# Patient Record
Sex: MALE | Race: WHITE | NOT HISPANIC OR LATINO | Employment: OTHER | URBAN - METROPOLITAN AREA
[De-identification: names, ages, dates, MRNs, and addresses within clinical notes are randomized per-mention and may not be internally consistent; named-entity substitution may affect disease eponyms.]

---

## 2019-06-12 ENCOUNTER — OFFICE VISIT (OUTPATIENT)
Dept: PULMONOLOGY | Facility: HOSPITAL | Age: 69
End: 2019-06-12
Payer: MEDICARE

## 2019-06-12 VITALS
WEIGHT: 205 LBS | HEART RATE: 97 BPM | HEIGHT: 77 IN | OXYGEN SATURATION: 98 % | DIASTOLIC BLOOD PRESSURE: 90 MMHG | SYSTOLIC BLOOD PRESSURE: 116 MMHG | TEMPERATURE: 96.2 F | BODY MASS INDEX: 24.21 KG/M2

## 2019-06-12 DIAGNOSIS — R06.00 DYSPNEA ON EXERTION: Primary | ICD-10-CM

## 2019-06-12 DIAGNOSIS — R93.89 ABNORMAL CT OF THE CHEST: ICD-10-CM

## 2019-06-12 PROBLEM — R06.09 DYSPNEA ON EXERTION: Status: ACTIVE | Noted: 2019-06-12

## 2019-06-12 PROCEDURE — 99205 OFFICE O/P NEW HI 60 MIN: CPT | Performed by: INTERNAL MEDICINE

## 2019-06-12 RX ORDER — ASPIRIN 81 MG/1
81 TABLET ORAL DAILY
COMMUNITY

## 2019-06-12 RX ORDER — PANTOPRAZOLE SODIUM 40 MG/1
40 TABLET, DELAYED RELEASE ORAL DAILY
COMMUNITY
End: 2020-07-14 | Stop reason: SDUPTHER

## 2019-06-12 RX ORDER — DOXEPIN HYDROCHLORIDE 25 MG/1
CAPSULE ORAL
COMMUNITY
End: 2020-05-06 | Stop reason: ALTCHOICE

## 2019-06-12 RX ORDER — PRAVASTATIN SODIUM 20 MG
20 TABLET ORAL DAILY
COMMUNITY
End: 2020-06-15 | Stop reason: SDUPTHER

## 2019-06-12 RX ORDER — SILDENAFIL 100 MG/1
100 TABLET, FILM COATED ORAL DAILY PRN
COMMUNITY
End: 2020-05-06 | Stop reason: ALTCHOICE

## 2019-06-12 RX ORDER — ZOLPIDEM TARTRATE 12.5 MG/1
12.5 TABLET, FILM COATED, EXTENDED RELEASE ORAL
COMMUNITY
End: 2020-11-25 | Stop reason: ALTCHOICE

## 2019-06-18 ENCOUNTER — TELEPHONE (OUTPATIENT)
Dept: PULMONOLOGY | Facility: CLINIC | Age: 69
End: 2019-06-18

## 2019-06-18 NOTE — TELEPHONE ENCOUNTER
Attempted to call patient's wife to discuss questions  No answer  Message left to return call at convenience

## 2019-06-18 NOTE — TELEPHONE ENCOUNTER
Patient's wife Cornel Jacome called requesting a call back regarding CT scan results as well has question and concern regarding a Bronch he is scheduled for on 06/28/19   Vadim Morgan can be reached at [phone]   Thank you

## 2019-06-28 ENCOUNTER — HOSPITAL ENCOUNTER (OUTPATIENT)
Dept: GASTROENTEROLOGY | Facility: HOSPITAL | Age: 69
Setting detail: OUTPATIENT SURGERY
Discharge: HOME | End: 2019-06-28
Attending: INTERNAL MEDICINE

## 2019-07-16 ENCOUNTER — EVALUATION (OUTPATIENT)
Dept: PHYSICAL THERAPY | Facility: CLINIC | Age: 69
End: 2019-07-16
Payer: MEDICARE

## 2019-07-16 ENCOUNTER — TRANSCRIBE ORDERS (OUTPATIENT)
Dept: PHYSICAL THERAPY | Facility: CLINIC | Age: 69
End: 2019-07-16

## 2019-07-16 DIAGNOSIS — R26.9 GAIT ABNORMALITY: Primary | ICD-10-CM

## 2019-07-16 PROCEDURE — 97162 PT EVAL MOD COMPLEX 30 MIN: CPT | Performed by: PHYSICAL THERAPIST

## 2019-07-16 NOTE — LETTER
2019    Corinne Mix, MD [place]  [facility]  [id number] [place] [address]    Patient: Parminder Cummings   YOB: 1950   Date of Visit: 2019     Encounter Diagnosis     ICD-10-CM    1  Gait abnormality R26 9        Dear Dr Bray Keys:    Please review the attached Plan of Care from [facility] recent visit  Please verify that you agree therapy should continue by signing the attached document and sending it back to our office  If you have any questions or concerns, please don't hesitate to call  Sincerely,    Charmayne Sins, PT      Referring Provider:      I certify that I have read the below Plan of Care and certify the need for these services furnished under this plan of treatment while under my care  Corinne Mix, MD [place]  [address]: [phone]          PT Evaluation     Today's date: 2019  Patient name: Parminder Cummings  : 1950  MRN: [id number]  Referring provider: Polo Walker MD  Dx:   Encounter Diagnosis     ICD-10-CM    1  Gait abnormality R26 9                   Assessment  Assessment details: Patient is a 77 y o  male presenting to outpatient physical therapy with chief complaints of difficulty walking  No further referral appears necessary at this time based upon examination results  Patient describes history of several falls in the past year and difficulty balancing with significant fall about a month ago resulting in cervical spine injury which was stabilized with surgery  Patient displays with abnormal gait, abnormal posture, balance impairments, functional strength deficits and primary movement diagnosis of balance restriction resulting in pathanatomical signs and symptoms consistent with gait abnoramlity and functional restrictions  Skilled PT is required to improve strength and balance to allow patient to return to desired level of function with ambulation          Please contact me if you have any questions  Thank you for the opportunity to share in the care of this patient  Impairments: abnormal coordination, abnormal gait, abnormal movement, impaired balance, impaired physical strength and lacks appropriate home exercise program    Symptom irritability: moderateUnderstanding of Dx/Px/POC: good   Prognosis: good    Goals  ST  Increase (B) UE/LE strength by 1/2 MMT grade in 3 weeks  2  Improve Romberg balance EC to 5 seconds in 3 weeks  3  Patient to ambulate with [facility] community distances in 3 weeks  4  I with HEP in 3 weeks  5  Improve FOTO score to 47 in 3 weeks  LT  Increase (B) UE/LE strength to 4-4+/5 all planes in 6 weeks  4  Improve Romberg balance EC to 15 seconds in 6 weeks  5  Patient to ambulate without  community distances in 6 weeks  6  I with HEP in 6 weeks  7  Improve FOTO score to 53 in 6 weeks  Plan  Plan details: Prognosis above is given PT services 2x/week over the next 6 weeks and home program adherence  Patient would benefit from: skilled physical therapy  Planned modality interventions: thermotherapy: hydrocollator packs and cryotherapy  Planned therapy interventions: activity modification, joint mobilization, manual therapy, motor coordination training, neuromuscular re-education, patient education, self care, therapeutic activities, therapeutic exercise, graded activity, home exercise program, behavior modification, gait training, transfer training and strengthening  Plan of Care beginning date: 2019  Plan of Care expiration date: 2019  Treatment plan discussed with: patient        Subjective Evaluation    History of Present Illness  Date of surgery: 2019  Mechanism of injury: surgery  Mechanism of injury: He reports over the past few years he has experienced difficulty balancing and having several falls in the last year - attributed to alcohol abuse  He notes alcohol abuse is to help him sleep    He has an appointment scheduled with a pulmonologist and sleep expert  He reports falling backwards about a month ago hitting his head  He was taken to the hospital - imaging was performed  Patient describes no fracture however instability was found  He reports undergoing surgery to stabilize upper cervical spine  He reports being transitioned to a skilled nursing facility - physician wrote script for outpatient PT  At his last visit with his surgeon he was given two additional weeks to wear his collar  Pain  Current pain ratin  At best pain ratin  At worst pain ratin    Social Support  Lives in: multiple-level home  Lives with: spouse    Patient Goals  Patient goal: Improve balance for walking around the house        Objective     Static Posture     Knee   Genu varus       Comments  Wide BRANNON    Neurological Testing     Sensation   Cervical/Thoracic   Left   Intact: light touch    Right   Intact: light touch    Reflexes   Left   Biceps (C5/C6): normal (2+)  Brachioradialis (C6): normal (2+)  Triceps (C7): normal (2+)  Akins's reflex: negative    Right   Biceps (C5/C6): normal (2+)  Brachioradialis (C6): normal (2+)  Triceps (C7): normal (2+)  Akins's reflex: negative    Strength/Myotome Testing   Cervical Spine     Left   Interossei strength (t1): 4+    Right   Interossei strength (t1): 4+    Left Shoulder     Planes of Motion   Flexion: 4+   Abduction: 4+   External rotation at 0°:  4+   Internal rotation at 0°:  4+     Right Shoulder     Planes of Motion   Flexion: 4+   Abduction: 4+   External rotation at 0°:  4+   Internal rotation at 0°:  4+     Left Elbow   Flexion: 5  Extension: 4-    Right Elbow   Flexion: 5  Extension: 4+    Left Wrist/Hand   Wrist extension: 4+  Wrist flexion: 4+  Thumb extension: 4+    Right Wrist/Hand   Wrist extension: 4+  Wrist flexion: 4+  Thumb extension: 4+    Left Hip   Planes of Motion   Flexion: 4+    Right Hip   Planes of Motion   Flexion: 4+    Left Knee   Flexion: 4+  Extension: 4+    Right Knee   Flexion: 4+  Extension: 4+    Functional Assessment        Comments  5x sit to stand: 22 sec  TU sec        Daily Treatment Diary     DX: Gait Abnormality  EPOC: 19  Precautions: fall risk, Out of cervical collar 19  CO-MORBIDITES: Cervical Fusion  PERSONAL FACTORS:    Manual                                                                 Exercise Diary  HEP         Recumbent Bike                    Gait Training with SPC                    HR/TR                    Romberg Balance with UE reaching          Sharpened Romberg          Biodex           Mini Squats          FWD Lunge          LAT Lunge                    Std Hip Flex          Std Hip ABD          Std Hip Ext                                        Floor to Standing Transfer Training                        Modalities

## 2019-07-16 NOTE — LETTER
2019    MD Jazzy Moreau  [address] [place] [address]    Patient: Erick Garduno   YOB: 1950   Date of Visit: 2019     Encounter Diagnosis     ICD-10-CM    1  Gait abnormality R26 9        Dear Dr Meliza Montana:    Please review the attached Plan of Care from [facility] recent visit  Please verify that you agree therapy should continue by signing the attached document and sending it back to our office  If you have any questions or concerns, please don't hesitate to call  Sincerely,    Anali Fenton, PT      Referring Provider:      I certify that I have read the below Plan of Care and certify the need for these services furnished under this plan of treatment while under my care  MD Jazzy Moreau  [address] [place] [address] St: [phone]          PT Evaluation     Today's date: 2019  Patient name: Erick Garduno  : 1950  MRN: [id number]  Referring provider: Piero Mukherjee MD  Dx: No diagnosis found  Assessment  Assessment details: Patient is a 77 y o   presenting to outpatient physical therapy with chief complaints of ____  ***REFERRAL STATEMENT*** Patient describes ____  Patient displays with ____ and primary movement diagnosis of ___ resulting in pathanatomical signs and symptoms consistent with ____ and functional restrictions  Skilled PT is required to ____ to allow patient to return to desired level of function with _____  Please contact me if you have any questions  Thank you for the opportunity to share in the care of this patient  Impairments: abnormal coordination, abnormal gait, abnormal movement, impaired balance, impaired physical strength and lacks appropriate home exercise program    Symptom irritability: moderateUnderstanding of Dx/Px/POC: good   Prognosis: good    Goals  ST  Increase (B) UE/LE strength by 1/2 MMT grade in 3 weeks    2  Improve Romberg balance EC to 5 seconds in 3 weeks  3  Patient to ambulate with [facility] community distances in 3 weeks  4  I with HEP in 3 weeks  5  Improve FOTO score to 47 in 3 weeks  LT  Increase (B) UE/LE strength to 4-4+/5 all planes in 6 weeks  4  Improve Romberg balance EC to 15 seconds in 6 weeks  5  Patient to ambulate without  community distances in 6 weeks  6  I with HEP in 6 weeks  7  Improve FOTO score to 53 in 6 weeks  Plan  Plan details: Prognosis above is given PT services 2x/week over the next 6 weeks and home program adherence  Patient would benefit from: skilled physical therapy  Planned modality interventions: thermotherapy: hydrocollator packs and cryotherapy  Planned therapy interventions: activity modification, joint mobilization, manual therapy, motor coordination training, neuromuscular re-education, patient education, self care, therapeutic activities, therapeutic exercise, graded activity, home exercise program, behavior modification, gait training, transfer training and strengthening  Plan of Care beginning date: 2019  Plan of Care expiration date: 2019  Treatment plan discussed with: patient        Subjective Evaluation    History of Present Illness  Date of surgery: 2019  Mechanism of injury: surgery  Mechanism of injury: He reports over the past few years he has experienced difficulty balancing  Patient history of several falls in the last year - attributed to alcohol abuse  He notes alcohol abuse is to help him sleep  He has an appointment scheduled with a pulmonologist and sleep expert  He reports falling backwards about a month ago hitting his head  He was taken to the hospital - imaging was performed  Patient describes no fracture however instability was found  He reports undergoing surgery to stabilize upper cervical spine  He reports being transitioned to a skilled nursing facility - physician wrote script for outpatient PT        At his last visit with his surgeon he was given two additional weeks to wear his collar          Pain  Current pain ratin  At best pain ratin  At worst pain ratin    Social Support  Lives in: multiple-level home  Lives with: spouse    Patient Goals  Patient goal: Improve balance for walking around the house        Objective     Neurological Testing     Sensation   Cervical/Thoracic   Left   Intact: light touch    Right   Intact: light touch    Reflexes   Left   Biceps (C5/C6): normal (2+)  Brachioradialis (C6): normal (2+)  Triceps (C7): normal (2+)  Akins's reflex: negative    Right   Biceps (C5/C6): normal (2+)  Brachioradialis (C6): normal (2+)  Triceps (C7): normal (2+)  Akins's reflex: negative    Strength/Myotome Testing   Cervical Spine     Left   Interossei strength (t1): 4+    Right   Interossei strength (t1): 4+    Left Shoulder     Planes of Motion   Flexion: 4+   Abduction: 4+   External rotation at 0°:  4+   Internal rotation at 0°:  4+     Right Shoulder     Planes of Motion   Flexion: 4+   Abduction: 4+   External rotation at 0°:  4+   Internal rotation at 0°:  4+     Left Elbow   Flexion: 5  Extension: 4-    Right Elbow   Flexion: 5  Extension: 4+    Left Wrist/Hand   Wrist extension: 4+  Wrist flexion: 4+  Thumb extension: 4+    Right Wrist/Hand   Wrist extension: 4+  Wrist flexion: 4+  Thumb extension: 4+    Left Hip   Planes of Motion   Flexion: 4+    Right Hip   Planes of Motion   Flexion: 4+    Left Knee   Flexion: 4+  Extension: 4+    Right Knee   Flexion: 4+  Extension: 4+    Functional Assessment        Comments  5x sit to stand: 22 sec  TU sec        Daily Treatment Diary     DX:  EPOC:  Precautions: ***  CO-MORBIDITES:  PERSONAL FACTORS:    Manual                                                                 Exercise Diary  HEP Modalities

## 2019-07-16 NOTE — PROGRESS NOTES
PT Evaluation     Today's date: 2019  Patient name: Angel Roa  : 1950  MRN: [id number]  Referring provider: Sirisha Stapleton MD  Dx:   Encounter Diagnosis     ICD-10-CM    1  Gait abnormality R26 9                   Assessment  Assessment details: Patient is a 77 y o  male presenting to outpatient physical therapy with chief complaints of difficulty walking  No further referral appears necessary at this time based upon examination results  Patient describes history of several falls in the past year and difficulty balancing with significant fall about a month ago resulting in cervical spine injury which was stabilized with surgery  Patient displays with abnormal gait, abnormal posture, balance impairments, functional strength deficits and primary movement diagnosis of balance restriction resulting in pathanatomical signs and symptoms consistent with gait abnoramlity and functional restrictions  Skilled PT is required to improve strength and balance to allow patient to return to desired level of function with ambulation  Please contact me if you have any questions  Thank you for the opportunity to share in the care of this patient  Impairments: abnormal coordination, abnormal gait, abnormal movement, impaired balance, impaired physical strength and lacks appropriate home exercise program    Symptom irritability: moderateUnderstanding of Dx/Px/POC: good   Prognosis: good    Goals  ST  Increase (B) UE/LE strength by 1/2 MMT grade in 3 weeks  2  Improve Romberg balance EC to 5 seconds in 3 weeks  3  Patient to ambulate with [facility] community distances in 3 weeks  4  I with HEP in 3 weeks  5  Improve FOTO score to 47 in 3 weeks  LT  Increase (B) UE/LE strength to 4-4+/5 all planes in 6 weeks  4  Improve Romberg balance EC to 15 seconds in 6 weeks  5  Patient to ambulate without  community distances in 6 weeks  6  I with HEP in 6 weeks    7  Improve FOTO score to 53 in 6 weeks       Plan  Plan details: Prognosis above is given PT services 2x/week over the next 6 weeks and home program adherence  Patient would benefit from: skilled physical therapy  Planned modality interventions: thermotherapy: hydrocollator packs and cryotherapy  Planned therapy interventions: activity modification, joint mobilization, manual therapy, motor coordination training, neuromuscular re-education, patient education, self care, therapeutic activities, therapeutic exercise, graded activity, home exercise program, behavior modification, gait training, transfer training and strengthening  Plan of Care beginning date: 2019  Plan of Care expiration date: 2019  Treatment plan discussed with: patient        Subjective Evaluation    History of Present Illness  Date of surgery: 2019  Mechanism of injury: surgery  Mechanism of injury: He reports over the past few years he has experienced difficulty balancing and having several falls in the last year - attributed to alcohol abuse  He notes alcohol abuse is to help him sleep  He has an appointment scheduled with a pulmonologist and sleep expert  He reports falling backwards about a month ago hitting his head  He was taken to the hospital - imaging was performed  Patient describes no fracture however instability was found  He reports undergoing surgery to stabilize upper cervical spine  He reports being transitioned to a skilled nursing facility - physician wrote script for outpatient PT  At his last visit with his surgeon he was given two additional weeks to wear his collar  Pain  Current pain ratin  At best pain ratin  At worst pain ratin    Social Support  Lives in: multiple-level home  Lives with: spouse    Patient Goals  Patient goal: Improve balance for walking around the house        Objective     Static Posture     Knee   Genu varus       Comments  Wide BRANNON    Neurological Testing     Sensation   Cervical/Thoracic Left   Intact: light touch    Right   Intact: light touch    Reflexes   Left   Biceps (C5/C6): normal (2+)  Brachioradialis (C6): normal (2+)  Triceps (C7): normal (2+)  Akins's reflex: negative    Right   Biceps (C5/C6): normal (2+)  Brachioradialis (C6): normal (2+)  Triceps (C7): normal (2+)  Akins's reflex: negative    Strength/Myotome Testing   Cervical Spine     Left   Interossei strength (t1): 4+    Right   Interossei strength (t1): 4+    Left Shoulder     Planes of Motion   Flexion: 4+   Abduction: 4+   External rotation at 0°: 4+   Internal rotation at 0°: 4+     Right Shoulder     Planes of Motion   Flexion: 4+   Abduction: 4+   External rotation at 0°: 4+   Internal rotation at 0°: 4+     Left Elbow   Flexion: 5  Extension: 4-    Right Elbow   Flexion: 5  Extension: 4+    Left Wrist/Hand   Wrist extension: 4+  Wrist flexion: 4+  Thumb extension: 4+    Right Wrist/Hand   Wrist extension: 4+  Wrist flexion: 4+  Thumb extension: 4+    Left Hip   Planes of Motion   Flexion: 4+    Right Hip   Planes of Motion   Flexion: 4+    Left Knee   Flexion: 4+  Extension: 4+    Right Knee   Flexion: 4+  Extension: 4+    Functional Assessment        Comments  5x sit to stand: 22 sec  TU sec        Daily Treatment Diary     DX: Gait Abnormality  EPOC: 19  Precautions: fall risk, Out of cervical collar 19  CO-MORBIDITES: Cervical Fusion  PERSONAL FACTORS:    Manual                                                                 Exercise Diary  HEP         Recumbent Bike                    Gait Training with SPC                    HR/TR                    Romberg Balance with UE reaching          Sharpened Romberg          Biodex           Mini Squats          FWD Lunge          LAT Lunge                    Std Hip Flex          Std Hip ABD          Std Hip Ext                                        Floor to Standing Transfer Training                        Modalities

## 2019-07-24 ENCOUNTER — OFFICE VISIT (OUTPATIENT)
Dept: PHYSICAL THERAPY | Facility: CLINIC | Age: 69
End: 2019-07-24
Payer: MEDICARE

## 2019-07-24 DIAGNOSIS — R26.9 GAIT ABNORMALITY: Primary | ICD-10-CM

## 2019-07-24 PROCEDURE — 97112 NEUROMUSCULAR REEDUCATION: CPT

## 2019-07-24 PROCEDURE — 97110 THERAPEUTIC EXERCISES: CPT

## 2019-07-24 NOTE — PROGRESS NOTES
Daily Note     Today's date: 2019  Patient name: Young Marshall  : 1950  MRN: [id number]  Referring provider: Catrachita Hameed MD  Dx:   Encounter Diagnosis     ICD-10-CM    1  Gait abnormality R26 9                   Subjective: Pt  Stated he has been doing well overall  He reports he has not been using his cane very much but furniture walks or goes without an assistive device if short distance  Objective: See treatment diary below      Assessment: Initiated PT POC today  Pt  Completed documented program  Tolerated treatment well  Pt  Was challenged by balance activities, especially with sharpened Romberg static standing positive sway but able to self correct  Pt  Fatigues quickly with TE's  Unable to remove UE's while working on the Biodex for weight shifting at this time  Gait trained with and without SPC, somewhat unsteady at times due to toe catch or shoe scuff on carpet  Aslo worked on pt  Sit to stand transfer from high-low table without the use of UE's today at 22" from floor to bottom of table x6 reps  Patient demonstrated fatigue post treatment, exhibited good technique with therapeutic exercises and would benefit from continued PT      Plan: Continue per plan of care  Progress treatment as tolerated           Daily Treatment Diary     DX: Gait Abnormality  EPOC: 19  Precautions: fall risk, Out of cervical collar 19  CO-MORBIDITES: Cervical Fusion  PERSONAL FACTORS:    Manual                                                                 Exercise Diary  HEP         Recumbent Bike  5'                  Gait Training with SPC  1 lap SPC  1 lap w/o                  HR/TR  10                  Romberg Balance with UE reaching  No reach  30"x2        Sharpened Romberg  20"x2 ea        Biodex   5' weight shift        Mini Squats  10        FWD Lunge          LAT Lunge  10                  Std Hip Flex  10        Std Hip ABD  10        Std Hip Ext  10                  Sit to stand on high low table  22"  x6                  Floor to Standing Transfer Training                        Modalities

## 2019-07-26 ENCOUNTER — APPOINTMENT (OUTPATIENT)
Dept: PHYSICAL THERAPY | Facility: CLINIC | Age: 69
End: 2019-07-26
Payer: MEDICARE

## 2019-07-30 ENCOUNTER — OFFICE VISIT (OUTPATIENT)
Dept: PHYSICAL THERAPY | Facility: CLINIC | Age: 69
End: 2019-07-30
Payer: MEDICARE

## 2019-07-30 DIAGNOSIS — R26.9 GAIT ABNORMALITY: Primary | ICD-10-CM

## 2019-07-30 PROCEDURE — 97110 THERAPEUTIC EXERCISES: CPT

## 2019-07-30 PROCEDURE — 97112 NEUROMUSCULAR REEDUCATION: CPT

## 2019-07-30 NOTE — PROGRESS NOTES
Daily Note     Today's date: 2019  Patient name: Chase Medina  : 1950  MRN: [id number]  Referring provider: Jose G Delgado MD  Dx:   Encounter Diagnosis     ICD-10-CM    1  Gait abnormality R26 9                   Subjective: Pt  Stated he has been doing well overall  He reports he has not been using his cane very much but furniture walks or goes without an assistive device if short distance  Objective: See treatment diary below      Assessment: Pt  Completed documented program  Tolerated treatment well  Pt  Continues to be challenged by balance activities, especially with sharpened Romberg static standing and neutral stance with EC- positive sway but able to self correct  Pt  Fatigues quickly with TE's  Needs continued work with eight shifting on Biodex  While ambulating pt  Tends to scuff floor at times which throws off his gait at times  Continued to worked on pt  Sit to stand transfer from high-low table without the use of UE's today at 21" from floor to bottom of table x10 reps  Patient demonstrated fatigue post treatment, exhibited good technique with therapeutic exercises and would benefit from continued PT      Plan: Continue per plan of care  Progress treatment as tolerated           Daily Treatment Diary     DX: Gait Abnormality  EPOC: 19  Precautions: fall risk, Out of cervical collar 19  CO-MORBIDITES: Cervical Fusion  PERSONAL FACTORS:    Manual                                                                 Exercise Diary  HEP        Recumbent Bike  5' 6'                 Gait Training with SPC  1 lap SPC  1 lap w/o 2 laps  No AD                 HR/TR  10 20                 Romberg Balance with UE reaching  No reach  30"x2 No reach  30"x2       Neutral BRANNON w  EC   15"x3       Sharpened Romberg  20"x2 ea 15"x3 ea       Biodex   5' weight shift 5'  Weight shift       Mini Squats  10 10       FWD Lunge          LAT Lunge  10 10                 Std Hip Flex  10 15 Std Hip ABD  10 15       Std Hip Ext  10 15                 Sit to stand on high low table  22"  x6 21"x  10       SLR   10 ea       TB H/L hip ABD   BTB  5"x15       Bridges   5"x10                 Floor to Standing Transfer Training                        Modalities                                                  Daily Note     Today's date: 2019  Patient name: Gabino Sheets  : 1950  MRN: [id number]  Referring provider: Balta Sanchez MD  Dx:   Encounter Diagnosis     ICD-10-CM    1  Gait abnormality R26 9                   Subjective: Pt  Stated he has been doing well overall  He reports he has not been using his cane very much but furniture walks or goes without an assistive device if short distance  Objective: See treatment diary below      Assessment: Initiated PT POC today  Pt  Completed documented program  Tolerated treatment well  Pt  Was challenged by balance activities, especially with sharpened Romberg static standing positive sway but able to self correct  Pt  Fatigues quickly with TE's  Unable to remove UE's while working on the [email or web address] for weight shifting at this time  Gait trained with and without SPC, somewhat unsteady at times due to toe catch or shoe scuff on carpet  Aslo worked on pt  Sit to stand transfer from high-low table without the use of UE's today at 22" from floor to bottom of table x6 reps  Patient demonstrated fatigue post treatment, exhibited good technique with therapeutic exercises and would benefit from continued PT      Plan: Continue per plan of care  Progress treatment as tolerated           Daily Treatment Diary     DX: Gait Abnormality  EPOC: 19  Precautions: fall risk, Out of cervical collar 19  CO-MORBIDITES: Cervical Fusion  PERSONAL FACTORS:    Manual                                                                 Exercise Diary  HEP        Recumbent Bike  5' 6'                 Gait Training with SPC  1 lap SPC  1 lap w/o HR/TR  10 15                 Romberg Balance with UE reaching  No reach  30"x2        Sharpened Romberg  20"x2 ea        Biodex   5' weight shift        Mini Squats  10 10       FWD Lunge          LAT Lunge  10 10                 Std Hip Flex  10 15       Std Hip ABD  10 15       Std Hip Ext  10 15                 Sit to stand on high low table  22"  x6                  Floor to Standing Transfer Training                        Modalities

## 2019-08-02 ENCOUNTER — OFFICE VISIT (OUTPATIENT)
Dept: PHYSICAL THERAPY | Facility: CLINIC | Age: 69
End: 2019-08-02
Payer: MEDICARE

## 2019-08-02 DIAGNOSIS — R26.9 GAIT ABNORMALITY: Primary | ICD-10-CM

## 2019-08-02 PROCEDURE — 97110 THERAPEUTIC EXERCISES: CPT

## 2019-08-02 PROCEDURE — 97112 NEUROMUSCULAR REEDUCATION: CPT

## 2019-08-02 NOTE — PROGRESS NOTES
Daily Note     Today's date: 2019  Patient name: Lindsay Bain  : 1950  MRN: [id number]  Referring provider: Molly Herbert MD  Dx:   Encounter Diagnosis     ICD-10-CM    1  Gait abnormality R26 9                   Subjective: Pt  Stated he has been doing well overall  He reports he has not been using his cane very much but furniture walks or goes without an assistive device if short distance  Objective: See treatment diary below      Assessment: Pt  Completed documented program  Tolerated treatment well  Pt  Continues to be challenged by balance activities, especially with sharpened Romberg static standing and neutral stance with EC- positive sway but able to self correct  Has improved some since starting therapy  Pt  Fatigues quickly with TE's  Needs continued work with weight shifting on Biode[employer]  While ambulating pt  Tends to scuff floor at times which throws off his gait at times  Continued to worked on pt  Sit to stand transfer from high-low table without the use of UE's today at 20" from floor to bottom of table x10 reps  Patient demonstrated fatigue post treatment, exhibited good technique with therapeutic exercises and would benefit from continued PT      Plan: Continue per plan of care  Progress treatment as tolerated           Daily Treatment Diary     DX: Gait Abnormality  EPOC: 19  Precautions: fall risk, Out of cervical collar 19  CO-MORBIDITES: Cervical Fusion  PERSONAL FACTORS:    Manual                                                                 Exercise Diary  HEP  8/2      Recumbent Bike  5' 6' 10'                Gait Training with SPC  1 lap SPC  1 lap w/o 2 laps  No AD 2 laps  No AD                HR/TR  10 20 20                Romberg Balance with UE reaching  No reach  30"x2 No reach  30"x2 No reach  30"x2      Neutral BRANNON w  EC   15"x3 20"x3      Sharpened Romberg  20"x2 ea 15"x3 ea 20"x3      Biodex   5' weight shift 5'  Weight shift 5'  Weight shift      Mini Squats  10 10 10      FWD Lunge          LAT Lunge  10 10 10                Std Hip Flex  10 15 15      Std Hip ABD  10 15 15      Std Hip Ext  10 15 15                Sit to stand on high low table  22"  x6 21"x  10 20" x  10      SLR   10 ea 10 ea      TB H/L hip ABD   BTB  5"x15 BTB  5"x15      Bridges   5"x10 5"x10                Floor to Standing Transfer Training                        Modalities

## 2019-08-06 ENCOUNTER — OFFICE VISIT (OUTPATIENT)
Dept: PHYSICAL THERAPY | Facility: CLINIC | Age: 69
End: 2019-08-06
Payer: MEDICARE

## 2019-08-06 DIAGNOSIS — R26.9 GAIT ABNORMALITY: Primary | ICD-10-CM

## 2019-08-06 PROCEDURE — 97112 NEUROMUSCULAR REEDUCATION: CPT

## 2019-08-06 PROCEDURE — 97110 THERAPEUTIC EXERCISES: CPT

## 2019-08-06 NOTE — PROGRESS NOTES
Daily Note     Today's date: 2019  Patient name: Jos eMartin Maloney  : 1950  MRN: [id number]  Referring provider: Bel Watson MD  Dx:   Encounter Diagnosis     ICD-10-CM    1  Gait abnormality R26 9                   Subjective: Pt  Stated he has been doing well overall  He stated he feels a little nauseous today and may need to avoid some of the balance activities  Objective: See treatment diary below      Assessment: Pt  Completed documented program  Tolerated treatment well  Pt  Continues to be challenged by balance activities, especially with sharpened Romberg static standing and neutral stance with EC- positive sway but able to self correct  Has improved some since starting therapy  Pt  Fatigues quickly with TE's  Needs continued work with weight shifting on Biodex  While ambulating pt  Tends to scuff floor at times which throws off his gait at times  Continued to worked on pt  Sit to stand transfer from high-low table without the use of UE's today at 19" from floor to bottom of table x10 reps  Patient demonstrated fatigue post treatment, exhibited good technique with therapeutic exercises and would benefit from continued PT  * Did not progress balance activities due to pt 's nausea but is ready to progress NV  Plan: Continue per plan of care  Progress treatment as tolerated           Daily Treatment Diary     DX: Gait Abnormality  EPOC: 19  Precautions: fall risk, Out of cervical collar 19  CO-MORBIDITES: Cervical Fusion  PERSONAL FACTORS:    Manual                                                                 Exercise Diary  HEP  8/ 8/6     Recumbent Bike  5' 6' 10' 10'               Gait Training with SPC  1 lap SPC  1 lap w/o 2 laps  No AD 2 laps  No AD                HR/TR  10 20 20 20               Romberg Balance with UE reaching  No reach  30"x2 No reach  30"x2 No reach  30"x2 No reach  30"x2     Neutral BRANNON w  EC   15"x3 20"x3 20"x3     Sharpened Romberg  20"x2 ea 15"x3 ea 20"x3 20"x3 ea     Biodex   5' weight shift 5'  Weight shift 5'  Weight shift 5'  Weight shift     Mini Squats  10 10 10 10     FWD Lunge          LAT Lunge  10 10 10 10 a               Std Hip Flex  10 15 15 2 5#  x10     Std Hip ABD  10 15 15 2 5#  x10 ea     Std Hip Ext  10 15 15 2 5#  x10               Sit to stand on high low table  22"  x6 21"x  10 20" x  10 19"x  10     SLR   10 ea 10 ea 15 ea     TB H/L hip ABD   BTB  5"x15 BTB  5"x15 BTB  10x2     Bridges   5"x10 5"x10 5"x10               Floor to Standing Transfer Training                        Modalities

## 2019-08-09 ENCOUNTER — OFFICE VISIT (OUTPATIENT)
Dept: PHYSICAL THERAPY | Facility: CLINIC | Age: 69
End: 2019-08-09
Payer: MEDICARE

## 2019-08-09 DIAGNOSIS — R26.9 GAIT ABNORMALITY: Primary | ICD-10-CM

## 2019-08-09 PROCEDURE — 97112 NEUROMUSCULAR REEDUCATION: CPT

## 2019-08-09 PROCEDURE — 97110 THERAPEUTIC EXERCISES: CPT

## 2019-08-09 NOTE — PROGRESS NOTES
Daily Note     Today's date: 2019  Patient name: Ann Arrington  : 1950  MRN: [id number]  Referring provider: Moriah Buenrostro MD  Dx:   Encounter Diagnosis     ICD-10-CM    1  Gait abnormality R26 9                   Subjective: Pt  Stated he has been doing well and feeling more stable and balanced around the house  Objective: See treatment diary below      Assessment: Pt  Completed documented program  Pt  Continues to improve with balance activities  Still has difficulty with eyes closed and on uneven surfaces  Has continued to gain strength and improved his sit to stand from lower height  Pt  Would like to work on standing from the ground without assistance from anything  He does not wish to use his cane any longer  Plan: Continue per plan of care  Progress treatment as tolerated           Daily Treatment Diary     DX: Gait Abnormality  EPOC: 19  Precautions: fall risk, Out of cervical collar 19  CO-MORBIDITES: Cervical Fusion  PERSONAL FACTORS:    Manual                                                                 Exercise Diary  HEP       Recumbent Bike  5' 6' 10' 10' 8'              Gait Training with SPC  1 lap SPC  1 lap w/o 2 laps  No AD 2 laps  No AD                HR/TR  10 20 20 20 20              Romberg Balance with UE reaching  No reach  30"x2 No reach  30"x2 No reach  30"x2 No reach  30"x2 On foam  30"x2    Neutral BRANNON w  EC   15"x3 20"x3 20"x3 20"x3    Sharpened Romberg  20"x2 ea 15"x3 ea 20"x3 20"x3 ea 20"x2 ea    Tandem amb //bars      Foam  3 laps    Biodex   5' weight shift 5'  Weight shift 5'  Weight shift 5'  Weight shift     Mini Squats  10 10 10 10 10    FWD Lunge          LAT Lunge  10 10 10 10 a 2 5#  10 ea              Std Hip Flex  10 15 15 2 5#  x10 2 5#  x20    Std Hip ABD  10 15 15 2 5#  x10 ea 2 5#  x20    Std Hip Ext  10 15 15 2 5#  x10 2 5#  x20              Sit to stand on high low table  22"  x6 21"x  10 20" x  10 19"x  10 18"x  10    SLR   10 ea 10 ea 15 ea 2# x20     TB H/L hip ABD   BTB  5"x15 BTB  5"x15 BTB  10x2 BTB  10x2    Bridges   5"x10 5"x10 5"x10 5"x15              Floor to Standing Transfer Training      nv?                   Modalities

## 2019-08-13 ENCOUNTER — OFFICE VISIT (OUTPATIENT)
Dept: PHYSICAL THERAPY | Facility: CLINIC | Age: 69
End: 2019-08-13
Payer: MEDICARE

## 2019-08-13 DIAGNOSIS — R26.9 GAIT ABNORMALITY: Primary | ICD-10-CM

## 2019-08-13 PROCEDURE — 97110 THERAPEUTIC EXERCISES: CPT | Performed by: PHYSICAL THERAPIST

## 2019-08-13 PROCEDURE — 97530 THERAPEUTIC ACTIVITIES: CPT | Performed by: PHYSICAL THERAPIST

## 2019-08-13 PROCEDURE — 97112 NEUROMUSCULAR REEDUCATION: CPT | Performed by: PHYSICAL THERAPIST

## 2019-08-13 NOTE — PROGRESS NOTES
Daily Note     Today's date: 2019  Patient name: Chen Haque  : 1950  MRN: [id number]  Referring provider: Katlin Andrade MD  Dx:   Encounter Diagnosis     ICD-10-CM    1  Gait abnormality R26 9                   Subjective: Patient reports no adverse reaction to his LV  He reports overall he sees improvement with balance and continues to be challenged with PT treatments  Objective: See treatment diary below      Assessment: Attempted floor to stand transfer - able to transition from tall kneeling to half kneeling but unable to progress to standing from half kneeling  Discussed options and importance of not falling - patient agreed to use [facility] when outside in an effort to avoid falling  Balance exercises were progressed with reaching and biodex  Patient was frustrated with lack of progress - explained importance to push the limits of balance and how his walking balance has improved  Plan: Continue per plan of care  Progress balancing squat/ FWD lunge strength as able to achieve floor to stand transfer  Monitor compliance with use of SPC when walking in the grass           Daily Treatment Diary     DX: Gait Abnormality  EPOC: 19  Precautions: fall risk, Out of cervical collar 19  CO-MORBIDITES: Cervical Fusion  PERSONAL FACTORS:    Manual                                                                 Exercise Diary  HEP  8     Recumbent Bike  10' 10' 8' 8'               Gait Training with SPC  2 laps  No AD                  HR/TR  20 20 20                Romberg Balance with UE reaching  No reach  30"x2 No reach  30"x2 On foam  30"x2 Foam  6 Way  10x ea     Neutral BRANNON w  EC  20"x3 20"x3 20"x3 20"x3      Sharpened Romberg  20"x3 20"x3 ea 20"x2 ea 20"x2 ea     Tandem amb //bars    Foam  3 laps Foam3 laps     Biodex   5'  Weight shift 5'  Weight shift  5' FWD/ LAT Wt Shift     Mini Squats  10 10 10 10     FWD Lunge     10x ea     LAT Lunge  10 10 a 2 5#  10 ea 10x ea               Std Hip Flex  15 2 5#  x10 2 5#  x20      Std Hip ABD  15 2 5#  x10 ea 2 5#  x20      Std Hip Ext  15 2 5#  x10 2 5#  x20                Sit to stand on high low table  20" x  10 19"x  10 18"x  10 18"x  10     SLR  10 ea 15 ea 2# x20       TB H/L hip ABD  BTB  5"x15 BTB  10x2 BTB  10x2      Bridges  5"x10 5"x10 5"x15                Floor to Standing Transfer Training    nv? 8 min                   Modalities

## 2019-08-16 ENCOUNTER — OFFICE VISIT (OUTPATIENT)
Dept: PHYSICAL THERAPY | Facility: CLINIC | Age: 69
End: 2019-08-16
Payer: MEDICARE

## 2019-08-16 DIAGNOSIS — R26.9 GAIT ABNORMALITY: Primary | ICD-10-CM

## 2019-08-16 PROCEDURE — 97110 THERAPEUTIC EXERCISES: CPT

## 2019-08-16 PROCEDURE — 97112 NEUROMUSCULAR REEDUCATION: CPT

## 2019-08-16 PROCEDURE — 97530 THERAPEUTIC ACTIVITIES: CPT

## 2019-08-16 NOTE — PROGRESS NOTES
Daily Note     Today's date: 2019  Patient name: Chen Haque  : 1950  MRN: [id number]  Referring provider: Katlin Andrade MD  Dx:   Encounter Diagnosis     ICD-10-CM    1  Gait abnormality R26 9                   Subjective: Patient reports no adverse reaction to his LV  He reports overall he sees improvement with balance and continues to be challenged with PT treatments  Objective: See treatment diary below      Assessment: Continued to challenge pt 's balance while standing on uneven surfaces and reaching  Pt  Continues to be challenged when reaching outside of his BRANNON  Positive sway and unable to self correct with modified tandem stance-needed therapist assistance to regain balance  He continues to build strength in LE's and will revisit standing from floor as he progresses  Plan: Continue per plan of care  Progress balancing squat/ FWD lunge strength as able to achieve floor to stand transfer  Monitor compliance with use of SPC when walking in the grass           Daily Treatment Diary     DX: Gait Abnormality  EPOC: 19  Precautions: fall risk, Out of cervical collar 19  CO-MORBIDITES: Cervical Fusion  PERSONAL FACTORS:    Manual                                                                 Exercise Diary  HEP     Recumbent Bike  10' 10' 8' 8' 10'              Gait Training with SPC  2 laps  No AD                  HR/TR  20 20 20  20              Romberg Balance with UE reaching  No reach  30"x2 No reach  30"x2 On foam  30"x2 Foam  6 Way  10x ea Foam  6 Way  10x ea    Neutral BRANNON w  EC  20"x3 20"x3 20"x3 20"x3  30"x3    Sharpened Romberg  20"x3 20"x3 ea 20"x2 ea 20"x2 ea 30"x2 ea    Tandem amb //bars    Foam  3 laps Foam3 laps Foam3 laps    Biodex   5'  Weight shift 5'  Weight shift  5' FWD/ LAT Wt Shift 8' FWD/ LAT Wt Shift    Mini Squats  10 10 10 10 10    FWD Lunge     10x ea 10x2    LAT Lunge  10 10 a 2 5#  10 ea 10x ea 10              Std Hip Flex  15 2 5#  x10 2 5#  x20      Std Hip ABD  15 2 5#  x10 ea 2 5#  x20      Std Hip Ext  15 2 5#  x10 2 5#  x20      Leg Press      55#  x12              Sit to stand on high low table  20" x  10 19"x  10 18"x  10 18"x  10 17"x  10    SLR  10 ea 15 ea 2# x20   3#  10 ea    TB H/L hip ABD  BTB  5"x15 BTB  10x2 BTB  10x2  BTB  10x2    Bridges  5"x10 5"x10 5"x15  5"x15              Floor to Standing Transfer Training    nv? 8 min                   Modalities Island Pedicle Flap Text: The defect edges were debeveled with a #15 scalpel blade.  Given the location of the defect, shape of the defect and the proximity to free margins an island pedicle advancement flap was deemed most appropriate.  Using a sterile surgical marker, an appropriate advancement flap was drawn incorporating the defect, outlining the appropriate donor tissue and placing the expected incisions within the relaxed skin tension lines where possible.    The area thus outlined was incised deep to adipose tissue with a #15 scalpel blade.  The skin margins were undermined to an appropriate distance in all directions around the primary defect and laterally outward around the island pedicle utilizing iris scissors.  There was minimal undermining beneath the pedicle flap.

## 2019-08-20 ENCOUNTER — APPOINTMENT (OUTPATIENT)
Dept: PHYSICAL THERAPY | Facility: CLINIC | Age: 69
End: 2019-08-20
Payer: MEDICARE

## 2019-08-22 ENCOUNTER — OFFICE VISIT (OUTPATIENT)
Dept: PHYSICAL THERAPY | Facility: CLINIC | Age: 69
End: 2019-08-22
Payer: MEDICARE

## 2019-08-22 DIAGNOSIS — R26.9 GAIT ABNORMALITY: Primary | ICD-10-CM

## 2019-08-22 PROCEDURE — 97110 THERAPEUTIC EXERCISES: CPT

## 2019-08-22 PROCEDURE — 97530 THERAPEUTIC ACTIVITIES: CPT

## 2019-08-22 PROCEDURE — 97112 NEUROMUSCULAR REEDUCATION: CPT

## 2019-08-22 NOTE — PROGRESS NOTES
Daily Note     Today's date: 2019  Patient name: Gabino Sheets  : 1950  MRN: [id number]  Referring provider: Balta Sanchez MD  Dx:   Encounter Diagnosis     ICD-10-CM    1  Gait abnormality R26 9                   Subjective: Patient reports no adverse reaction to his LV  He reports overall he sees improvement with balance and continues to be challenged with PT treatments  Objective: See treatment diary below      Assessment: Continued to challenge pt 's balance while standing on uneven surfaces and reaching  Pt  Continues to be challenged when reaching outside of his BRANNON  Positive sway and but able to self correct with modified tandem stance  Eyes closed also very challenging for pt  But has improved  Continues to be able to progress sit/stand xfer from lower heights  He continues to build strength in LE's and will revisit standing from floor as he progresses  Plan: Continue per plan of care  Progress balancing squat/ FWD lunge strength as able to achieve floor to stand transfer  Monitor compliance with use of SPC when walking in the grass           Daily Treatment Diary     DX: Gait Abnormality  EPOC: 19  Precautions: fall risk, Out of cervical collar 19  CO-MORBIDITES: Cervical Fusion  PERSONAL FACTORS:    Manual                                                                 Exercise Diary  HEP    Recumbent Bike  10' 10' 8' 8' 10' 8'             Gait Training with SPC  2 laps  No AD                  HR/TR  20 20 20  20 20             Romberg Balance with UE reaching  No reach  30"x2 No reach  30"x2 On foam  30"x2 Foam  6 Way  10x ea Foam  6 Way  10x ea Foam  6 Way  10x ea   Neutral BRANNON w  EC  20"x3 20"x3 20"x3 20"x3  30"x3 30"x3   Sharpened Romberg  20"x3 20"x3 ea 20"x2 ea 20"x2 ea 30"x2 ea 30"x2 ea   Tandem amb //bars    Foam  3 laps Foam3 laps Foam3 laps No foam  3 laps   Biodex   5'  Weight shift 5'  Weight shift  5' FWD/ LAT Wt Shift 8' FWD/ LAT Wt Shift 8' weight shift /  stability     Mini Squats  10 10 10 10 10 5"x10   FWD Lunge     10x ea 10x2 10 ea   LAT Lunge  10 10 a 2 5#  10 ea 10x ea 10 10 ea             Std Hip Flex  15 2 5#  x10 2 5#  x20      Std Hip ABD  15 2 5#  x10 ea 2 5#  x20      Std Hip Ext  15 2 5#  x10 2 5#  x20      Leg Press      55#  x12 55#  10x2             Sit to stand on high low table  20" x  10 19"x  10 18"x  10 18"x  10 17"x  10 17"x  10   SLR  10 ea 15 ea 2# x20   3#  10 ea 3#  10x2 ea   TB H/L hip ABD  BTB  5"x15 BTB  10x2 BTB  10x2  BTB  10x2 BTB  10x2   Bridges  5"x10 5"x10 5"x15  5"x15 5"x15  W   VT             Floor to Standing Transfer Training    nv? 8 min  Revisit  In future                   Modalities

## 2019-08-27 ENCOUNTER — EVALUATION (OUTPATIENT)
Dept: PHYSICAL THERAPY | Facility: CLINIC | Age: 69
End: 2019-08-27
Payer: MEDICARE

## 2019-08-27 ENCOUNTER — TRANSCRIBE ORDERS (OUTPATIENT)
Dept: PHYSICAL THERAPY | Facility: CLINIC | Age: 69
End: 2019-08-27

## 2019-08-27 DIAGNOSIS — R26.9 GAIT ABNORMALITY: Primary | ICD-10-CM

## 2019-08-27 PROCEDURE — 97110 THERAPEUTIC EXERCISES: CPT | Performed by: PHYSICAL THERAPIST

## 2019-08-27 NOTE — LETTER
2019    MD Jazzy Hirsch  [address] [address]    Patient: Megan Pack   YOB: 1950   Date of Visit: 2019     Encounter Diagnosis     ICD-10-CM    1  Gait abnormality R26 9        Dear Dr Swati Matamoros: Thank you for your recent referral of Megan Pack  Please review the attached evaluation summary from Obinna's recent visit  Please verify that you agree with the plan of care by signing the attached order  If you have any questions or concerns, please do not hesitate to call  I sincerely appreciate the opportunity to share in the care of one of your patients and hope to have another opportunity to work with you in the near future  Sincerely,    López Beaver, PT      Referring Provider:      I certify that I have read the below Plan of Care and certify the need for these services furnished under this plan of treatment while under my care  Jennifer Swift MD  tamika  [address] [place] [address] St: [phone]          PT Re-Evaluation     Today's date: 2019  Patient name: Megan Pack  : 1950  MRN: [id number]  Referring provider: Sandrine Camacho MD  Dx:   Encounter Diagnosis     ICD-10-CM    1  Gait abnormality R26 9                   Assessment  Assessment details: Since starting PT patient displays improvements with balance, strength, and function  Patient has made good progress with PT  Continued PT treatments focused on improving strength and balance are required to return patient to desired functional level with ambulation and transfers  Patient will be following up with a sleep expert early next month  Please contact me if you have any questions  Thank you for the opportunity to share in the care of this patient      Impairments: abnormal coordination, abnormal gait, abnormal movement, impaired balance, impaired physical strength and lacks appropriate home exercise program    Symptom irritability: moderateUnderstanding of Dx/Px/POC: good   Prognosis: good    Goals  ST  Increase (B) UE/LE strength by 1/2 MMT grade in 3 weeks  - met  2  Improve Romberg balance EC to 5 seconds in 3 weeks  - met  3  Patient to ambulate with [facility] community distances in 3 weeks  - met  4  I with HEP in 3 weeks  - met  5  Improve FOTO score to 47 in 3 weeks  - met  LT  Increase (B) UE/LE strength to 4-4+/5 all planes in 6 weeks  4  Improve Romberg balance EC to 15 seconds in 6 weeks  5  Patient to ambulate without AD community distances in 6 weeks  - met  6  I with HEP in 6 weeks  - met  7  Improve FOTO score to 53 in 6 weeks  - met      Plan  Plan details: Prognosis above is given PT services 2x/week over the next 4 weeks and home program adherence  Patient would benefit from: skilled physical therapy  Planned modality interventions: thermotherapy: hydrocollator packs and cryotherapy  Planned therapy interventions: activity modification, joint mobilization, manual therapy, motor coordination training, neuromuscular re-education, patient education, self care, therapeutic activities, therapeutic exercise, graded activity, home exercise program, behavior modification, gait training, transfer training and strengthening  Plan of Care beginning date: 2019  Plan of Care expiration date: 2019  Treatment plan discussed with: patient        Subjective Evaluation    History of Present Illness  Date of surgery: 2019  Mechanism of injury: surgery  Mechanism of injury: Patient reports since starting PT treatments he sees improvements with balance, strength, and function  He reports no neck pain since taking off collar  He notes he is no longer using an AD  He reports increased difficulty with stairs  He continues to have difficulty sleeping and difficulty with floor to stand transfer      Pain  Current pain ratin  At best pain ratin  At worst pain ratin    Social Support  Lives in: multiple-level home  Lives with: spouse    Patient Goals  Patient goal: Improve balance for walking around the house        Objective     Static Posture     Knee   Genu varus       Comments  Wide BRANNON    Neurological Testing     Sensation   Cervical/Thoracic   Left   Intact: light touch    Right   Intact: light touch    Reflexes   Left   Biceps (C5/C6): normal (2+)  Brachioradialis (C6): normal (2+)  Triceps (C7): normal (2+)  Akins's reflex: negative    Right   Biceps (C5/C6): normal (2+)  Brachioradialis (C6): normal (2+)  Triceps (C7): normal (2+)  Akins's reflex: negative    Strength/Myotome Testing   Cervical Spine     Left   Interossei strength (t1): 4+    Right   Interossei strength (t1): 4+    Left Shoulder     Planes of Motion   Flexion: 4+   Abduction: 4+   External rotation at 0°:  4+   Internal rotation at 0°:  4+     Right Shoulder     Planes of Motion   Flexion: 4+   Abduction: 4+   External rotation at 0°:  4+   Internal rotation at 0°:  4+     Left Elbow   Flexion: 5  Extension: 4-    Right Elbow   Flexion: 5  Extension: 4+    Left Wrist/Hand   Wrist extension: 4+  Wrist flexion: 4+  Thumb extension: 4+    Right Wrist/Hand   Wrist extension: 4+  Wrist flexion: 4+  Thumb extension: 4+    Left Hip   Planes of Motion   Flexion: 4+  Extension: 3+  Abduction: 4-    Right Hip   Planes of Motion   Flexion: 4+  Extension: 3+  Abduction: 4-    Left Knee   Flexion: 4+  Extension: 5    Right Knee   Flexion: 4+  Extension: 4+    Functional Assessment        Comments  5x sit to stand: 19 7 sec  TUG: 15 sec         Daily Treatment Diary     DX: Gait Abnormality  EPOC: 8/27/19  Precautions: fall risk, Out of cervical collar 7/30/19  CO-MORBIDITES: Cervical Fusion  PERSONAL FACTORS:    Manual                                                                 Exercise Diary  HEP 8/13 8/16 8/22 8/27      Recumbent Bike  8' 10' 8' 15'                 Gait Training with [facility]                      HR/TR 20 20                  Romberg Balance with UE reaching  Foam  6 Way  10x ea Foam  6 Way  10x ea Foam  6 Way  10x ea       Neutral BRANNON w  EC  20"x3  30"x3 30"x3       Sharpened Romberg  20"x2 ea 30"x2 ea 30"x2 ea       Tandem amb //bars  Foam3 laps Foam3 laps No foam  3 laps       Biodex   5' FWD/ LAT Wt Shift 8' FWD/ LAT Wt Shift 8' weight shift /  stability         Mini Squats  10 10 5"x10       FWD Lunge  10x ea 10x2 10 ea       LAT Lunge  10x ea 10 10 ea                  Std Hip Flex           Std Hip ABD           Std Hip Ext           Leg Press   55#  x12 55#  10x2                  Sit to stand on high low table  18"x  10 17"x  10 17"x  10       SLR   3#  10 ea 3#  10x2 ea       TB H/L hip ABD   BTB  10x2 BTB  10x2       Bridges   5"x15 5"x15  W   CA                  Floor to Standing Transfer Training  8 min  Revisit  In future                        Modalities

## 2019-08-27 NOTE — PROGRESS NOTES
PT Re-Evaluation     Today's date: 2019  Patient name: Dona Ramirez  : 1950  MRN: [id number]  Referring provider: Urbano Frost MD  Dx:   Encounter Diagnosis     ICD-10-CM    1  Gait abnormality R26 9                   Assessment  Assessment details: Since starting PT patient displays improvements with balance, strength, and function  Patient has made good progress with PT  Continued PT treatments focused on improving strength and balance are required to return patient to desired functional level with ambulation and transfers  Patient will be following up with a sleep expert early next month  Please contact me if you have any questions  Thank you for the opportunity to share in the care of this patient  Impairments: abnormal coordination, abnormal gait, abnormal movement, impaired balance, impaired physical strength and lacks appropriate home exercise program    Symptom irritability: moderateUnderstanding of Dx/Px/POC: good   Prognosis: good    Goals  ST  Increase (B) UE/LE strength by 1/2 MMT grade in 3 weeks  - met  2  Improve Romberg balance EC to 5 seconds in 3 weeks  - met  3  Patient to ambulate with [facility] distances in 3 weeks  - met  4  I with HEP in 3 weeks  - met  5  Improve FOTO score to 47 in 3 weeks  - met  LT  Increase (B) UE/LE strength to 4-4+/5 all planes in 6 weeks  4  Improve Romberg balance EC to 15 seconds in 6 weeks  5  Patient to ambulate without  community distances in 6 weeks  - met  6  I with HEP in 6 weeks  - met  7  Improve FOTO score to 53 in 6 weeks  - met      Plan  Plan details: Prognosis above is given PT services 2x/week over the next 4 weeks and home program adherence    Patient would benefit from: skilled physical therapy  Planned modality interventions: thermotherapy: hydrocollator packs and cryotherapy  Planned therapy interventions: activity modification, joint mobilization, manual therapy, motor coordination training, neuromuscular re-education, patient education, self care, therapeutic activities, therapeutic exercise, graded activity, home exercise program, behavior modification, gait training, transfer training and strengthening  Plan of Care beginning date: 2019  Plan of Care expiration date: 2019  Treatment plan discussed with: patient        Subjective Evaluation    History of Present Illness  Date of surgery: 2019  Mechanism of injury: surgery  Mechanism of injury: Patient reports since starting PT treatments he sees improvements with balance, strength, and function  He reports no neck pain since taking off collar  He notes he is no longer using an AD  He reports increased difficulty with stairs  He continues to have difficulty sleeping and difficulty with floor to stand transfer  Pain  Current pain ratin  At best pain ratin  At worst pain ratin    Social Support  Lives in: multiple-level home  Lives with: spouse    Patient Goals  Patient goal: Improve balance for walking around the house        Objective     Static Posture     Knee   Genu varus       Comments  Wide BRANNON    Neurological Testing     Sensation   Cervical/Thoracic   Left   Intact: light touch    Right   Intact: light touch    Reflexes   Left   Biceps (C5/C6): normal (2+)  Brachioradialis (C6): normal (2+)  Triceps (C7): normal (2+)  Akins's reflex: negative    Right   Biceps (C5/C6): normal (2+)  Brachioradialis (C6): normal (2+)  Triceps (C7): normal (2+)  Akins's reflex: negative    Strength/Myotome Testing   Cervical Spine     Left   Interossei strength (t1): 4+    Right   Interossei strength (t1): 4+    Left Shoulder     Planes of Motion   Flexion: 4+   Abduction: 4+   External rotation at 0°: 4+   Internal rotation at 0°: 4+     Right Shoulder     Planes of Motion   Flexion: 4+   Abduction: 4+   External rotation at 0°: 4+   Internal rotation at 0°: 4+     Left Elbow   Flexion: 5  Extension: 4-    Right Elbow   Flexion: 5  Extension: 4+    Left Wrist/Hand   Wrist extension: 4+  Wrist flexion: 4+  Thumb extension: 4+    Right Wrist/Hand   Wrist extension: 4+  Wrist flexion: 4+  Thumb extension: 4+    Left Hip   Planes of Motion   Flexion: 4+  Extension: 3+  Abduction: 4-    Right Hip   Planes of Motion   Flexion: 4+  Extension: 3+  Abduction: 4-    Left Knee   Flexion: 4+  Extension: 5    Right Knee   Flexion: 4+  Extension: 4+    Functional Assessment        Comments  5x sit to stand: 19 7 sec  TUG: 15 sec         Daily Treatment Diary     DX: Gait Abnormality  EPOC: 8/27/19  Precautions: fall risk, Out of cervical collar 7/30/19  CO-MORBIDITES: Cervical Fusion  PERSONAL FACTORS:    Manual                                                                 Exercise Diary  HEP 8/13 8/16 8/22 8/27      Recumbent Bike  8' 10' 8' 15'                 Gait Training with [facility]                      HR/TR   20 20                  Romberg Balance with UE reaching  Foam  6 Way  10x ea Foam  6 Way  10x ea Foam  6 Way  10x ea       Neutral BRANNON w  EC  20"x3  30"x3 30"x3       Sharpened Romberg  20"x2 ea 30"x2 ea 30"x2 ea       Tandem amb //bars  Foam3 laps Foam3 laps No foam  3 laps       Biodex   5' FWD/ LAT Wt Shift 8' FWD/ LAT Wt Shift 8' weight shift /  stability         Mini Squats  10 10 5"x10       FWD Lunge  10x ea 10x2 10 ea       LAT Lunge  10x ea 10 10 ea                  Std Hip Flex           Std Hip ABD           Std Hip Ext           Leg Press   55#  x12 55#  10x2                  Sit to stand on high low table  18"x  10 17"x  10 17"x  10       SLR   3#  10 ea 3#  10x2 ea       TB H/L hip ABD   BTB  10x2 BTB  10x2       Bridges   5"x15 5"x15  W   MI                  Floor to Standing Transfer Training  8 min  Revisit  In future                        Modalities

## 2019-08-30 ENCOUNTER — OFFICE VISIT (OUTPATIENT)
Dept: PHYSICAL THERAPY | Facility: CLINIC | Age: 69
End: 2019-08-30
Payer: MEDICARE

## 2019-08-30 DIAGNOSIS — R26.9 GAIT ABNORMALITY: Primary | ICD-10-CM

## 2019-08-30 PROCEDURE — 97110 THERAPEUTIC EXERCISES: CPT

## 2019-08-30 PROCEDURE — 97112 NEUROMUSCULAR REEDUCATION: CPT

## 2019-08-30 NOTE — PROGRESS NOTES
Daily Note     Today's date: 2019  Patient name: Horace Hitchcock  : 1950  MRN: [id number]  Referring provider: Floresita Montoya MD  Dx:   Encounter Diagnosis     ICD-10-CM    1  Gait abnormality R26 9                   Subjective: Pt  Stated he is doing well today-offers no complaints      Objective: See treatment diary below      Assessment: Continued to challenge pt 's balance while standing on uneven surfaces and reaching  Pt  Continues to be challenged when reaching outside of his BRANNON  Positive sway and but able to self correct with modified tandem stance  Eyes closed also very challenging for pt  But has improved  Continues to be able to progress sit/stand xfer from lower heights  He continues to build strength in LE's and will revisit standing from floor as he progresses  Tolerated treatment well  Patient demonstrated fatigue post treatment, exhibited good technique with therapeutic exercises and would benefit from continued PT      Plan: Continue per plan of care  Progress treatment as tolerated            Daily Treatment Diary     DX: Gait Abnormality  EPOC: 19  Precautions: fall risk, Out of cervical collar 19  CO-MORBIDITES: Cervical Fusion  PERSONAL FACTORS:    Manual                                                                 Exercise Diary  HEP      Recumbent Bike  8' 10' 8' 15' 10'                Gait Training with [facility]                      HR/TR   20 20  20                Romberg Balance with UE reaching  Foam  6 Way  10x ea Foam  6 Way  10x ea Foam  6 Way  10x ea  Foam  6 Way  10x ea     Neutral BRANNON w  EC  20"x3  30"x3 30"x3  30"x3     Sharpened Romberg  20"x2 ea 30"x2 ea 30"x2 ea  30"x3     Tandem amb //bars  Foam3 laps Foam3 laps No foam  3 laps  No foam  3 laps     Biodex   5' FWD/ LAT Wt Shift 8' FWD/ LAT Wt Shift 8' weight shift /  stability    8' weight shift /  stability     Mini Squats  10 10 5"x10  5"x10     FWD Lunge  10x ea 10x2 10 ea  10 ea LAT Lunge  10x ea 10 10 ea  10 ea                Std Hip Flex           Std Hip ABD           Std Hip Ext           Leg Press   55#  x12 55#  10x2  65#  10x2                Sit to stand on high low table  18"x  10 17"x  10 17"x  10  17"x  10     SLR   3#  10 ea 3#  10x2 ea       TB H/L hip ABD   BTB  10x2 BTB  10x2       Bridges   5"x15 5"x15  W   OK                  Floor to Standing Transfer Training  8 min  Revisit  In future                        Modalities

## 2019-09-04 ENCOUNTER — OFFICE VISIT (OUTPATIENT)
Dept: PHYSICAL THERAPY | Facility: CLINIC | Age: 69
End: 2019-09-04
Payer: MEDICARE

## 2019-09-04 DIAGNOSIS — R26.9 GAIT ABNORMALITY: Primary | ICD-10-CM

## 2019-09-04 PROCEDURE — 97110 THERAPEUTIC EXERCISES: CPT

## 2019-09-04 PROCEDURE — 97112 NEUROMUSCULAR REEDUCATION: CPT

## 2019-09-04 NOTE — PROGRESS NOTES
Daily Note     Today's date: 2019  Patient name: Parminder Cummings  : 1950  MRN: [id number]  Referring provider: Polo Walker MD  Dx:   Encounter Diagnosis     ICD-10-CM    1  Gait abnormality R26 9                   Subjective: Pt  Stated he is doing well today-offers no complaints      Objective: See treatment diary below      Assessment: Continued to challenge pt 's balance while standing on uneven surfaces and reaching  Pt  Continues to be challenged when reaching outside of his BRANNON  Positive sway but able to self correct with modified tandem stance  Eyes closed also very challenging for pt  But has improved some  Continues to be able to progress sit/stand xfer from lower heights without UE's on thighs  He continues to build strength in LE's and will revisit standing from floor as he progresses  Tolerated treatment well  Patient demonstrated fatigue post treatment, exhibited good technique with therapeutic exercises and would benefit from continued PT      Plan: Continue per plan of care  Progress treatment as tolerated            Daily Treatment Diary     DX: Gait Abnormality  EPOC: 19  Precautions: fall risk, Out of cervical collar 19  CO-MORBIDITES: Cervical Fusion  PERSONAL FACTORS:    Manual                                                                 Exercise Diary  HEP     Recumbent Bike  8' 10' 8' 15' 10' 12'               Gait Training with [address]                      HR/TR   20 20  20 20               Romberg Balance with UE reaching  Foam  6 Way  10x ea Foam  6 Way  10x ea Foam  6 Way  10x ea  Foam  6 Way  10x ea Foam  6 Way  10x ea    Neutral BRANNON w  EC  20"x3  30"x3 30"x3  30"x3 30"x3    Sharpened Romberg  20"x2 ea 30"x2 ea 30"x2 ea  30"x3 30"x3    Tandem amb //bars  Foam3 laps Foam3 laps No foam  3 laps  No foam  3 laps No foam  3 laps    Biodex   5' FWD/ LAT Wt Shift 8' FWD/ LAT Wt Shift 8' weight shift /  stability    8' weight shift /  stability Mini Squats  10 10 5"x10  5"x10 5"x10    FWD Lunge  10x ea 10x2 10 ea  10 ea 10 ea    LAT Lunge  10x ea 10 10 ea  10 ea 10 ea               Std Hip Flex           Std Hip ABD           Std Hip Ext           Leg Press   55#  x12 55#  10x2  65#  10x2 65#  10x2               Sit to stand on high low table  18"x  10 17"x  10 17"x  10  17"x  10 17"x10    SLR   3#  10 ea 3#  10x2 ea   3#  10x2    TB H/L hip ABD   BTB  10x2 BTB  10x2   BTB  10x2    Bridges   5"x15 5"x15  W  CA   5"x15  W   CA    LAQ       3#  10x2               Floor to Standing Transfer Training  8 min  Revisit  In future                        Modalities

## 2019-09-06 ENCOUNTER — APPOINTMENT (OUTPATIENT)
Dept: PHYSICAL THERAPY | Facility: CLINIC | Age: 69
End: 2019-09-06
Payer: MEDICARE

## 2019-09-10 ENCOUNTER — OFFICE VISIT (OUTPATIENT)
Dept: PHYSICAL THERAPY | Facility: CLINIC | Age: 69
End: 2019-09-10
Payer: MEDICARE

## 2019-09-10 DIAGNOSIS — R26.9 GAIT ABNORMALITY: Primary | ICD-10-CM

## 2019-09-10 PROCEDURE — 97112 NEUROMUSCULAR REEDUCATION: CPT

## 2019-09-10 PROCEDURE — 97110 THERAPEUTIC EXERCISES: CPT

## 2019-09-10 NOTE — PROGRESS NOTES
Daily Note     Today's date: 9/10/2019  Patient name: Angel Roa  : 1950  MRN: [id number]  Referring provider: Sirisha Stapleton MD  Dx:   Encounter Diagnosis     ICD-10-CM    1  Gait abnormality R26 9                   Subjective: Pt  Stated he is doing well today- offers no complaints      Objective: See treatment diary below      Assessment: Continued to challenge pt 's balance while standing on uneven surfaces and reaching  Pt  Continues to be challenged when reaching outside of his BRANNON  Less sway today and able to self correct  Eyes closed continues to be challenging for pt  But has improved some  Revisited standing from floor to cane in one hand for stability and hand held assist on other side to simulate help from pt 's wife  He continues to build strength in LE's and will revisit standing from floor as he progresses  Tolerated treatment well  Patient demonstrated fatigue post treatment, exhibited good technique with therapeutic exercises and would benefit from continued PT      Plan: Continue per plan of care  Progress treatment as tolerated            Daily Treatment Diary     DX: Gait Abnormality  EPOC: 19  Precautions: fall risk, Out of cervical collar 19  CO-MORBIDITES: Cervical Fusion  PERSONAL FACTORS:    Manual                                                                 Exercise Diary  HEP 8/13 8/16 8/22 8/27 8/30 9/4 9/10   Recumbent Bike  8' 10' 8' 15' 10' 12' 10'              Gait Training with [address]                      HR/TR   20 20  20 20 20              Romberg Balance with UE reaching  Foam  6 Way  10x ea Foam  6 Way  10x ea Foam  6 Way  10x ea  Foam  6 Way  10x ea Foam  6 Way  10x ea Foam  6 Way  10x ea   Neutral BRANNON w  EC  20"x3  30"x3 30"x3  30"x3 30"x3 30"x3   Sharpened Romberg  20"x2 ea 30"x2 ea 30"x2 ea  30"x3 30"x3 30"x3   Tandem amb //bars  Foam3 laps Foam3 laps No foam  3 laps  No foam  3 laps No foam  3 laps No foam  3 laps   Biodex   5' FWD/ LAT Wt Shift 8' FWD/ LAT Wt Shift 8' weight shift /  stability    8' weight shift /  stability     Mini Squats  10 10 5"x10  5"x10 5"x10 5"x10   FWD Lunge  10x ea 10x2 10 ea  10 ea 10 ea 10 ea   LAT Lunge  10x ea 10 10 ea  10 ea 10 ea 10 ea              Std Hip Flex           Std Hip ABD           Std Hip Ext           Leg Press   55#  x12 55#  10x2  65#  10x2 65#  10x2 75#  10x2              Sit to stand on high low table  18"x  10 17"x  10 17"x  10  17"x  10 17"x10    SLR   3#  10 ea 3#  10x2 ea   3#  10x2 4#  10x2   TB H/L hip ABD   BTB  10x2 BTB  10x2   BTB  10x2 MTB  10x2   Bridges   5"x15 5"x15  W  SD   5"x15  W  SD 5"x15  W   SD   LAQ       3#  10x2 4#  10x2              Floor to Standing Transfer Training  8 min  Revisit  In future      Cane & HHA  x2                  Modalities

## 2019-09-13 ENCOUNTER — OFFICE VISIT (OUTPATIENT)
Dept: PHYSICAL THERAPY | Facility: CLINIC | Age: 69
End: 2019-09-13
Payer: MEDICARE

## 2019-09-13 DIAGNOSIS — R26.9 GAIT ABNORMALITY: Primary | ICD-10-CM

## 2019-09-13 PROCEDURE — 97112 NEUROMUSCULAR REEDUCATION: CPT

## 2019-09-13 PROCEDURE — 97110 THERAPEUTIC EXERCISES: CPT

## 2019-09-13 NOTE — PROGRESS NOTES
Daily Note     Today's date: 2019  Patient name: Brian Power  : 1950  MRN: [id number]  Referring provider: Donal Blackwood MD  Dx:   Encounter Diagnosis     ICD-10-CM    1  Gait abnormality R26 9                   Subjective: Pt  Stated he feels he doesn't have much energy today  Objective: See treatment diary below      Assessment: Pt  Continues to be challenged by balance TE's but has continued to improve and gain strength  Added perturbations to standing balance today  Tolerated treatment well  Patient demonstrated fatigue post treatment, exhibited good technique with therapeutic exercises and would benefit from continued PT      Plan: Continue per plan of care  Progress treatment as tolerated  Daily Treatment Diary     DX: Gait Abnormality  EPOC: 19  Precautions: fall risk, Out of cervical collar 19  CO-MORBIDITES: Cervical Fusion  PERSONAL FACTORS:    Manual                                                                 Exercise Diary  HEP          Recumbent Bike  8'                    Gait Training with [facility]                      HR/TR  20                    Romberg Balance with UE reaching  Foam  6 Way  10x ea         Neutral BRANNON w  EC  np         Sharpened Romberg  W  perturbations   30"x3         Tandem amb //bars  np         Biodex            Mini Squats  5"x10         FWD Lunge  10 ea         LAT Lunge  10 ea                    Std Hip Flex           Std Hip ABD           Std Hip Ext           Leg Press  75#  10x2                    Sit to stand on high low table  17"  x10         SLR  4#  10x2         TB H/L hip ABD  MTB  10x2         Bridges  5"x15  W   MT         LAQ  4#  10x2                    Floor to Standing Transfer Training Cane & HHA  x2 np                        Modalities

## 2019-09-17 ENCOUNTER — OFFICE VISIT (OUTPATIENT)
Dept: PHYSICAL THERAPY | Facility: CLINIC | Age: 69
End: 2019-09-17
Payer: MEDICARE

## 2019-09-17 DIAGNOSIS — R26.9 GAIT ABNORMALITY: Primary | ICD-10-CM

## 2019-09-17 PROCEDURE — 97112 NEUROMUSCULAR REEDUCATION: CPT

## 2019-09-17 PROCEDURE — 97110 THERAPEUTIC EXERCISES: CPT

## 2019-09-17 NOTE — PROGRESS NOTES
Daily Note     Today's date: 2019  Patient name: Paulo Peres  : 1950  MRN: [id number]  Referring provider: Joey Thompson MD  Dx:   Encounter Diagnosis     ICD-10-CM    1  Gait abnormality R26 9                   Subjective: Pt  Stated he feels better today than last visit and offers no complaints  Objective: See treatment diary below      Assessment: Pt  Continues to be challenged by balance TE's but has continued to improve and gain strength  Able to add head turning with Romberg stance with good tolerance  Increased stability with eyes closed and sharpened Romberg noted today as well  Tolerated treatment well  Patient demonstrated fatigue post treatment, exhibited good technique with therapeutic exercises and would benefit from continued PT      Plan: Continue per plan of care  Progress treatment as tolerated  Daily Treatment Diary     DX: Gait Abnormality  EPOC: 19  Precautions: fall risk, Out of cervical collar 19  CO-MORBIDITES: Cervical Fusion  PERSONAL FACTORS:    Manual                                                                 Exercise Diary  HEP         Recumbent Bike  8' 10'                   Gait Training with [facility]                      Romberg w  Head turns   10x ea        HR/TR  20         Rocker board  Lat/fwd/bwd   20"x2 ea        Romberg Balance with UE reaching  Foam  6 Way  10x ea Foam  6 Way  10x ea        Neutral BRANNON w  EC  np 30"x2        Sharpened Romberg  W  perturbations   30"x3 W  perturbations   30"x3        Tandem amb //bars  np 2 laps        Biodex            Mini Squats  5"x10 5"x10        FWD Lunge  10 ea 10 ea        LAT Lunge  10 ea 10 ea                   Std Hip Flex           Std Hip ABD           Std Hip Ext           Leg Press  75#  10x2 75#  10x2                   Sit to stand on high low table  17"  x10 17"  x10        SLR  4#  10x2 4#  10x2        TB H/L hip ABD  MTB  10x2 MTB  10x2        Bridges  5"x15  W  CT 5"x15  W  OK        JENNIFER  4#  10x2 4#  10x2                   Floor to Standing Transfer Training Cane & HHA  x2 np                        Modalities

## 2019-09-20 ENCOUNTER — OFFICE VISIT (OUTPATIENT)
Dept: PHYSICAL THERAPY | Facility: CLINIC | Age: 69
End: 2019-09-20
Payer: MEDICARE

## 2019-09-20 DIAGNOSIS — R26.9 GAIT ABNORMALITY: Primary | ICD-10-CM

## 2019-09-20 PROCEDURE — 97110 THERAPEUTIC EXERCISES: CPT

## 2019-09-20 PROCEDURE — 97112 NEUROMUSCULAR REEDUCATION: CPT

## 2019-09-20 NOTE — PROGRESS NOTES
Daily Note     Today's date: 2019  Patient name: Young Marshall  : 1950  MRN: [id number]  Referring provider: Catrachita Hameed MD  Dx:   Encounter Diagnosis     ICD-10-CM    1  Gait abnormality R26 9                   Subjective: Pt  Stated he feels better today than last visit and offers no complaints  Objective: See treatment diary below      Assessment: Pt  Continues to be challenged by balance TE's but has continued to improve and gain strength  Progressed to ambulating with head turns today with some minor LOB but able to self correct  Increased stability with eyes closed and sharpened Romberg noted today as well  Tolerated treatment well  Patient demonstrated fatigue post treatment, exhibited good technique with therapeutic exercises and would benefit from continued PT      Plan: Continue per plan of care  Progress treatment as tolerated  Daily Treatment Diary     DX: Gait Abnormality  EPOC: 19  Precautions: fall risk, Out of cervical collar 19  CO-MORBIDITES: Cervical Fusion  PERSONAL FACTORS:    Manual                                                                 Exercise Diary  HEP        Recumbent Bike  8' 10' 8'                  Gait Training with [facility]                      Ambulate with head turns    150'       Romberg w   Head turns   10x ea 10 ea       HR/TR  20  20       Rocker board  Lat/fwd/bwd   20"x2 ea 20"x2 ea       Romberg Balance with UE reaching  Foam  6 Way  10x ea Foam  6 Way  10x ea Foam  6 Way  10x ea       Neutral BRANNON w  EC  np 30"x2 30"x2       Sharpened Romberg  W  perturbations   30"x3 W  perturbations   30"x3 W  perturbations   30"x3       Tandem amb //bars  np 2 laps        Biodex            Mini Squats  5"x10 5"x10 5"x10       FWD Lunge  10 ea 10 ea 10 ea       LAT Lunge  10 ea 10 ea 10 ea                  Std Hip Flex           Std Hip ABD           Std Hip Ext           Leg Press  75#  10x2 75#  10x2 85#  10x2                  Sit to stand on high low table  17"  x10 17"  x10 17"  x10       SLR  4#  10x2 4#  10x2 4#  10x2       TB H/L hip ABD  MTB  10x2 MTB  10x2 MTB  10x2       Bridges  5"x15  W  AL 5"x15  W  AL 5"x15  W   AL       LAQ  4#  10x2 4#  10x2 4#  10x2                  Floor to Standing Transfer Training Cane & HHA  x2 np                        Modalities

## 2019-09-24 ENCOUNTER — OFFICE VISIT (OUTPATIENT)
Dept: PHYSICAL THERAPY | Facility: CLINIC | Age: 69
End: 2019-09-24
Payer: MEDICARE

## 2019-09-24 DIAGNOSIS — R26.9 GAIT ABNORMALITY: Primary | ICD-10-CM

## 2019-09-24 PROCEDURE — 97112 NEUROMUSCULAR REEDUCATION: CPT

## 2019-09-24 PROCEDURE — 97110 THERAPEUTIC EXERCISES: CPT

## 2019-09-24 NOTE — PROGRESS NOTES
Daily Note     Today's date: 2019  Patient name: Arianne Mustafa  : 1950  MRN: [id number]  Referring provider: Ryley Huff MD  Dx:   Encounter Diagnosis     ICD-10-CM    1  Gait abnormality R26 9                   Subjective: Pt  Stated he feels better today than last visit and offers no complaints  Objective: See treatment diary below      Assessment: Pt  Continues to be challenged by balance TE's but has continued to improve and gain strength  Progressed to ambulating with head turns today with some minor LOB is able to self correct  Increased stability with eyes closed and sharpened Romberg noted as well  Able to stand from floor from half kneeling position with cane in R hand and HHA on left from therapist x2 with minimal assistance needed  Tolerated treatment well  Patient demonstrated fatigue post treatment, exhibited good technique with therapeutic exercises and would benefit from continued PT      Plan: Continue per plan of care  Progress treatment as tolerated  Daily Treatment Diary     DX: Gait Abnormality  EPOC: 19  Precautions: fall risk, Out of cervical collar 19  CO-MORBIDITES: Cervical Fusion  PERSONAL FACTORS:    Manual                                                                 Exercise Diary  HEP       Recumbent Bike  8' 10' 8' 10'                 Gait Training with [facility]                      Ambulate with head turns    150' 300'      Romberg w   Head turns   10x ea 10 ea 10 ea      HR/TR  20  20       Rocker board  Lat/fwd/bwd   20"x2 ea 20"x2 ea       Romberg Balance with UE reaching  Foam  6 Way  10x ea Foam  6 Way  10x ea Foam  6 Way  10x ea Foam  6 Way  10x ea      Neutral BRANNON w  EC  np 30"x2 30"x2 30"x2      Sharpened Romberg     30"x3      Romberg w  perturbations  30"x3 30"x3 30"x3 30"x3 on foam      Tandem amb //bars  np 2 laps        Biodex            Mini Squats  5"x10 5"x10 5"x10 5"x10      FWD Lunge  10 ea 10 ea 10 ea 10 ea LAT Lunge  10 ea 10 ea 10 ea 10 ea                 Std Hip Flex           Std Hip ABD           Std Hip Ext           Leg Press  75#  10x2 75#  10x2 85#  10x2                  Sit to stand on high low table  17"  x10 17"  x10 17"  x10 16 5"  x10      SLR  4#  10x2 4#  10x2 4#  10x2 4#  10x2      TB H/L hip ABD  MTB  10x2 MTB  10x2 MTB  10x2       Bridges  5"x15  W  OK 5"x15  W  OK 5"x15  W  OK 5"x15  W   OK      LAQ  4#  10x2 4#  10x2 4#  10x2 4#  10x2                 Floor to Standing Transfer Training Cane & HHA  x2 np   Earna Dross & HHA  x2                     Modalities

## 2019-09-27 ENCOUNTER — TRANSCRIBE ORDERS (OUTPATIENT)
Dept: PHYSICAL THERAPY | Facility: CLINIC | Age: 69
End: 2019-09-27

## 2019-09-27 ENCOUNTER — EVALUATION (OUTPATIENT)
Dept: PHYSICAL THERAPY | Facility: CLINIC | Age: 69
End: 2019-09-27
Payer: MEDICARE

## 2019-09-27 DIAGNOSIS — R26.9 GAIT ABNORMALITY: Primary | ICD-10-CM

## 2019-09-27 PROCEDURE — 97110 THERAPEUTIC EXERCISES: CPT | Performed by: PHYSICAL THERAPIST

## 2019-09-27 NOTE — PROGRESS NOTES
PT Re-Evaluation  and PT Discharge    Today's date: 2019  Patient name: Rudy Garcia  : 1950  MRN: [id number]  Referring provider: Aleks Grider MD  Dx:   Encounter Diagnosis     ICD-10-CM    1  Gait abnormality R26 9                   Assessment  Assessment details: Since patients last reassessment he displays improvements with balance, strength, and function  Patient has made excellent progress with PT and is independent with HEP  Patient will continue to benefit from regular exercise and is ready to transition to exercising independently at the [facility]  Please contact me if you have any questions  Thank you for the opportunity to share in the care of this patient  Impairments: abnormal coordination, abnormal gait, abnormal movement, impaired balance, impaired physical strength and lacks appropriate home exercise program    Symptom irritability: moderateUnderstanding of Dx/Px/POC: good   Prognosis: good    Goals  ST  Increase (B) UE/LE strength by 1/2 MMT grade in 3 weeks  - met  2  Improve Romberg balance EC to 5 seconds in 3 weeks  - met  3  Patient to ambulate with [facility] distances in 3 weeks  - met  4  I with HEP in 3 weeks  - met  5  Improve FOTO score to 47 in 3 weeks  - met  LT  Increase (B) UE/LE strength to 4-4+/5 all planes in 6 weeks  - met  4  Improve Romberg balance EC to 15 seconds in 6 weeks  - met  5  Patient to ambulate without  community distances in 6 weeks  - met  6  I with HEP in 6 weeks  - met  7  Improve FOTO score to 53 in 6 weeks   - met      Plan  Patient would benefit from: skilled physical therapy  Planned modality interventions: thermotherapy: hydrocollator packs and cryotherapy  Planned therapy interventions: activity modification, joint mobilization, manual therapy, motor coordination training, neuromuscular re-education, patient education, self care, therapeutic activities, therapeutic exercise, graded activity, home exercise program, behavior modification, gait training, transfer training and strengthening  Plan of Care beginning date: 2019  Plan of Care expiration date: 2019  Treatment plan discussed with: patient        Subjective Evaluation    History of Present Illness  Date of surgery: 2019  Mechanism of injury: surgery  Mechanism of injury: Patient reports since his last reassessment he sees improvement with leg strength, balance, and function  He is having an easier time getting out of a chair  He reports he continues to have difficulty with stair negotiation  He reports he is independent with his HEP  Pain  Current pain ratin  At best pain ratin  At worst pain ratin    Social Support  Lives in: multiple-level home  Lives with: spouse    Patient Goals  Patient goal: Improve balance for walking around the house - met        Objective     Static Posture     Knee   Genu varus       Comments  Wide BRANNON    Neurological Testing     Sensation   Cervical/Thoracic   Left   Intact: light touch    Right   Intact: light touch    Reflexes   Left   Biceps (C5/C6): normal (2+)  Brachioradialis (C6): normal (2+)  Triceps (C7): normal (2+)  Akins's reflex: negative    Right   Biceps (C5/C6): normal (2+)  Brachioradialis (C6): normal (2+)  Triceps (C7): normal (2+)  Akins's reflex: negative    Strength/Myotome Testing   Cervical Spine     Left   Interossei strength (t1): 4+    Right   Interossei strength (t1): 4+    Left Shoulder     Planes of Motion   Flexion: 4+   Abduction: 4+   External rotation at 0°: 4+   Internal rotation at 0°: 4+     Right Shoulder     Planes of Motion   Flexion: 4+   Abduction: 4+   External rotation at 0°: 4+   Internal rotation at 0°: 4+     Left Elbow   Flexion: 5  Extension: 4-    Right Elbow   Flexion: 5  Extension: 4+    Left Wrist/Hand   Wrist extension: 4+  Wrist flexion: 4+  Thumb extension: 4+    Right Wrist/Hand   Wrist extension: 4+  Wrist flexion: 4+  Thumb extension: 4+    Left Hip Planes of Motion   Flexion: 4+  Extension: 3+  Abduction: 4    Right Hip   Planes of Motion   Flexion: 4+  Extension: 3+  Abduction: 4    Left Knee   Flexion: 5  Extension: 5    Right Knee   Flexion: 5  Extension: 4+    Functional Assessment        Comments  5x sit to stand: 15 4 sec  TU 4 sec           Daily Treatment Diary     DX: Gait Abnormality  EPOC: 19  Precautions: fall risk, Out of cervical collar 19  CO-MORBIDITES: Cervical Fusion  PERSONAL FACTORS:    Manual                                                                 Exercise Diary  HEP      Recumbent Bike  8' 10' 8' 10' 8 min                Gait Training with [facility]                      Ambulate with head turns    150' 300'      Romberg w  Head turns   10x ea 10 ea 10 ea      HR/TR  20  20       Rocker board  Lat/fwd/bwd   20"x2 ea 20"x2 ea       Romberg Balance with UE reaching  Foam  6 Way  10x ea Foam  6 Way  10x ea Foam  6 Way  10x ea Foam  6 Way  10x ea      Neutral BRANNON w  EC  np 30"x2 30"x2 30"x2      Sharpened Romberg     30"x3      Romberg w  perturbations  30"x3 30"x3 30"x3 30"x3 on foam      Tandem amb //bars  np 2 laps        Biodex            Mini Squats  5"x10 5"x10 5"x10 5"x10      FWD Lunge  10 ea 10 ea 10 ea 10 ea      LAT Lunge  10 ea 10 ea 10 ea 10 ea                 Std Hip Flex           Std Hip ABD           Std Hip Ext           Leg Press  75#  10x2 75#  10x2 85#  10x2                  Sit to stand on high low table  17"  x10 17"  x10 17"  x10 16 5"  x10      SLR  4#  10x2 4#  10x2 4#  10x2 4#  10x2      TB H/L hip ABD  MTB  10x2 MTB  10x2 MTB  10x2       Bridges  5"x15  W  NE 5"x15  W  NE 5"x15  W  NE 5"x15  W   NE      LAQ  4#  10x2 4#  10x2 4#  10x2 4#  10x2                 Floor to Standing Transfer Training Cane & HHA  x2 np   [place] & HHA  x2                     Modalities

## 2019-09-27 NOTE — LETTER
2019    MD Jazzy Epps  [address] [facility] [address]    Patient: Chase Lawson   YOB: 1950   Date of Visit: 2019     Encounter Diagnosis     ICD-10-CM    1  Gait abnormality R26 9        Dear Dr Oneil Pereira: Thank you for your recent referral of Chase Lawson  Please review the attached evaluation summary from Obinna's recent visit  Please verify that you agree with the plan of care by signing the attached order  If you have any questions or concerns, please do not hesitate to call  I sincerely appreciate the opportunity to share in the care of one of your patients and hope to have another opportunity to work with you in the near future  Sincerely,    Michael Tuttle PT      Referring Provider:      I certify that I have read the below Plan of Care and certify the need for these services furnished under this plan of treatment while under my care  Sapna Alarcon MD  [place]  [address]: [phone]          PT Re-Evaluation  and PT Discharge    Today's date: 2019  Patient name: Chase Lawson  : 1950  MRN: [id number]  Referring provider: Mauro Roman MD  Dx:   Encounter Diagnosis     ICD-10-CM    1  Gait abnormality R26 9                   Assessment  Assessment details: Since patients last reassessment he displays improvements with balance, strength, and function  Patient has made excellent progress with PT and is independent with HEP  Patient will continue to benefit from regular exercise and is ready to transition to exercising independently at the [facility]  Please contact me if you have any questions  Thank you for the opportunity to share in the care of this patient      Impairments: abnormal coordination, abnormal gait, abnormal movement, impaired balance, impaired physical strength and lacks appropriate home exercise program    Symptom irritability: moderateUnderstanding of Dx/Px/POC: good   Prognosis: good    Goals  ST  Increase (B) UE/LE strength by 1/2 MMT grade in 3 weeks  - met  2  Improve Romberg balance EC to 5 seconds in 3 weeks  - met  3  Patient to ambulate with [address] community distances in 3 weeks  - met  4  I with HEP in 3 weeks  - met  5  Improve FOTO score to 47 in 3 weeks  - met  LT  Increase (B) UE/LE strength to 4-4+/5 all planes in 6 weeks  - met  4  Improve Romberg balance EC to 15 seconds in 6 weeks  - met  5  Patient to ambulate without AD community distances in 6 weeks  - met  6  I with HEP in 6 weeks  - met  7  Improve FOTO score to 53 in 6 weeks  - met      Plan  Patient would benefit from: skilled physical therapy  Planned modality interventions: thermotherapy: hydrocollator packs and cryotherapy  Planned therapy interventions: activity modification, joint mobilization, manual therapy, motor coordination training, neuromuscular re-education, patient education, self care, therapeutic activities, therapeutic exercise, graded activity, home exercise program, behavior modification, gait training, transfer training and strengthening  Plan of Care beginning date: 2019  Plan of Care expiration date: 2019  Treatment plan discussed with: patient        Subjective Evaluation    History of Present Illness  Date of surgery: 2019  Mechanism of injury: surgery  Mechanism of injury: Patient reports since his last reassessment he sees improvement with leg strength, balance, and function  He is having an easier time getting out of a chair  He reports he continues to have difficulty with stair negotiation  He reports he is independent with his HEP      Pain  Current pain ratin  At best pain ratin  At worst pain ratin    Social Support  Lives in: multiple-level home  Lives with: spouse    Patient Goals  Patient goal: Improve balance for walking around the house - met        Objective     Static Posture     Knee Genu varus  Comments  Wide BRANNON    Neurological Testing     Sensation   Cervical/Thoracic   Left   Intact: light touch    Right   Intact: light touch    Reflexes   Left   Biceps (C5/C6): normal (2+)  Brachioradialis (C6): normal (2+)  Triceps (C7): normal (2+)  Akins's reflex: negative    Right   Biceps (C5/C6): normal (2+)  Brachioradialis (C6): normal (2+)  Triceps (C7): normal (2+)  Akins's reflex: negative    Strength/Myotome Testing   Cervical Spine     Left   Interossei strength (t1): 4+    Right   Interossei strength (t1): 4+    Left Shoulder     Planes of Motion   Flexion: 4+   Abduction: 4+   External rotation at 0°:  4+   Internal rotation at 0°:  4+     Right Shoulder     Planes of Motion   Flexion: 4+   Abduction: 4+   External rotation at 0°:  4+   Internal rotation at 0°:  4+     Left Elbow   Flexion: 5  Extension: 4-    Right Elbow   Flexion: 5  Extension: 4+    Left Wrist/Hand   Wrist extension: 4+  Wrist flexion: 4+  Thumb extension: 4+    Right Wrist/Hand   Wrist extension: 4+  Wrist flexion: 4+  Thumb extension: 4+    Left Hip   Planes of Motion   Flexion: 4+  Extension: 3+  Abduction: 4    Right Hip   Planes of Motion   Flexion: 4+  Extension: 3+  Abduction: 4    Left Knee   Flexion: 5  Extension: 5    Right Knee   Flexion: 5  Extension: 4+    Functional Assessment        Comments  5x sit to stand: 15 4 sec  TU 4 sec           Daily Treatment Diary     DX: Gait Abnormality  EPOC: 19  Precautions: fall risk, Out of cervical collar 19  CO-MORBIDITES: Cervical Fusion  PERSONAL FACTORS:    Manual                                                                 Exercise Diary  HEP      Recumbent Bike  8' 10' 8' 10' 8 min                Gait Training with [facility]                      Ambulate with head turns    150' 300'      Romberg w   Head turns   10x ea 10 ea 10 ea      HR/TR  20  20       Rocker board  Lat/fwd/bwd   20"x2 ea 20"x2 ea       Romberg Balance with UE reaching  Foam  6 Way  10x ea Foam  6 Way  10x ea Foam  6 Way  10x ea Foam  6 Way  10x ea      Neutral BRANNON w  EC  np 30"x2 30"x2 30"x2      Sharpened Romberg     30"x3      Romberg w  perturbations  30"x3 30"x3 30"x3 30"x3 on foam      Tandem amb //bars  np 2 laps        Biodex            Mini Squats  5"x10 5"x10 5"x10 5"x10      FWD Lunge  10 ea 10 ea 10 ea 10 ea      LAT Lunge  10 ea 10 ea 10 ea 10 ea                 Std Hip Flex           Std Hip ABD           Std Hip Ext           Leg Press  75#  10x2 75#  10x2 85#  10x2                  Sit to stand on high low table  17"  x10 17"  x10 17"  x10 16 5"  x10      SLR  4#  10x2 4#  10x2 4#  10x2 4#  10x2      TB H/L hip ABD  MTB  10x2 MTB  10x2 MTB  10x2       Bridges  5"x15  W  VA 5"x15  W  VA 5"x15  W  VA 5"x15  W   VA      LAQ  4#  10x2 4#  10x2 4#  10x2 4#  10x2                 Floor to Standing Transfer Training Cane & HHA  x2 np   [place] & HHA  x2                     Modalities

## 2019-11-18 ENCOUNTER — OFFICE VISIT (OUTPATIENT)
Dept: FAMILY MEDICINE CLINIC | Facility: HOSPITAL | Age: 69
End: 2019-11-18
Payer: MEDICARE

## 2019-11-18 VITALS
WEIGHT: 227 LBS | SYSTOLIC BLOOD PRESSURE: 150 MMHG | BODY MASS INDEX: 26.8 KG/M2 | DIASTOLIC BLOOD PRESSURE: 70 MMHG | HEIGHT: 77 IN | OXYGEN SATURATION: 97 % | HEART RATE: 72 BPM

## 2019-11-18 DIAGNOSIS — E78.00 HYPERCHOLESTEREMIA: ICD-10-CM

## 2019-11-18 DIAGNOSIS — I10 ESSENTIAL HYPERTENSION: ICD-10-CM

## 2019-11-18 DIAGNOSIS — K21.9 GASTROESOPHAGEAL REFLUX DISEASE WITHOUT ESOPHAGITIS: ICD-10-CM

## 2019-11-18 DIAGNOSIS — G47.00 INSOMNIA, UNSPECIFIED TYPE: Primary | ICD-10-CM

## 2019-11-18 DIAGNOSIS — Z95.1 S/P CABG (CORONARY ARTERY BYPASS GRAFT): ICD-10-CM

## 2019-11-18 DIAGNOSIS — J90 PLEURAL EFFUSION: ICD-10-CM

## 2019-11-18 PROCEDURE — 99214 OFFICE O/P EST MOD 30 MIN: CPT | Performed by: INTERNAL MEDICINE

## 2019-11-18 RX ORDER — THIAMINE MONONITRATE (VIT B1) 100 MG
TABLET ORAL EVERY 24 HOURS
COMMUNITY
Start: 2019-06-26 | End: 2020-12-21 | Stop reason: SDUPTHER

## 2019-11-18 RX ORDER — POTASSIUM CHLORIDE 750 MG/1
TABLET, FILM COATED, EXTENDED RELEASE ORAL
Refills: 3 | COMMUNITY
Start: 2019-11-12

## 2019-11-18 RX ORDER — FOLIC ACID 1 MG/1
TABLET ORAL EVERY 24 HOURS
COMMUNITY
Start: 2019-06-06

## 2019-11-18 NOTE — PROGRESS NOTES
Subjective:   Chief Complaint   Patient presents with    Well Check     NP Pt has tremors and balance issues,,no appetite, SOB, on set 4 years ago  Patient ID: Carol Boyer is a 68 y o  male  New patient  Here to establish care  Multiple issues which are chronic  Wife is not happy with this prior physician  Patient describes balance problems, poor appetite,  Poor sleep  Ambien is not any help  Admits to 16 oz of vodka daily  Wife notes some sleep eating  Has had falls  Tremors and did see urology  Hxof cabg and sees cardiology at [facility]      The following portions of the patient's history were reviewed and updated as appropriate: allergies, current medications, past family history, past medical history, past social history, past surgical history and problem list     Review of Systems   Constitutional: Negative for fatigue and fever  HENT: Negative for hearing loss  Eyes: Negative for visual disturbance  Respiratory: Negative for cough, chest tightness, shortness of breath and wheezing  Cardiovascular: Negative for chest pain, palpitations and leg swelling  Gastrointestinal: Negative for abdominal pain, diarrhea and nausea  Genitourinary: Negative for dysuria and hematuria  Musculoskeletal: Positive for joint swelling  Negative for arthralgias  Neurological: Positive for tremors and weakness  Negative for dizziness, numbness and headaches  Psychiatric/Behavioral: Negative for confusion and dysphoric mood  All other systems reviewed and are negative          Current Outpatient Medications on File Prior to Visit   Medication Sig Dispense Refill    aspirin (ECOTRIN LOW STRENGTH) 81 mg EC tablet Take 81 mg by mouth daily      folic acid (FOLVITE) 1 mg tablet every 24 hours      metoprolol tartrate (LOPRESSOR) 25 mg tablet Take 25 mg by mouth every 12 (twelve) hours      pantoprazole (PROTONIX) 40 mg tablet Take 40 mg by mouth daily      Polyethylene Glycol 3350 (MIRALAX PO) every 24 hours      potassium chloride (K-DUR) 10 mEq tablet ONE TABLET DAILY - START 11/14/19  3    pravastatin (PRAVACHOL) 20 mg tablet Take 20 mg by mouth daily      thiamine (VITAMIN B1) 100 mg tablet every 24 hours      zolpidem (AMBIEN CR) 12 5 MG CR tablet Take 12 5 mg by mouth daily at bedtime as needed for sleep      doxepin (SINEquan) 25 mg capsule Take by mouth daily at bedtime      sildenafil (VIAGRA) 100 mg tablet Take 100 mg by mouth daily as needed for erectile dysfunction       No current facility-administered medications on file prior to visit  Objective:  Vitals:    11/18/19 1605   BP: 150/70   Pulse: 72   SpO2: 97%   Weight: 103 kg (227 lb)   Height: 6' 5" (1 956 m)      Physical Exam   Constitutional: He is oriented to person, place, and time  He appears well-developed and well-nourished  Eyes: Pupils are equal, round, and reactive to light  Neck: Normal range of motion  Neck supple  No thyromegaly present  Cardiovascular: Normal rate, regular rhythm, normal heart sounds and intact distal pulses  No murmur heard  Pulmonary/Chest: Effort normal and breath sounds normal  He has no wheezes  He has no rales  Abdominal: Soft  Bowel sounds are normal  There is no tenderness  Musculoskeletal: Normal range of motion  He exhibits no edema, tenderness or deformity  Lymphadenopathy:     He has no cervical adenopathy  Neurological: He is alert and oriented to person, place, and time  He has normal reflexes  Skin: Skin is warm and dry  Psychiatric: He has a normal mood and affect  Nursing note and vitals reviewed  Assessment/Plan:    S/P CABG (coronary artery bypass graft)  Hx of CABG 2014  Sees [facility] cardiology       Diagnoses and all orders for this visit:    Insomnia, unspecified type  -     TSH, 3rd generation; Future    Hypercholesteremia  -     Lipid Panel with Direct LDL reflex;  Future    Gastroesophageal reflux disease without esophagitis  - CBC and differential; Future    Essential hypertension    S/P CABG (coronary artery bypass graft)  -     Comprehensive metabolic panel;  Future    Pleural effusion  -     CT chest w contrast; Future    Other orders  -     thiamine (VITAMIN B1) 100 mg tablet; every 24 hours  -     folic acid (FOLVITE) 1 mg tablet; every 24 hours  -     Polyethylene Glycol 3350 (MIRALAX PO); every 24 hours  -     potassium chloride (K-DUR) 10 mEq tablet; ONE TABLET DAILY - START 11/14/19

## 2019-11-23 LAB
ALBUMIN SERPL-MCNC: 4 G/DL (ref 3.6–5.1)
ALBUMIN/GLOB SERPL: 1.1 (CALC) (ref 1–2.5)
ALP SERPL-CCNC: 57 U/L (ref 40–115)
ALT SERPL-CCNC: 13 U/L (ref 9–46)
AST SERPL-CCNC: 26 U/L (ref 10–35)
BASOPHILS # BLD AUTO: 27 CELLS/UL (ref 0–200)
BASOPHILS NFR BLD AUTO: 0.4 %
BILIRUB SERPL-MCNC: 1.6 MG/DL (ref 0.2–1.2)
BUN SERPL-MCNC: 12 MG/DL (ref 7–25)
BUN/CREAT SERPL: ABNORMAL (CALC) (ref 6–22)
CALCIUM SERPL-MCNC: 9.3 MG/DL (ref 8.6–10.3)
CHLORIDE SERPL-SCNC: 103 MMOL/L (ref 98–110)
CHOLEST SERPL-MCNC: 138 MG/DL
CHOLEST/HDLC SERPL: 3.2 (CALC)
CO2 SERPL-SCNC: 24 MMOL/L (ref 20–32)
CREAT SERPL-MCNC: 1.06 MG/DL (ref 0.7–1.18)
EOSINOPHIL # BLD AUTO: 27 CELLS/UL (ref 15–500)
EOSINOPHIL NFR BLD AUTO: 0.4 %
ERYTHROCYTE [DISTWIDTH] IN BLOOD BY AUTOMATED COUNT: 13.1 % (ref 11–15)
GLOBULIN SER CALC-MCNC: 3.5 G/DL (CALC) (ref 1.9–3.7)
GLUCOSE SERPL-MCNC: 81 MG/DL (ref 65–99)
HCT VFR BLD AUTO: 41.5 % (ref 38.5–50)
HDLC SERPL-MCNC: 43 MG/DL
HGB BLD-MCNC: 14 G/DL (ref 13.2–17.1)
LDLC SERPL CALC-MCNC: 78 MG/DL (CALC)
LYMPHOCYTES # BLD AUTO: 1541 CELLS/UL (ref 850–3900)
LYMPHOCYTES NFR BLD AUTO: 23 %
MCH RBC QN AUTO: 31.5 PG (ref 27–33)
MCHC RBC AUTO-ENTMCNC: 33.7 G/DL (ref 32–36)
MCV RBC AUTO: 93.5 FL (ref 80–100)
MONOCYTES # BLD AUTO: 777 CELLS/UL (ref 200–950)
MONOCYTES NFR BLD AUTO: 11.6 %
NEUTROPHILS # BLD AUTO: 4328 CELLS/UL (ref 1500–7800)
NEUTROPHILS NFR BLD AUTO: 64.6 %
NONHDLC SERPL-MCNC: 95 MG/DL (CALC)
PLATELET # BLD AUTO: 204 THOUSAND/UL (ref 140–400)
PMV BLD REES-ECKER: 12 FL (ref 7.5–12.5)
POTASSIUM SERPL-SCNC: 4 MMOL/L (ref 3.5–5.3)
PROT SERPL-MCNC: 7.5 G/DL (ref 6.1–8.1)
RBC # BLD AUTO: 4.44 MILLION/UL (ref 4.2–5.8)
SL AMB EGFR AFRICAN AMERICAN: 81 ML/MIN/1.73M2
SL AMB EGFR NON AFRICAN AMERICAN: 70 ML/MIN/1.73M2
SODIUM SERPL-SCNC: 139 MMOL/L (ref 135–146)
TRIGL SERPL-MCNC: 85 MG/DL
TSH SERPL-ACNC: 3.83 MIU/L (ref 0.4–4.5)
WBC # BLD AUTO: 6.7 THOUSAND/UL (ref 3.8–10.8)

## 2019-11-26 ENCOUNTER — HOSPITAL ENCOUNTER (OUTPATIENT)
Dept: CT IMAGING | Facility: HOSPITAL | Age: 69
Discharge: HOME | End: 2019-11-26
Attending: INTERNAL MEDICINE
Payer: MEDICARE

## 2019-11-26 DIAGNOSIS — J90 PLEURAL EFFUSION: ICD-10-CM

## 2019-11-26 PROCEDURE — 71260 CT THORAX DX C+: CPT

## 2019-11-26 RX ADMIN — IOHEXOL 85 ML: 350 INJECTION, SOLUTION INTRAVENOUS at 11:34

## 2019-12-02 ENCOUNTER — TELEPHONE (OUTPATIENT)
Dept: FAMILY MEDICINE CLINIC | Facility: HOSPITAL | Age: 69
End: 2019-12-02

## 2019-12-02 DIAGNOSIS — Z95.1 S/P CABG (CORONARY ARTERY BYPASS GRAFT): Primary | ICD-10-CM

## 2019-12-02 DIAGNOSIS — J90 PLEURAL EFFUSION ON LEFT: ICD-10-CM

## 2019-12-02 RX ORDER — FUROSEMIDE 20 MG/1
20 TABLET ORAL DAILY
Qty: 30 TABLET | Refills: 5 | Status: SHIPPED | OUTPATIENT
Start: 2019-12-02 | End: 2020-01-15 | Stop reason: SDUPTHER

## 2019-12-02 NOTE — TELEPHONE ENCOUNTER
Dr Brown Tanner addressed results, I believe she sent it back to internal medical (clinical), she plans to contact cardiology and start patient on diuretic

## 2019-12-04 ENCOUNTER — OFFICE VISIT (OUTPATIENT)
Dept: FAMILY MEDICINE CLINIC | Facility: HOSPITAL | Age: 69
End: 2019-12-04
Payer: MEDICARE

## 2019-12-04 VITALS
HEIGHT: 77 IN | HEART RATE: 75 BPM | BODY MASS INDEX: 27.28 KG/M2 | OXYGEN SATURATION: 97 % | WEIGHT: 231 LBS | SYSTOLIC BLOOD PRESSURE: 140 MMHG | DIASTOLIC BLOOD PRESSURE: 82 MMHG

## 2019-12-04 DIAGNOSIS — E78.00 HYPERCHOLESTEREMIA: ICD-10-CM

## 2019-12-04 DIAGNOSIS — F10.10 ALCOHOL ABUSE: Primary | ICD-10-CM

## 2019-12-04 DIAGNOSIS — K21.9 GASTROESOPHAGEAL REFLUX DISEASE WITHOUT ESOPHAGITIS: ICD-10-CM

## 2019-12-04 DIAGNOSIS — E66.3 OVERWEIGHT (BMI 25.0-29.9): ICD-10-CM

## 2019-12-04 DIAGNOSIS — J90 PLEURAL EFFUSION: ICD-10-CM

## 2019-12-04 DIAGNOSIS — Z95.1 S/P CABG (CORONARY ARTERY BYPASS GRAFT): ICD-10-CM

## 2019-12-04 DIAGNOSIS — I10 ESSENTIAL HYPERTENSION: ICD-10-CM

## 2019-12-04 PROBLEM — R06.00 DYSPNEA ON EXERTION: Status: RESOLVED | Noted: 2019-06-12 | Resolved: 2019-12-04

## 2019-12-04 PROBLEM — R06.09 DYSPNEA ON EXERTION: Status: RESOLVED | Noted: 2019-06-12 | Resolved: 2019-12-04

## 2019-12-04 PROCEDURE — 99214 OFFICE O/P EST MOD 30 MIN: CPT | Performed by: INTERNAL MEDICINE

## 2019-12-04 RX ORDER — MINOCYCLINE HYDROCHLORIDE 100 MG/1
100 CAPSULE ORAL EVERY 12 HOURS
Refills: 5 | COMMUNITY
Start: 2019-11-29 | End: 2021-05-10 | Stop reason: SDUPTHER

## 2019-12-04 RX ORDER — METOPROLOL SUCCINATE 50 MG/1
50 TABLET, EXTENDED RELEASE ORAL 2 TIMES DAILY
Refills: 3 | COMMUNITY
Start: 2019-11-23 | End: 2020-08-27 | Stop reason: SDUPTHER

## 2019-12-04 NOTE — ASSESSMENT & PLAN NOTE
Long discussion about his alcohol use and abuse    Strongly encouraged to follow up with alcohol detox program

## 2019-12-04 NOTE — PROGRESS NOTES
Subjective:   Chief Complaint   Patient presents with    Follow-up     2 week         Patient ID: Sean Tripp is a 68 y o  male  HPI    The following portions of the patient's history were reviewed and updated as appropriate: allergies, current medications, past family history, past medical history, past social history, past surgical history and problem list     Review of Systems   Constitutional: Negative for fatigue and fever  HENT: Negative for hearing loss  Eyes: Negative for visual disturbance  Respiratory: Negative for cough, chest tightness, shortness of breath and wheezing  Cardiovascular: Negative for chest pain, palpitations and leg swelling  Gastrointestinal: Negative for abdominal pain, diarrhea and nausea  Genitourinary: Negative for dysuria and hematuria  Musculoskeletal: Negative for arthralgias  Neurological: Negative for dizziness, numbness and headaches  Psychiatric/Behavioral: Negative for confusion and dysphoric mood  All other systems reviewed and are negative          Current Outpatient Medications on File Prior to Visit   Medication Sig Dispense Refill    doxepin (SINEquan) 25 mg capsule Take by mouth daily at bedtime      folic acid (FOLVITE) 1 mg tablet every 24 hours      furosemide (LASIX) 20 mg tablet Take 1 tablet (20 mg total) by mouth daily 30 tablet 5    metoprolol succinate (TOPROL-XL) 50 mg 24 hr tablet Take 50 mg by mouth 2 (two) times a day  3    minocycline (MINOCIN) 100 mg capsule Take 100 mg by mouth every 12 (twelve) hours  5    pantoprazole (PROTONIX) 40 mg tablet Take 40 mg by mouth daily      Polyethylene Glycol 3350 (MIRALAX PO) every 24 hours      potassium chloride (K-DUR) 10 mEq tablet ONE TABLET DAILY - START 11/14/19  3    pravastatin (PRAVACHOL) 20 mg tablet Take 20 mg by mouth daily      sildenafil (VIAGRA) 100 mg tablet Take 100 mg by mouth daily as needed for erectile dysfunction      thiamine (VITAMIN B1) 100 mg tablet every 24 hours      zolpidem (AMBIEN CR) 12 5 MG CR tablet Take 12 5 mg by mouth daily at bedtime as needed for sleep      aspirin (ECOTRIN LOW STRENGTH) 81 mg EC tablet Take 81 mg by mouth daily      metoprolol tartrate (LOPRESSOR) 25 mg tablet Take 25 mg by mouth every 12 (twelve) hours       No current facility-administered medications on file prior to visit  Objective:  Vitals:    12/04/19 1320   BP: 140/82   Pulse: 75   SpO2: 97%   Weight: 105 kg (231 lb)   Height: 6' 5" (1 956 m)      Physical Exam   Constitutional: He is oriented to person, place, and time  He appears well-developed and well-nourished  Eyes: Pupils are equal, round, and reactive to light  Neck: Normal range of motion  Neck supple  No thyromegaly present  Cardiovascular: Normal rate, regular rhythm, normal heart sounds and intact distal pulses  No murmur heard  Pulmonary/Chest: Effort normal and breath sounds normal  He has no wheezes  He has no rales  Abdominal: Soft  Bowel sounds are normal  There is no tenderness  Musculoskeletal: Normal range of motion  He exhibits no edema, tenderness or deformity  Lymphadenopathy:     He has no cervical adenopathy  Neurological: He is alert and oriented to person, place, and time  He has normal reflexes  Skin: Skin is warm and dry  Psychiatric: He has a normal mood and affect  Nursing note and vitals reviewed  Assessment/Plan:    Pleural effusion  Now on lasix and follwing up with cardiology  Alcohol abuse  Long discussion about his alcohol use and abuse  Strongly encouraged to follow up with alcohol detox program       Overweight (BMI 25 0-29  9)  BMI Counseling: Body mass index is 27 39 kg/m²  The BMI is above normal  Nutrition recommendations include reducing portion sizes         Diagnoses and all orders for this visit:    Alcohol abuse    Essential hypertension    S/P CABG (coronary artery bypass graft)    Gastroesophageal reflux disease without esophagitis    Hypercholesteremia    Pleural effusion    Overweight (BMI 25 0-29  9)    Other orders  -     metoprolol succinate (TOPROL-XL) 50 mg 24 hr tablet; Take 50 mg by mouth 2 (two) times a day  -     minocycline (MINOCIN) 100 mg capsule;  Take 100 mg by mouth every 12 (twelve) hours

## 2019-12-04 NOTE — PATIENT INSTRUCTIONS
INcrease furosemide to 40 mg daily, take 2 of 20 mg tablets  Follow up with dr Kahlil Macedo  Strongly advise to enter a program to detox from alcohol consumption

## 2019-12-05 ENCOUNTER — TELEPHONE (OUTPATIENT)
Dept: FAMILY MEDICINE CLINIC | Facility: HOSPITAL | Age: 69
End: 2019-12-05

## 2019-12-05 DIAGNOSIS — J90 PLEURAL EFFUSION: ICD-10-CM

## 2019-12-05 DIAGNOSIS — E78.00 HYPERCHOLESTEREMIA: ICD-10-CM

## 2019-12-05 DIAGNOSIS — K21.9 GASTROESOPHAGEAL REFLUX DISEASE WITHOUT ESOPHAGITIS: ICD-10-CM

## 2019-12-05 DIAGNOSIS — Z95.1 S/P CABG (CORONARY ARTERY BYPASS GRAFT): ICD-10-CM

## 2019-12-05 DIAGNOSIS — I10 ESSENTIAL HYPERTENSION: Primary | ICD-10-CM

## 2019-12-05 NOTE — TELEPHONE ENCOUNTER
I called wife to double check on [facility] as that is what the pt usually uses, and there are no [facility] in [facility] near them  I will just do a reg lab slip--pts wife agrees  Everything up front   dk

## 2019-12-05 NOTE — TELEPHONE ENCOUNTER
MESS TO WIFE    THEY GO TO [place] UNTIL 4/30/20, LEAVING 1/7/20  THEY WILL GET CT DONE IN [place]  I WILL ATTACH OUR BUSINESS CARD WITH FAX # TO THE ORDER  WHAT LABS ARE YOU TALKING ABOUT FOR HIM TO GET DONE????  JUST HAD LABS NOV 2019  SHE TRIES HARD TO ADDRESS THE DRINKING PROBLEM, HER HANDS ARE SOMEWHAT TIED WITH THIS ISSUE  HE GOES AND GETS HIS OWN ALCOHOL     DK

## 2019-12-05 NOTE — TELEPHONE ENCOUNTER
Please call wife  I explained to patient that pleural effusions are smaller and diuretic should help reduce them  Needs follow up CT chest is 2 months and labs as well  Meantime, I got call from Dr Isaiah Martin and she, as well as I, am concerned about excessive alcohol consumption  Other medical problems  Can not be well addressed until he is not drinking  Dr Isaiah Martin gave them name and number for a detox counsellor to assist with accomplishing this    Please urge them to follow up with detox program

## 2019-12-30 PROBLEM — E66.3 OVERWEIGHT (BMI 25.0-29.9): Status: ACTIVE | Noted: 2019-12-30

## 2019-12-31 NOTE — ASSESSMENT & PLAN NOTE
BMI Counseling: Body mass index is 27 39 kg/m²  The BMI is above normal  Nutrition recommendations include reducing portion sizes

## 2020-01-15 DIAGNOSIS — J90 PLEURAL EFFUSION ON LEFT: ICD-10-CM

## 2020-01-15 DIAGNOSIS — Z95.1 S/P CABG (CORONARY ARTERY BYPASS GRAFT): ICD-10-CM

## 2020-01-15 RX ORDER — FUROSEMIDE 20 MG/1
TABLET ORAL
Qty: 180 TABLET | Refills: 0 | Status: SHIPPED | OUTPATIENT
Start: 2020-01-15 | End: 2020-04-08

## 2020-02-12 ENCOUNTER — TELEPHONE (OUTPATIENT)
Dept: FAMILY MEDICINE CLINIC | Facility: HOSPITAL | Age: 70
End: 2020-02-12

## 2020-02-18 ENCOUNTER — TELEPHONE (OUTPATIENT)
Dept: FAMILY MEDICINE CLINIC | Facility: HOSPITAL | Age: 70
End: 2020-02-18

## 2020-02-18 NOTE — TELEPHONE ENCOUNTER
Pts wife called for CT Scan results of test done on 1/30/20  Report is scanned under media    Pts wife can be reached at [phone]

## 2020-02-19 NOTE — TELEPHONE ENCOUNTER
Wife aware  They are currently in [place]  He is most definitely feeling better  Has no shortness of breath  Will proceed to ER if any changes or concerns  Wife states he has almost completely stopped drinking  CR

## 2020-02-19 NOTE — TELEPHONE ENCOUNTER
Please call wife  CT shows a large left pleural effusion  Refer to pulmonary as he likely will need this drained  If he is very short of breath, should go to ER for faster evaluation and treatment  There is no effective medication that I can give him for this  Needs to be tapped  Also ask her if his alcohol use has decreased? Again I encourage her to urge him to get into treatment  Give referral if desired

## 2020-04-08 DIAGNOSIS — J90 PLEURAL EFFUSION ON LEFT: ICD-10-CM

## 2020-04-08 DIAGNOSIS — Z95.1 S/P CABG (CORONARY ARTERY BYPASS GRAFT): ICD-10-CM

## 2020-04-08 RX ORDER — FUROSEMIDE 20 MG/1
TABLET ORAL
Qty: 180 TABLET | Refills: 0 | Status: SHIPPED | OUTPATIENT
Start: 2020-04-08 | End: 2020-07-02

## 2020-04-27 ENCOUNTER — DOCUMENTATION (OUTPATIENT)
Dept: PULMONOLOGY | Facility: CLINIC | Age: 70
End: 2020-04-27

## 2020-04-27 ENCOUNTER — TELEMEDICINE (OUTPATIENT)
Dept: PULMONOLOGY | Facility: CLINIC | Age: 70
End: 2020-04-27
Payer: MEDICARE

## 2020-04-27 DIAGNOSIS — J90 PLEURAL EFFUSION: ICD-10-CM

## 2020-04-27 DIAGNOSIS — J90 PLEURAL EFFUSION ON LEFT: Primary | ICD-10-CM

## 2020-04-27 DIAGNOSIS — R93.89 ABNORMAL CT OF THE CHEST: ICD-10-CM

## 2020-04-27 PROCEDURE — 99442 PR PHYS/QHP TELEPHONE EVALUATION 11-20 MIN: CPT | Performed by: PHYSICIAN ASSISTANT

## 2020-04-29 ENCOUNTER — HOSPITAL ENCOUNTER (OUTPATIENT)
Dept: RADIOLOGY | Facility: HOSPITAL | Age: 70
Discharge: HOME | End: 2020-04-29
Admitting: RADIOLOGY
Payer: MEDICARE

## 2020-04-29 VITALS
RESPIRATION RATE: 18 BRPM | HEART RATE: 62 BPM | SYSTOLIC BLOOD PRESSURE: 149 MMHG | OXYGEN SATURATION: 99 % | DIASTOLIC BLOOD PRESSURE: 85 MMHG

## 2020-04-29 DIAGNOSIS — J90 PLEURAL EFFUSION ON LEFT: ICD-10-CM

## 2020-04-29 LAB
GLUCOSE FLD-MCNC: 80 MG/DL
HISTIOCYTES NFR FLD: 1 %
LDH FLD L TO P-CCNC: 104 U/L
LYMPHOCYTES NFR BLD AUTO: 81 %
MONOCYTES NFR BLD AUTO: 11 %
NEUTS SEG NFR BLD AUTO: 7 %
PH BODY FLUID: 7.5
PROT FLD-MCNC: 2.7 G/DL
SITE: NORMAL
TOTAL CELLS COUNTED SPEC: 100
WBC # FLD MANUAL: 551 /UL

## 2020-04-29 PROCEDURE — 88305 TISSUE EXAM BY PATHOLOGIST: CPT | Performed by: PATHOLOGY

## 2020-04-29 PROCEDURE — 89051 BODY FLUID CELL COUNT: CPT | Performed by: PHYSICIAN ASSISTANT

## 2020-04-29 PROCEDURE — 32555 ASPIRATE PLEURA W/ IMAGING: CPT

## 2020-04-29 PROCEDURE — 88112 CYTOPATH CELL ENHANCE TECH: CPT | Performed by: PATHOLOGY

## 2020-04-29 PROCEDURE — 83615 LACTATE (LD) (LDH) ENZYME: CPT | Performed by: PHYSICIAN ASSISTANT

## 2020-04-29 PROCEDURE — 87070 CULTURE OTHR SPECIMN AEROBIC: CPT | Performed by: PHYSICIAN ASSISTANT

## 2020-04-29 PROCEDURE — 83986 ASSAY PH BODY FLUID NOS: CPT | Performed by: PHYSICIAN ASSISTANT

## 2020-04-29 PROCEDURE — 82945 GLUCOSE OTHER FLUID: CPT | Performed by: PHYSICIAN ASSISTANT

## 2020-04-29 PROCEDURE — 87205 SMEAR GRAM STAIN: CPT | Performed by: PHYSICIAN ASSISTANT

## 2020-04-29 PROCEDURE — 84157 ASSAY OF PROTEIN OTHER: CPT | Performed by: PHYSICIAN ASSISTANT

## 2020-04-29 PROCEDURE — 32555 ASPIRATE PLEURA W/ IMAGING: CPT | Performed by: RADIOLOGY

## 2020-05-02 LAB
BACTERIA SPEC BFLD CULT: NO GROWTH
GRAM STN SPEC: NORMAL
GRAM STN SPEC: NORMAL

## 2020-05-04 ENCOUNTER — HOSPITAL ENCOUNTER (OUTPATIENT)
Dept: CT IMAGING | Facility: HOSPITAL | Age: 70
Discharge: HOME | End: 2020-05-04
Payer: MEDICARE

## 2020-05-04 DIAGNOSIS — R93.89 ABNORMAL CT OF THE CHEST: ICD-10-CM

## 2020-05-04 DIAGNOSIS — J90 PLEURAL EFFUSION ON LEFT: ICD-10-CM

## 2020-05-04 PROCEDURE — 71250 CT THORAX DX C-: CPT

## 2020-05-06 ENCOUNTER — OFFICE VISIT (OUTPATIENT)
Dept: FAMILY MEDICINE CLINIC | Facility: HOSPITAL | Age: 70
End: 2020-05-06
Payer: MEDICARE

## 2020-05-06 VITALS
RESPIRATION RATE: 14 BRPM | HEIGHT: 77 IN | HEART RATE: 76 BPM | WEIGHT: 228 LBS | SYSTOLIC BLOOD PRESSURE: 130 MMHG | TEMPERATURE: 97.5 F | BODY MASS INDEX: 26.92 KG/M2 | DIASTOLIC BLOOD PRESSURE: 80 MMHG

## 2020-05-06 DIAGNOSIS — F10.10 ALCOHOL USE DISORDER, MILD, IN CONTROLLED ENVIRONMENT: ICD-10-CM

## 2020-05-06 DIAGNOSIS — G47.09 OTHER INSOMNIA: ICD-10-CM

## 2020-05-06 DIAGNOSIS — I25.10 CORONARY ARTERY DISEASE INVOLVING NATIVE CORONARY ARTERY OF NATIVE HEART WITHOUT ANGINA PECTORIS: ICD-10-CM

## 2020-05-06 DIAGNOSIS — N52.8 OTHER MALE ERECTILE DYSFUNCTION: ICD-10-CM

## 2020-05-06 DIAGNOSIS — Z12.5 SCREENING FOR PROSTATE CANCER: ICD-10-CM

## 2020-05-06 DIAGNOSIS — I10 ESSENTIAL HYPERTENSION: Primary | ICD-10-CM

## 2020-05-06 DIAGNOSIS — Z00.00 MEDICARE ANNUAL WELLNESS VISIT, SUBSEQUENT: ICD-10-CM

## 2020-05-06 DIAGNOSIS — I35.1 NONRHEUMATIC AORTIC VALVE INSUFFICIENCY: ICD-10-CM

## 2020-05-06 DIAGNOSIS — K21.9 GASTROESOPHAGEAL REFLUX DISEASE WITHOUT ESOPHAGITIS: ICD-10-CM

## 2020-05-06 DIAGNOSIS — J90 PLEURAL EFFUSION: ICD-10-CM

## 2020-05-06 DIAGNOSIS — I48.0 PAROXYSMAL ATRIAL FIBRILLATION (HCC): ICD-10-CM

## 2020-05-06 DIAGNOSIS — L71.9 ROSACEA: ICD-10-CM

## 2020-05-06 PROBLEM — R93.89 ABNORMAL CT OF THE CHEST: Status: RESOLVED | Noted: 2019-06-12 | Resolved: 2020-05-06

## 2020-05-06 PROCEDURE — 1123F ACP DISCUSS/DSCN MKR DOCD: CPT | Performed by: INTERNAL MEDICINE

## 2020-05-06 PROCEDURE — 1170F FXNL STATUS ASSESSED: CPT | Performed by: INTERNAL MEDICINE

## 2020-05-06 PROCEDURE — 1125F AMNT PAIN NOTED PAIN PRSNT: CPT | Performed by: INTERNAL MEDICINE

## 2020-05-06 PROCEDURE — G0439 PPPS, SUBSEQ VISIT: HCPCS | Performed by: INTERNAL MEDICINE

## 2020-05-06 PROCEDURE — 3075F SYST BP GE 130 - 139MM HG: CPT | Performed by: INTERNAL MEDICINE

## 2020-05-06 PROCEDURE — 1036F TOBACCO NON-USER: CPT | Performed by: INTERNAL MEDICINE

## 2020-05-06 PROCEDURE — 1160F RVW MEDS BY RX/DR IN RCRD: CPT | Performed by: INTERNAL MEDICINE

## 2020-05-06 PROCEDURE — 3008F BODY MASS INDEX DOCD: CPT | Performed by: INTERNAL MEDICINE

## 2020-05-06 PROCEDURE — 99214 OFFICE O/P EST MOD 30 MIN: CPT | Performed by: INTERNAL MEDICINE

## 2020-05-06 PROCEDURE — 3079F DIAST BP 80-89 MM HG: CPT | Performed by: INTERNAL MEDICINE

## 2020-05-06 PROCEDURE — 4040F PNEUMOC VAC/ADMIN/RCVD: CPT | Performed by: INTERNAL MEDICINE

## 2020-05-06 RX ORDER — SILDENAFIL CITRATE 20 MG/1
60 TABLET ORAL AS NEEDED
Qty: 30 TABLET | Refills: 3 | Status: SHIPPED | OUTPATIENT
Start: 2020-05-06 | End: 2020-05-08 | Stop reason: SDUPTHER

## 2020-05-07 ENCOUNTER — TELEPHONE (OUTPATIENT)
Dept: ADMINISTRATIVE | Facility: OTHER | Age: 70
End: 2020-05-07

## 2020-05-08 ENCOUNTER — TELEPHONE (OUTPATIENT)
Dept: PULMONOLOGY | Facility: CLINIC | Age: 70
End: 2020-05-08

## 2020-05-08 DIAGNOSIS — N52.8 OTHER MALE ERECTILE DYSFUNCTION: ICD-10-CM

## 2020-05-08 RX ORDER — SILDENAFIL CITRATE 20 MG/1
60 TABLET ORAL AS NEEDED
Qty: 30 TABLET | Refills: 1 | Status: SHIPPED | OUTPATIENT
Start: 2020-05-08

## 2020-05-12 ENCOUNTER — HOSPITAL ENCOUNTER (OUTPATIENT)
Dept: RADIOLOGY | Facility: HOSPITAL | Age: 70
Discharge: HOME | End: 2020-05-12
Attending: INTERNAL MEDICINE
Payer: MEDICARE

## 2020-05-12 DIAGNOSIS — J90 PLEURAL EFFUSION: ICD-10-CM

## 2020-05-12 PROCEDURE — 71046 X-RAY EXAM CHEST 2 VIEWS: CPT

## 2020-06-15 DIAGNOSIS — E78.00 HYPERCHOLESTEREMIA: Primary | ICD-10-CM

## 2020-06-15 RX ORDER — PRAVASTATIN SODIUM 20 MG
20 TABLET ORAL DAILY
Qty: 90 TABLET | Refills: 1 | Status: SHIPPED | OUTPATIENT
Start: 2020-06-15 | End: 2020-12-14

## 2020-07-02 DIAGNOSIS — J90 PLEURAL EFFUSION ON LEFT: ICD-10-CM

## 2020-07-02 DIAGNOSIS — Z95.1 S/P CABG (CORONARY ARTERY BYPASS GRAFT): ICD-10-CM

## 2020-07-02 RX ORDER — FUROSEMIDE 20 MG/1
TABLET ORAL
Qty: 180 TABLET | Refills: 0 | Status: SHIPPED | OUTPATIENT
Start: 2020-07-02 | End: 2020-09-29

## 2020-07-14 DIAGNOSIS — K21.9 GASTROESOPHAGEAL REFLUX DISEASE WITHOUT ESOPHAGITIS: Primary | ICD-10-CM

## 2020-07-14 RX ORDER — PANTOPRAZOLE SODIUM 40 MG/1
40 TABLET, DELAYED RELEASE ORAL DAILY
Qty: 30 TABLET | Refills: 3 | Status: SHIPPED | OUTPATIENT
Start: 2020-07-14 | End: 2020-10-11 | Stop reason: SDUPTHER

## 2020-07-22 ENCOUNTER — TELEPHONE (OUTPATIENT)
Dept: FAMILY MEDICINE CLINIC | Facility: HOSPITAL | Age: 70
End: 2020-07-22

## 2020-07-22 NOTE — TELEPHONE ENCOUNTER
pt had labs at [facility] 7/13- they went to his cardiologist dr Neema Nicole [facility] instead of us  I called Dr Indiana Jones office they are faxing labs over and I am placing them on dr Francisco Jim desk   Dr Indiana Jones advised that patient has hypothyroidism and needs dr Francisco Jim to advise on this    Labs on  [facility]    [phone]

## 2020-08-18 ENCOUNTER — OFFICE VISIT (OUTPATIENT)
Dept: FAMILY MEDICINE CLINIC | Facility: HOSPITAL | Age: 70
End: 2020-08-18
Payer: MEDICARE

## 2020-08-18 VITALS
DIASTOLIC BLOOD PRESSURE: 78 MMHG | HEART RATE: 64 BPM | WEIGHT: 241 LBS | SYSTOLIC BLOOD PRESSURE: 126 MMHG | RESPIRATION RATE: 14 BRPM | TEMPERATURE: 97.5 F | BODY MASS INDEX: 28.46 KG/M2 | HEIGHT: 77 IN

## 2020-08-18 DIAGNOSIS — N18.30 STAGE III CHRONIC KIDNEY DISEASE (HCC): ICD-10-CM

## 2020-08-18 DIAGNOSIS — I25.10 CORONARY ARTERY DISEASE INVOLVING NATIVE CORONARY ARTERY OF NATIVE HEART WITHOUT ANGINA PECTORIS: ICD-10-CM

## 2020-08-18 DIAGNOSIS — G47.09 OTHER INSOMNIA: ICD-10-CM

## 2020-08-18 DIAGNOSIS — I48.0 PAROXYSMAL ATRIAL FIBRILLATION (HCC): ICD-10-CM

## 2020-08-18 DIAGNOSIS — F10.10 ALCOHOL USE DISORDER, MILD, IN CONTROLLED ENVIRONMENT: ICD-10-CM

## 2020-08-18 DIAGNOSIS — J90 PLEURAL EFFUSION: Primary | ICD-10-CM

## 2020-08-18 PROCEDURE — 4040F PNEUMOC VAC/ADMIN/RCVD: CPT | Performed by: INTERNAL MEDICINE

## 2020-08-18 PROCEDURE — 99214 OFFICE O/P EST MOD 30 MIN: CPT | Performed by: INTERNAL MEDICINE

## 2020-08-18 PROCEDURE — 3008F BODY MASS INDEX DOCD: CPT | Performed by: INTERNAL MEDICINE

## 2020-08-18 PROCEDURE — 3078F DIAST BP <80 MM HG: CPT | Performed by: INTERNAL MEDICINE

## 2020-08-18 PROCEDURE — 1036F TOBACCO NON-USER: CPT | Performed by: INTERNAL MEDICINE

## 2020-08-18 PROCEDURE — 1160F RVW MEDS BY RX/DR IN RCRD: CPT | Performed by: INTERNAL MEDICINE

## 2020-08-18 PROCEDURE — 3074F SYST BP LT 130 MM HG: CPT | Performed by: INTERNAL MEDICINE

## 2020-08-18 NOTE — ASSESSMENT & PLAN NOTE
Pt's creatinine is 1 2-1 3 this year  He has stage 3 ckd  His Cardiologist ordered repeat blood tests in September  Denies LUTS

## 2020-08-18 NOTE — PROGRESS NOTES
Assessment/Plan:    Pleural effusion  Suspect pt still has some left pleural effusion  Will check cxr      Paroxysmal atrial fibrillation (HCC)  Pt is in nsr today  Continue toprol and aspirin    Coronary artery disease involving native coronary artery of native heart without angina pectoris  Denies chest pain or dyspnea  Continue aspirin, toprol and pravastatin      Other insomnia  Pt abstains from alcohol  Uses ambien cr 12 5 rarely, he cuts it in half  [place] checked [place]  Last refill 6/25/20 #30    Will prescribe ambien cr 6 25 when pt runs out of his supplies at home  I counseled pt on ambien explaining risks, benefits, alternatives including falls, cognitive decline, death if he uses alcohol with ambien  Alcohol use disorder, mild, in controlled environment  Pt rarely uses alcohol  We discussed risks including liver cirrhosis, dementia      Stage III chronic kidney disease ([facility] [address] )  Pt's creatinine is 1 2-1 3 this year  He has stage 3 ckd  His Cardiologist ordered repeat blood tests in September  Denies LUTS       Diagnoses and all orders for this visit:    Pleural effusion  -     XR chest pa & lateral; Future    Paroxysmal atrial fibrillation ([facility] [address] )    Coronary artery disease involving native coronary artery of native heart without angina pectoris    Other insomnia    Alcohol use disorder, mild, in controlled environment    Stage III chronic kidney disease ([facility] [address] )          Subjective:      Patient ID: Nelda Martinez is a 68 y o  male  Hypertension   This is a chronic problem  The current episode started more than 1 year ago  The problem is unchanged  The problem is controlled  Pertinent negatives include no chest pain, headaches, palpitations or shortness of breath           The following portions of the patient's history were reviewed and updated as appropriate: current medications, past family history, past medical history, past social history, past surgical history and problem list     Review of Systems Constitutional: Negative for fever  HENT: Negative for congestion  Eyes: Negative for visual disturbance  Respiratory: Negative for cough and shortness of breath  Cardiovascular: Negative for chest pain, palpitations and leg swelling  Gastrointestinal: Negative for abdominal pain, blood in stool and diarrhea  Endocrine: Negative for polydipsia and polyphagia  Genitourinary: Negative for difficulty urinating, dysuria and hematuria  Musculoskeletal: Negative for joint swelling, myalgias and neck stiffness  Skin: Negative for rash  Neurological: Negative for weakness, numbness and headaches  Hematological: Negative for adenopathy  Psychiatric/Behavioral: Negative for dysphoric mood  All other systems reviewed and are negative  Objective:    /78   Pulse 64   Temp 97 5 °F (36 4 °C) (Tympanic)   Resp 14   Ht 6' 5" (1 956 m)   Wt 109 kg (241 lb)   BMI 28 58 kg/m²      Physical Exam  HENT:      Head: Normocephalic  Mouth/Throat:      Pharynx: No oropharyngeal exudate  Eyes:      Conjunctiva/sclera: Conjunctivae normal    Neck:      Musculoskeletal: Neck supple  Cardiovascular:      Rate and Rhythm: Normal rate and regular rhythm  Heart sounds: Murmur present  Pulmonary:      Effort: No respiratory distress  Breath sounds: No wheezing or rales  Abdominal:      General: Bowel sounds are normal       Tenderness: There is no abdominal tenderness  Musculoskeletal:         General: No tenderness  Skin:     General: Skin is warm and dry  Neurological:      Mental Status: He is alert  Cranial Nerves: No cranial nerve deficit  Psychiatric:         Mood and Affect: Mood normal              Rancho Villeda MD  Falls Plan of Care: Balance, strength, and gait training instructions were provided  BMI Counseling: Body mass index is 28 58 kg/m²  The BMI is above normal  Nutrition recommendations include reducing portion sizes

## 2020-08-18 NOTE — ASSESSMENT & PLAN NOTE
Pt abstains from alcohol  Uses ambien cr 12 5 rarely, he cuts it in half  [place]  Last refill 6/25/20 #30    Will prescribe ambien cr 6 25 when pt runs out of his supplies at home  I counseled pt on ambien explaining risks, benefits, alternatives including falls, cognitive decline, death if he uses alcohol with ambien

## 2020-08-18 NOTE — PATIENT INSTRUCTIONS
Fall Prevention   AMBULATORY CARE:   Fall prevention  includes ways to make your home and other areas safer  It also includes ways you can move more carefully to prevent a fall  Health conditions that cause changes in your blood pressure, vision, or muscle strength and coordination may increase your risk for falls  Medicines may also increase your risk for falls if they make you dizzy, weak, or sleepy  Call 911 or have someone else call if:   · You have fallen and are unconscious  · You have fallen and cannot move part of your body  Contact your healthcare provider if:   · You have fallen and have pain or a headache  · You have questions or concerns about your condition or care  Fall prevention tips:   · Stand or sit up slowly  This may help you keep your balance and prevent falls  · Use assistive devices as directed  Your healthcare provider may suggest that you use a cane or walker to help you keep your balance  You may need to have grab bars put in your bathroom near the toilet or in the shower  · Wear shoes that fit well and have soles that   Wear shoes both inside and outside  Use slippers with good   Do not wear shoes with high heels  · Wear a personal alarm  This is a device that allows you to call 911 if you fall and need help  Ask your healthcare provider for more information  · Stay active  Exercise can help strengthen your muscles and improve your balance  Your healthcare provider may recommend water aerobics or walking  He or she may also recommend physical therapy to improve your coordination  Never start an exercise program without talking to your healthcare provider first      · Manage your medical conditions  Keep all appointments with your healthcare providers  Visit your eye doctor as directed  Home safety tips:   · Add items to prevent falls in the bathroom  Put nonslip strips on your bath or shower floor to prevent you from slipping   Use a bath mat if you do not have carpet in the bathroom  This will prevent you from falling when you step out of the bath or shower  Use a shower seat so you do not need to stand while you shower  Sit on the toilet or a chair in your bathroom to dry yourself and put on clothing  This will prevent you from losing your balance from drying or dressing yourself while you are standing  · Keep paths clear  Remove books, shoes, and other objects from walkways and stairs  Place cords for telephones and lamps out of the way so that you do not need to walk over them  Tape them down if you cannot move them  Remove small rugs  If you cannot remove a rug, secure it with double-sided tape  This will prevent you from tripping  · Install bright lights in your home  Use night lights to help light paths to the bathroom or kitchen  Always turn on the light before you start walking  · Keep items you use often on shelves within reach  Do not use a step stool to help you reach an item  · Paint or place reflective tape on the edges of your stairs  This will help you see the stairs better  Follow up with your healthcare provider as directed:  Write down your questions so you remember to ask them during your visits  © 2017 [employer] Information is for End User's use only and may not be sold, redistributed or otherwise used for commercial purposes  All illustrations and images included in CareNotes® are the copyrighted property of [employer] , [employer]  or [employer]  The above information is an  only  It is not intended as medical advice for individual conditions or treatments  Talk to your doctor, nurse or pharmacist before following any medical regimen to see if it is safe and effective for you

## 2020-08-27 DIAGNOSIS — I10 ESSENTIAL HYPERTENSION: Primary | ICD-10-CM

## 2020-08-27 RX ORDER — METOPROLOL SUCCINATE 50 MG/1
50 TABLET, EXTENDED RELEASE ORAL 2 TIMES DAILY
Qty: 60 TABLET | Refills: 5 | Status: SHIPPED | OUTPATIENT
Start: 2020-08-27 | End: 2021-02-17 | Stop reason: SDUPTHER

## 2020-09-17 ENCOUNTER — TRANSCRIBE ORDERS (OUTPATIENT)
Dept: ADMINISTRATIVE | Facility: HOSPITAL | Age: 70
End: 2020-09-17

## 2020-09-17 ENCOUNTER — LAB (OUTPATIENT)
Dept: LAB | Facility: HOSPITAL | Age: 70
End: 2020-09-17
Attending: INTERNAL MEDICINE
Payer: MEDICARE

## 2020-09-17 ENCOUNTER — HOSPITAL ENCOUNTER (OUTPATIENT)
Dept: RADIOLOGY | Facility: HOSPITAL | Age: 70
Discharge: HOME | End: 2020-09-17
Attending: INTERNAL MEDICINE
Payer: MEDICARE

## 2020-09-17 DIAGNOSIS — N18.30 STAGE III CHRONIC KIDNEY DISEASE (HCC): ICD-10-CM

## 2020-09-17 DIAGNOSIS — I48.91 ATRIAL FIBRILLATION, UNSPECIFIED TYPE (HCC): ICD-10-CM

## 2020-09-17 DIAGNOSIS — I25.10 CORONARY ARTERY DISEASE INVOLVING NATIVE CORONARY ARTERY OF NATIVE HEART WITHOUT ANGINA PECTORIS: ICD-10-CM

## 2020-09-17 DIAGNOSIS — I10 ESSENTIAL HYPERTENSION: ICD-10-CM

## 2020-09-17 DIAGNOSIS — J90 PLEURAL EFFUSION: ICD-10-CM

## 2020-09-17 DIAGNOSIS — I48.91 ATRIAL FIBRILLATION, UNSPECIFIED TYPE (HCC): Primary | ICD-10-CM

## 2020-09-17 LAB
ALBUMIN SERPL BCP-MCNC: 4 G/DL (ref 3.5–5)
ALP SERPL-CCNC: 59 U/L (ref 46–116)
ALT SERPL W P-5'-P-CCNC: 18 U/L (ref 12–78)
ANION GAP SERPL CALCULATED.3IONS-SCNC: 8 MMOL/L (ref 4–13)
AST SERPL W P-5'-P-CCNC: 20 U/L (ref 5–45)
BILIRUB SERPL-MCNC: 1.03 MG/DL (ref 0.2–1)
BUN SERPL-MCNC: 10 MG/DL (ref 5–25)
CALCIUM SERPL-MCNC: 9.6 MG/DL (ref 8.3–10.1)
CHLORIDE SERPL-SCNC: 104 MMOL/L (ref 100–108)
CO2 SERPL-SCNC: 25 MMOL/L (ref 21–32)
CREAT SERPL-MCNC: 1.13 MG/DL (ref 0.6–1.3)
GFR SERPL CREATININE-BSD FRML MDRD: 65 ML/MIN/1.73SQ M
GLUCOSE P FAST SERPL-MCNC: 83 MG/DL (ref 65–99)
POTASSIUM SERPL-SCNC: 3.8 MMOL/L (ref 3.5–5.3)
PROT SERPL-MCNC: 8.7 G/DL (ref 6.4–8.2)
SODIUM SERPL-SCNC: 137 MMOL/L (ref 136–145)
T4 FREE SERPL-MCNC: 1 NG/DL (ref 0.76–1.46)
TSH SERPL DL<=0.05 MIU/L-ACNC: 5.02 UIU/ML (ref 0.36–3.74)

## 2020-09-17 PROCEDURE — 84439 ASSAY OF FREE THYROXINE: CPT

## 2020-09-17 PROCEDURE — 71046 X-RAY EXAM CHEST 2 VIEWS: CPT

## 2020-09-17 PROCEDURE — 80053 COMPREHEN METABOLIC PANEL: CPT

## 2020-09-17 PROCEDURE — 84443 ASSAY THYROID STIM HORMONE: CPT

## 2020-09-17 PROCEDURE — 36415 COLL VENOUS BLD VENIPUNCTURE: CPT

## 2020-09-29 DIAGNOSIS — J90 PLEURAL EFFUSION ON LEFT: ICD-10-CM

## 2020-09-29 DIAGNOSIS — Z95.1 S/P CABG (CORONARY ARTERY BYPASS GRAFT): ICD-10-CM

## 2020-09-29 RX ORDER — FUROSEMIDE 20 MG/1
TABLET ORAL
Qty: 180 TABLET | Refills: 0 | Status: SHIPPED | OUTPATIENT
Start: 2020-09-29 | End: 2021-01-04 | Stop reason: SDUPTHER

## 2020-10-11 DIAGNOSIS — K21.9 GASTROESOPHAGEAL REFLUX DISEASE WITHOUT ESOPHAGITIS: ICD-10-CM

## 2020-10-11 RX ORDER — PANTOPRAZOLE SODIUM 40 MG/1
TABLET, DELAYED RELEASE ORAL
Qty: 90 TABLET | Refills: 1 | OUTPATIENT
Start: 2020-10-11

## 2020-10-11 RX ORDER — PANTOPRAZOLE SODIUM 40 MG/1
40 TABLET, DELAYED RELEASE ORAL DAILY
Qty: 30 TABLET | Refills: 5 | Status: SHIPPED | OUTPATIENT
Start: 2020-10-11 | End: 2021-04-08 | Stop reason: SDUPTHER

## 2020-11-25 ENCOUNTER — TELEPHONE (OUTPATIENT)
Dept: FAMILY MEDICINE CLINIC | Facility: HOSPITAL | Age: 70
End: 2020-11-25

## 2020-11-25 DIAGNOSIS — G47.09 OTHER INSOMNIA: Primary | ICD-10-CM

## 2020-11-25 RX ORDER — ZOLPIDEM TARTRATE 6.25 MG/1
6.25 TABLET, FILM COATED, EXTENDED RELEASE ORAL
Qty: 30 TABLET | Refills: 0 | Status: SHIPPED | OUTPATIENT
Start: 2020-11-25 | End: 2020-12-30 | Stop reason: SDUPTHER

## 2020-12-07 ENCOUNTER — TELEPHONE (OUTPATIENT)
Dept: FAMILY MEDICINE CLINIC | Facility: HOSPITAL | Age: 70
End: 2020-12-07

## 2020-12-12 ENCOUNTER — TRANSCRIBE ORDERS (OUTPATIENT)
Dept: ADMINISTRATIVE | Facility: HOSPITAL | Age: 70
End: 2020-12-12

## 2020-12-12 ENCOUNTER — LAB (OUTPATIENT)
Dept: LAB | Facility: HOSPITAL | Age: 70
End: 2020-12-12
Payer: MEDICARE

## 2020-12-12 DIAGNOSIS — I20.8 SYNCOPE ANGINOSA (HCC): ICD-10-CM

## 2020-12-12 DIAGNOSIS — E78.2 MIXED HYPERLIPIDEMIA: Primary | ICD-10-CM

## 2020-12-12 DIAGNOSIS — I10 ACCELERATED ESSENTIAL HYPERTENSION: ICD-10-CM

## 2020-12-12 DIAGNOSIS — E78.2 MIXED HYPERLIPIDEMIA: ICD-10-CM

## 2020-12-12 LAB
ALBUMIN SERPL BCP-MCNC: 3.8 G/DL (ref 3.5–5)
ALP SERPL-CCNC: 60 U/L (ref 46–116)
ALT SERPL W P-5'-P-CCNC: 21 U/L (ref 12–78)
ANION GAP SERPL CALCULATED.3IONS-SCNC: 7 MMOL/L (ref 4–13)
AST SERPL W P-5'-P-CCNC: 20 U/L (ref 5–45)
BILIRUB DIRECT SERPL-MCNC: 0.35 MG/DL (ref 0–0.2)
BILIRUB SERPL-MCNC: 0.78 MG/DL (ref 0.2–1)
BUN SERPL-MCNC: 10 MG/DL (ref 5–25)
CALCIUM SERPL-MCNC: 9.1 MG/DL (ref 8.3–10.1)
CHLORIDE SERPL-SCNC: 107 MMOL/L (ref 100–108)
CHOLEST SERPL-MCNC: 126 MG/DL (ref 50–200)
CK SERPL-CCNC: 110 U/L (ref 39–308)
CO2 SERPL-SCNC: 25 MMOL/L (ref 21–32)
CREAT SERPL-MCNC: 1.03 MG/DL (ref 0.6–1.3)
GFR SERPL CREATININE-BSD FRML MDRD: 72 ML/MIN/1.73SQ M
GLUCOSE P FAST SERPL-MCNC: 83 MG/DL (ref 65–99)
HDLC SERPL-MCNC: 51 MG/DL
LDLC SERPL CALC-MCNC: 61 MG/DL (ref 0–100)
POTASSIUM SERPL-SCNC: 3.8 MMOL/L (ref 3.5–5.3)
PROT SERPL-MCNC: 8.3 G/DL (ref 6.4–8.2)
SODIUM SERPL-SCNC: 139 MMOL/L (ref 136–145)
TRIGL SERPL-MCNC: 69 MG/DL

## 2020-12-12 PROCEDURE — 82550 ASSAY OF CK (CPK): CPT

## 2020-12-12 PROCEDURE — 80076 HEPATIC FUNCTION PANEL: CPT

## 2020-12-12 PROCEDURE — 36415 COLL VENOUS BLD VENIPUNCTURE: CPT

## 2020-12-12 PROCEDURE — 80061 LIPID PANEL: CPT

## 2020-12-12 PROCEDURE — 80048 BASIC METABOLIC PNL TOTAL CA: CPT

## 2020-12-13 DIAGNOSIS — E78.00 HYPERCHOLESTEREMIA: ICD-10-CM

## 2020-12-14 RX ORDER — PRAVASTATIN SODIUM 20 MG
TABLET ORAL
Qty: 90 TABLET | Refills: 1 | Status: SHIPPED | OUTPATIENT
Start: 2020-12-14 | End: 2021-06-01

## 2020-12-21 ENCOUNTER — OFFICE VISIT (OUTPATIENT)
Dept: FAMILY MEDICINE CLINIC | Facility: HOSPITAL | Age: 70
End: 2020-12-21
Payer: MEDICARE

## 2020-12-21 ENCOUNTER — LAB (OUTPATIENT)
Dept: LAB | Facility: HOSPITAL | Age: 70
End: 2020-12-21
Attending: INTERNAL MEDICINE
Payer: MEDICARE

## 2020-12-21 VITALS
DIASTOLIC BLOOD PRESSURE: 80 MMHG | BODY MASS INDEX: 29.05 KG/M2 | HEIGHT: 77 IN | RESPIRATION RATE: 16 BRPM | WEIGHT: 246 LBS | HEART RATE: 68 BPM | SYSTOLIC BLOOD PRESSURE: 122 MMHG

## 2020-12-21 DIAGNOSIS — E53.8 B12 DEFICIENCY: ICD-10-CM

## 2020-12-21 DIAGNOSIS — E53.8 B12 DEFICIENCY: Primary | ICD-10-CM

## 2020-12-21 DIAGNOSIS — E53.8 FOLATE DEFICIENCY: ICD-10-CM

## 2020-12-21 DIAGNOSIS — I48.0 PAROXYSMAL ATRIAL FIBRILLATION (HCC): Primary | ICD-10-CM

## 2020-12-21 DIAGNOSIS — F10.10 ALCOHOL USE DISORDER, MILD, IN CONTROLLED ENVIRONMENT: ICD-10-CM

## 2020-12-21 DIAGNOSIS — I48.0 PAROXYSMAL ATRIAL FIBRILLATION (HCC): ICD-10-CM

## 2020-12-21 DIAGNOSIS — I10 ESSENTIAL HYPERTENSION: ICD-10-CM

## 2020-12-21 DIAGNOSIS — R26.2 AMBULATORY DYSFUNCTION: ICD-10-CM

## 2020-12-21 LAB
FOLATE SERPL-MCNC: >20 NG/ML (ref 3.1–17.5)
T4 FREE SERPL-MCNC: 0.89 NG/DL (ref 0.76–1.46)
TSH SERPL DL<=0.05 MIU/L-ACNC: 5.37 UIU/ML (ref 0.36–3.74)
VIT B12 SERPL-MCNC: 182 PG/ML (ref 100–900)

## 2020-12-21 PROCEDURE — 99214 OFFICE O/P EST MOD 30 MIN: CPT | Performed by: INTERNAL MEDICINE

## 2020-12-21 PROCEDURE — 84443 ASSAY THYROID STIM HORMONE: CPT

## 2020-12-21 PROCEDURE — 82607 VITAMIN B-12: CPT

## 2020-12-21 PROCEDURE — 84439 ASSAY OF FREE THYROXINE: CPT

## 2020-12-21 PROCEDURE — 82746 ASSAY OF FOLIC ACID SERUM: CPT

## 2020-12-21 PROCEDURE — 36415 COLL VENOUS BLD VENIPUNCTURE: CPT

## 2020-12-21 RX ORDER — THIAMINE MONONITRATE (VIT B1) 100 MG
100 TABLET ORAL EVERY 24 HOURS
Qty: 30 TABLET | Refills: 5 | Status: SHIPPED | OUTPATIENT
Start: 2020-12-21

## 2020-12-21 RX ORDER — CYANOCOBALAMIN (VITAMIN B-12) 2000 MCG
2000 TABLET ORAL DAILY
Qty: 30 TABLET | Refills: 5 | Status: SHIPPED | OUTPATIENT
Start: 2020-12-21

## 2020-12-30 DIAGNOSIS — G47.09 OTHER INSOMNIA: ICD-10-CM

## 2020-12-30 RX ORDER — ZOLPIDEM TARTRATE 6.25 MG/1
6.25 TABLET, FILM COATED, EXTENDED RELEASE ORAL
Qty: 30 TABLET | Refills: 0 | Status: SHIPPED | OUTPATIENT
Start: 2020-12-30 | End: 2021-02-03 | Stop reason: SDUPTHER

## 2021-01-04 DIAGNOSIS — Z95.1 S/P CABG (CORONARY ARTERY BYPASS GRAFT): ICD-10-CM

## 2021-01-04 DIAGNOSIS — J90 PLEURAL EFFUSION ON LEFT: ICD-10-CM

## 2021-01-04 RX ORDER — FUROSEMIDE 20 MG/1
40 TABLET ORAL DAILY
Qty: 180 TABLET | Refills: 0 | Status: SHIPPED | OUTPATIENT
Start: 2021-01-04 | End: 2022-03-16 | Stop reason: SDUPTHER

## 2021-02-03 ENCOUNTER — TELEPHONE (OUTPATIENT)
Dept: FAMILY MEDICINE CLINIC | Facility: HOSPITAL | Age: 71
End: 2021-02-03

## 2021-02-03 DIAGNOSIS — G47.09 OTHER INSOMNIA: ICD-10-CM

## 2021-02-03 RX ORDER — ZOLPIDEM TARTRATE 6.25 MG/1
6.25 TABLET, FILM COATED, EXTENDED RELEASE ORAL
Qty: 30 TABLET | Refills: 0 | Status: SHIPPED | OUTPATIENT
Start: 2021-02-03 | End: 2021-03-05 | Stop reason: SDUPTHER

## 2021-02-17 DIAGNOSIS — I10 ESSENTIAL HYPERTENSION: ICD-10-CM

## 2021-02-17 RX ORDER — METOPROLOL SUCCINATE 50 MG/1
50 TABLET, EXTENDED RELEASE ORAL 2 TIMES DAILY
Qty: 60 TABLET | Refills: 5 | Status: SHIPPED | OUTPATIENT
Start: 2021-02-17 | End: 2021-08-12 | Stop reason: SDUPTHER

## 2021-03-03 DIAGNOSIS — Z23 ENCOUNTER FOR IMMUNIZATION: ICD-10-CM

## 2021-03-05 DIAGNOSIS — G47.09 OTHER INSOMNIA: ICD-10-CM

## 2021-03-05 RX ORDER — ZOLPIDEM TARTRATE 6.25 MG/1
6.25 TABLET, FILM COATED, EXTENDED RELEASE ORAL
Qty: 30 TABLET | Refills: 0 | Status: SHIPPED | OUTPATIENT
Start: 2021-03-05 | End: 2021-04-08 | Stop reason: SDUPTHER

## 2021-03-08 ENCOUNTER — IMMUNIZATIONS (OUTPATIENT)
Dept: FAMILY MEDICINE CLINIC | Facility: HOSPITAL | Age: 71
End: 2021-03-08

## 2021-03-08 DIAGNOSIS — Z23 ENCOUNTER FOR IMMUNIZATION: Primary | ICD-10-CM

## 2021-03-08 PROCEDURE — 0001A SARS-COV-2 / COVID-19 MRNA VACCINE (PFIZER-BIONTECH) 30 MCG: CPT

## 2021-03-08 PROCEDURE — 91300 SARS-COV-2 / COVID-19 MRNA VACCINE (PFIZER-BIONTECH) 30 MCG: CPT

## 2021-03-31 ENCOUNTER — OFFICE VISIT (OUTPATIENT)
Dept: FAMILY MEDICINE CLINIC | Facility: HOSPITAL | Age: 71
End: 2021-03-31
Payer: MEDICARE

## 2021-03-31 VITALS
WEIGHT: 255 LBS | DIASTOLIC BLOOD PRESSURE: 86 MMHG | BODY MASS INDEX: 30.11 KG/M2 | HEIGHT: 77 IN | HEART RATE: 76 BPM | RESPIRATION RATE: 16 BRPM | SYSTOLIC BLOOD PRESSURE: 134 MMHG

## 2021-03-31 DIAGNOSIS — E53.8 B12 DEFICIENCY: ICD-10-CM

## 2021-03-31 DIAGNOSIS — E66.9 OBESITY (BMI 30.0-34.9): ICD-10-CM

## 2021-03-31 DIAGNOSIS — F10.10 ALCOHOL USE DISORDER, MILD, IN CONTROLLED ENVIRONMENT: ICD-10-CM

## 2021-03-31 DIAGNOSIS — G47.09 OTHER INSOMNIA: ICD-10-CM

## 2021-03-31 DIAGNOSIS — J31.0 CHRONIC RHINITIS: ICD-10-CM

## 2021-03-31 DIAGNOSIS — I48.0 PAROXYSMAL ATRIAL FIBRILLATION (HCC): Primary | ICD-10-CM

## 2021-03-31 DIAGNOSIS — I10 ESSENTIAL HYPERTENSION: ICD-10-CM

## 2021-03-31 DIAGNOSIS — I25.10 CORONARY ARTERY DISEASE INVOLVING NATIVE CORONARY ARTERY OF NATIVE HEART WITHOUT ANGINA PECTORIS: ICD-10-CM

## 2021-03-31 PROCEDURE — 99214 OFFICE O/P EST MOD 30 MIN: CPT | Performed by: INTERNAL MEDICINE

## 2021-03-31 RX ORDER — IPRATROPIUM BROMIDE 42 UG/1
2 SPRAY, METERED NASAL 2 TIMES DAILY
Qty: 15 ML | Refills: 3 | Status: SHIPPED | OUTPATIENT
Start: 2021-03-31 | End: 2021-08-31 | Stop reason: HOSPADM

## 2021-03-31 NOTE — PROGRESS NOTES
Assessment/Plan:    Chronic rhinitis  Pt has chronic nasal congestion and has to clear his throat frequently daily, this has been going on for yrs  I see no evidence of infection, denies sneezing, seasonality  Will try atrovent nasal spray    Other insomnia  Insomnia is controlled with [place], denies feeling drowsy the following day, denies falls  Continue ambien   [place]    Coronary artery disease involving native coronary artery of native heart without angina pectoris  Patient has history of coronary disease, he status post CABG  Denies any chest pain or shortness of breath  Continue aspirin and pravastatin    Essential hypertension  Blood pressure is not at goal, discussed low-salt diet, weight loss  Will refer patient to nutritionist    Continue Toprol, Lasix for now    Paroxysmal atrial fibrillation [facility])  Patient has history of PAF  Currently heart rhythm is regular  Will continue metoprolol aspirin    Alcohol use disorder, mild, in controlled environment  We discussed alcohol use, this has been not increasing but not decreasing unfortunately either       Diagnoses and all orders for this visit:    Paroxysmal atrial fibrillation (HCC)    Essential hypertension  -     TSH, 3rd generation with Free T4 reflex; Future    Coronary artery disease involving native coronary artery of native heart without angina pectoris    B12 deficiency  -     Vitamin B12; Future    Chronic rhinitis  -     ipratropium (ATROVENT) 0 06 % nasal spray; 2 sprays into each nostril 2 (two) times a day    Other insomnia    Obesity (BMI 30 0-34 9)  -     Ambulatory referral to Nutrition Services; Future    Alcohol use disorder, mild, in controlled environment          Subjective:      Patient ID: Krystian Munoz is a 65 y o  male  Patient complains of runny nose, nasal congestion, frequent sore throat clearing going on for years  Denies seasonality, pruritus in the eyes, throat, nose, sneezing    Has not tried any medications yet    He will see his cardiologist at [facility] in June    He denies any chest pain, palpitations, shortness of breath, signs of bleeding  He drinks about 8 oz of vodka a day but not more  He is not very physically active        The following portions of the patient's history were reviewed and updated as appropriate: current medications, past family history, past medical history, past social history, past surgical history and problem list     Review of Systems   Constitutional: Negative for fever  HENT: Positive for congestion and rhinorrhea  Negative for sinus pressure, sneezing and sore throat  Eyes: Negative for visual disturbance  Respiratory: Negative for cough and shortness of breath  Cardiovascular: Negative for chest pain, palpitations and leg swelling  Gastrointestinal: Negative for abdominal pain, blood in stool and diarrhea  Endocrine: Negative for polydipsia  Genitourinary: Negative for difficulty urinating and hematuria  Musculoskeletal: Negative for joint swelling, myalgias and neck stiffness  Skin: Negative for rash  Neurological: Negative for weakness, numbness and headaches  Hematological: Negative for adenopathy  Psychiatric/Behavioral: Negative for dysphoric mood  All other systems reviewed and are negative  Objective:    /86   Pulse 76   Resp 16   Ht 6' 5" (1 956 m)   Wt 116 kg (255 lb)   BMI 30 24 kg/m²      Physical Exam  HENT:      Head: Normocephalic  Nose: No congestion or rhinorrhea  Mouth/Throat:      Mouth: Mucous membranes are moist       Pharynx: Oropharynx is clear  No oropharyngeal exudate  Eyes:      Conjunctiva/sclera: Conjunctivae normal    Neck:      Musculoskeletal: Neck supple  Cardiovascular:      Rate and Rhythm: Normal rate and regular rhythm  Heart sounds: No murmur  Pulmonary:      Effort: No respiratory distress  Breath sounds: No wheezing or rales     Abdominal:      General: Bowel sounds are normal       Tenderness: There is no abdominal tenderness  Musculoskeletal:         General: No tenderness  Skin:     General: Skin is warm and dry  Neurological:      Mental Status: He is alert and oriented to person, place, and time  Cranial Nerves: No cranial nerve deficit  Motor: No weakness  Gait: Gait normal    Psychiatric:         Mood and Affect: Mood normal          BMI Counseling: Body mass index is 30 24 kg/m²  The BMI is above normal  Nutrition recommendations include decreasing portion sizes  Depression Screening and Follow-up Plan: Patient's depression screening was positive with a PHQ-2 score of 0  Clincally patient does not have depression  No treatment is required  Dick Ott MD  BMI Counseling: Body mass index is 30 24 kg/m²  The BMI is above normal  Nutrition recommendations include reducing portion sizes  Patient referred to nutritionist due to patient being obese

## 2021-03-31 NOTE — ASSESSMENT & PLAN NOTE
Insomnia is controlled with [place], denies feeling drowsy the following day, denies falls  Continue ambien   [place]

## 2021-03-31 NOTE — ASSESSMENT & PLAN NOTE
Blood pressure is not at goal, discussed low-salt diet, weight loss    Will refer patient to nutritionist    Continue Toprol, Lasix for now

## 2021-03-31 NOTE — ASSESSMENT & PLAN NOTE
Patient has history of coronary disease, he status post CABG  Denies any chest pain or shortness of breath      Continue aspirin and pravastatin

## 2021-03-31 NOTE — ASSESSMENT & PLAN NOTE
Pt has chronic nasal congestion and has to clear his throat frequently daily, this has been going on for yrs  I see no evidence of infection, denies sneezing, seasonality      Will try atrovent nasal spray

## 2021-04-01 ENCOUNTER — IMMUNIZATIONS (OUTPATIENT)
Dept: FAMILY MEDICINE CLINIC | Facility: HOSPITAL | Age: 71
End: 2021-04-01

## 2021-04-01 DIAGNOSIS — Z23 ENCOUNTER FOR IMMUNIZATION: Primary | ICD-10-CM

## 2021-04-01 PROCEDURE — 0002A SARS-COV-2 / COVID-19 MRNA VACCINE (PFIZER-BIONTECH) 30 MCG: CPT

## 2021-04-01 PROCEDURE — 91300 SARS-COV-2 / COVID-19 MRNA VACCINE (PFIZER-BIONTECH) 30 MCG: CPT

## 2021-04-08 DIAGNOSIS — G47.09 OTHER INSOMNIA: ICD-10-CM

## 2021-04-08 DIAGNOSIS — K21.9 GASTROESOPHAGEAL REFLUX DISEASE WITHOUT ESOPHAGITIS: ICD-10-CM

## 2021-04-08 RX ORDER — PANTOPRAZOLE SODIUM 40 MG/1
40 TABLET, DELAYED RELEASE ORAL DAILY
Qty: 30 TABLET | Refills: 5 | Status: SHIPPED | OUTPATIENT
Start: 2021-04-08 | End: 2021-10-11 | Stop reason: SDUPTHER

## 2021-04-08 RX ORDER — ZOLPIDEM TARTRATE 6.25 MG/1
6.25 TABLET, FILM COATED, EXTENDED RELEASE ORAL
Qty: 30 TABLET | Refills: 0 | Status: SHIPPED | OUTPATIENT
Start: 2021-04-08 | End: 2021-05-10 | Stop reason: SDUPTHER

## 2021-05-10 DIAGNOSIS — L71.9 ROSACEA: Primary | ICD-10-CM

## 2021-05-10 DIAGNOSIS — G47.09 OTHER INSOMNIA: ICD-10-CM

## 2021-05-11 RX ORDER — MINOCYCLINE HYDROCHLORIDE 100 MG/1
100 CAPSULE ORAL EVERY 12 HOURS SCHEDULED
Qty: 60 CAPSULE | Refills: 5 | Status: SHIPPED | OUTPATIENT
Start: 2021-05-11 | End: 2021-06-10

## 2021-05-11 RX ORDER — ZOLPIDEM TARTRATE 6.25 MG/1
6.25 TABLET, FILM COATED, EXTENDED RELEASE ORAL
Qty: 30 TABLET | Refills: 0 | Status: SHIPPED | OUTPATIENT
Start: 2021-05-11 | End: 2021-06-10 | Stop reason: SDUPTHER

## 2021-05-17 ENCOUNTER — CLINICAL SUPPORT (OUTPATIENT)
Dept: NUTRITION | Facility: HOSPITAL | Age: 71
End: 2021-05-17
Attending: INTERNAL MEDICINE
Payer: MEDICARE

## 2021-05-17 VITALS — BODY MASS INDEX: 29.84 KG/M2 | WEIGHT: 251.6 LBS

## 2021-05-17 DIAGNOSIS — E66.9 OBESITY (BMI 30.0-34.9): ICD-10-CM

## 2021-05-17 NOTE — PROGRESS NOTES
Initial Nutrition Assessment Form    Patient Name: Sotero Nolasco    YOB: 1950    Sex: Male     Assessment Date: 5/17/2021  Start Time: 1030 Stop Time: 1130 Total Minutes: 60     Data:  Present at session: self   Parent/Patient Concerns: "I don't eat much but I am slowly putting on weight again"   Medical Dx/Reason for Referral: obesity   Past Medical History:   Diagnosis Date    Sparks esophagus     Coronary artery disease     Balance issue, essential tremors   Current Outpatient Medications   Medication Sig Dispense Refill    aspirin (ECOTRIN LOW STRENGTH) 81 mg EC tablet Take 81 mg by mouth daily      folic acid (FOLVITE) 1 mg tablet every 24 hours      furosemide (LASIX) 20 mg tablet Take 2 tablets (40 mg total) by mouth daily 180 tablet 0    ipratropium (ATROVENT) 0 06 % nasal spray 2 sprays into each nostril 2 (two) times a day 15 mL 3    metoprolol succinate (TOPROL-XL) 50 mg 24 hr tablet Take 1 tablet (50 mg total) by mouth 2 (two) times a day 60 tablet 5    minocycline (MINOCIN) 100 mg capsule Take 1 capsule (100 mg total) by mouth every 12 (twelve) hours 60 capsule 5    pantoprazole (PROTONIX) 40 mg tablet Take 1 tablet (40 mg total) by mouth daily 30 tablet 5    Polyethylene Glycol 3350 (MIRALAX PO) every 24 hours      potassium chloride (K-DUR) 10 mEq tablet 1 every other day  3    pravastatin (PRAVACHOL) 20 mg tablet TAKE 1 TABLET BY MOUTH EVERY DAY 90 tablet 1    sildenafil (REVATIO) 20 mg tablet Take 3 tablets (60 mg total) by mouth as needed (sexual activity) 30 tablet 1    thiamine (VITAMIN B1) 100 mg tablet Take 1 tablet (100 mg total) by mouth every 24 hours 30 tablet 5    vitamin B-12 (CYANOCOBALAMIN) 2000 MCG TABS Take 1 tablet (2,000 mcg total) by mouth daily 30 tablet 5    zolpidem (AMBIEN CR) 6 25 MG CR tablet Take 1 tablet (6 25 mg total) by mouth daily at bedtime as needed for sleep 30 tablet 0     No current facility-administered medications for this visit  Additional Meds/Supplements: n/a   Special Learning Needs: n/a   Height: HC Readings from Last 3 Encounters:   No data found for [facility]       Weight: slowly gaining 10#/year Wt Readings from Last 10 Encounters:   05/17/21 114 kg (251 lb 9 6 oz)   03/31/21 116 kg (255 lb)   12/21/20 112 kg (246 lb)   08/18/20 109 kg (241 lb)   05/06/20 103 kg (228 lb)   12/04/19 105 kg (231 lb)   11/18/19 103 kg (227 lb)   06/12/19 93 kg (205 lb)     Estimated body mass index is 29 84 kg/m² as calculated from the following:    Height as of 3/31/21: 6' 5" (1 956 m)  Weight as of this encounter: 114 kg (251 lb 9 6 oz)  Recent Weight Change: []Yes     [x]No  Amount:       Energy Needs: 2850kcals -0 500(kcals for 1#/wk wt loss)= 2350kcals    Allergies   Allergen Reactions    Ampicillin Lip Swelling     Other reaction(s): Unknown, Unknown Reaction    NKFA   Social History     Substance and Sexual Activity   Alcohol Use Yes    Alcohol/week: 3 0 standard drinks    Types: 3 Shots of liquor per week    Frequency: 2-3 times a week    Binge frequency: Daily or almost daily    Comment: , pts drinking is down, maybe 3 shots, but not everyday  2x/wk restaurant beer x4; daily vodka x8oz   Social History     Tobacco Use   Smoking Status Never Smoker   Smokeless Tobacco Never Used    n/a   Who shops? spouse   Who cooks? spouse   Exercise: Fairly sedentary r/t balance issues- rehab 3 different times   Prior Counseling? []Yes     [x]No  When:      Why:         Diet Hx: 2 meals a day 1 HS snack; 16 9oz bottles (in the morning) pepsi x35oz 1/day  Breakfast: water in the morning; cottage cheese 2 TBSP with peaches/fruit slightly less of fruit  (3x/wk) or honey crisp cereal- 1 5c with 2% milk1/2c   8    a m  Lunch:     p m  skips daily         Dinner: diet caffeine free pepsi; cheeseburger (no bun) chicken nuggets; 1/2 pork chop or chicken breast 3/4c macaroni and cheese baked beans or vegetables corn   3-330   p m           Snacks: scoops and cheese sauce AM -   PM -   HS - 10 7 crackers ritz type- 7 slices cheese presliced        Nutrition Diagnosis:   Overweight/obesity  related to Excess energy intake as evidenced by  Overconsumption of high-fat and/or calorie dense food or beverage       Medical Nutrition Therapy Intervention:  []Individualized Meal Plan []Understanding Lab Values   []Basic Pathophysiology of Disease []Food/Medication Interactions   []Food Diary [x]Exercise-activity 15-20mins daily   [x]Lifestyle/Behavior Modification Techniques-adequate sleep 7-9 hours sleep []Medication, Mechanism of Action   []Label Reading []Self Blood Glucose Monitoring   [x]Weight/BMI Goals-ideal 200# (back in college 40years ago) []Other -    Other Notes:bed at 12MN -1am falls asleep quickly  Bad night will sleep for 1 hr- falls back to sleep 5-7am (will sleep 2-3 hours); when drinks 4 beers and vodka will sleep 6 hours (good nights); no naps; energy, moderate to low        Comprehension: []Excellent  []Very Good  []Good  [x]Fair   []Poor    Receptivity: []Excellent  []Very Good  []Good  [x]Fair   []Poor    Expected Compliance: []Excellent  []Very Good  []Good  [x]Fair   []Poor        Goals:  1  3 meals a day no skipping   2 alcohol weekends only x1 drink   3 portions per plate method as discussed with RD       No follow-ups on file    Labs:  CMP  Lab Results   Component Value Date    K 3 8 12/12/2020     12/12/2020    CO2 25 12/12/2020    BUN 10 12/12/2020    CREATININE 1 03 12/12/2020    GLUF 83 12/12/2020    CALCIUM 9 1 12/12/2020    AST 20 12/12/2020    ALT 21 12/12/2020    ALKPHOS 60 12/12/2020    EGFR 72 12/12/2020       BMP  Lab Results   Component Value Date    CALCIUM 9 1 12/12/2020    K 3 8 12/12/2020    CO2 25 12/12/2020     12/12/2020    BUN 10 12/12/2020    CREATININE 1 03 12/12/2020       Lipids  No results found for: CHOL  Lab Results   Component Value Date    HDL 51 12/12/2020    HDL 43 11/22/2019     Lab Results   Component Value Date    LDLCALC 61 12/12/2020    LDLCALC 78 11/22/2019     Lab Results   Component Value Date    TRIG 69 12/12/2020    TRIG 85 11/22/2019     No results found for: CHOLHDL    Hemoglobin A1C  No results found for: HGBA1C    Fasting Glucose  Lab Results   Component Value Date    GLUF 83 12/12/2020       Insulin     Thyroid  Lab Results   Component Value Date    TSH 3 83 11/22/2019       Hepatic Function Panel  Lab Results   Component Value Date    ALT 21 12/12/2020    AST 20 12/12/2020    ALKPHOS 60 12/12/2020       Celiac Disease Antibody Panel  No results found for: ENDOMYSIAL IGA, GLIADIN IGA, GLIADIN IGG, IGA, TISSUE TRANSGLUT AB, TTG IGA   Iron  No results found for: IRON, TIBC, FERRITIN    Vitamins  No results found for: VITAMIN B2   No results found for: NICOTINAMIDE, NICOTINIC ACID   No results found for: VITAMINB6  Lab Results   Component Value Date    [id number] 182 12/21/2020     No results found for: VITB5  No results found for: S3BFWIFV  No results found for: THYROGLB  No results found for: VITAMIN K   No results found for: 25-HYDROXY VIT D   No components found for: VITAMINE     DSalmon MS RD LDN  [address] [id number]

## 2021-05-30 DIAGNOSIS — E78.00 HYPERCHOLESTEREMIA: ICD-10-CM

## 2021-06-01 RX ORDER — PRAVASTATIN SODIUM 20 MG
TABLET ORAL
Qty: 90 TABLET | Refills: 1 | Status: SHIPPED | OUTPATIENT
Start: 2021-06-01 | End: 2021-11-29

## 2021-06-04 ENCOUNTER — LAB (OUTPATIENT)
Dept: LAB | Facility: HOSPITAL | Age: 71
End: 2021-06-04
Attending: INTERNAL MEDICINE
Payer: MEDICARE

## 2021-06-04 ENCOUNTER — TRANSCRIBE ORDERS (OUTPATIENT)
Dept: ADMINISTRATIVE | Facility: HOSPITAL | Age: 71
End: 2021-06-04

## 2021-06-04 DIAGNOSIS — I10 ESSENTIAL HYPERTENSION, MALIGNANT: ICD-10-CM

## 2021-06-04 DIAGNOSIS — I10 ESSENTIAL HYPERTENSION, MALIGNANT: Primary | ICD-10-CM

## 2021-06-04 DIAGNOSIS — E53.8 B12 DEFICIENCY: ICD-10-CM

## 2021-06-04 DIAGNOSIS — I10 ESSENTIAL HYPERTENSION: ICD-10-CM

## 2021-06-04 LAB
ALBUMIN SERPL BCP-MCNC: 3.7 G/DL (ref 3.5–5)
ALP SERPL-CCNC: 61 U/L (ref 46–116)
ALT SERPL W P-5'-P-CCNC: 27 U/L (ref 12–78)
ANION GAP SERPL CALCULATED.3IONS-SCNC: 8 MMOL/L (ref 4–13)
AST SERPL W P-5'-P-CCNC: 24 U/L (ref 5–45)
BILIRUB SERPL-MCNC: 0.62 MG/DL (ref 0.2–1)
BUN SERPL-MCNC: 16 MG/DL (ref 5–25)
CALCIUM SERPL-MCNC: 9.5 MG/DL (ref 8.3–10.1)
CHLORIDE SERPL-SCNC: 107 MMOL/L (ref 100–108)
CO2 SERPL-SCNC: 24 MMOL/L (ref 21–32)
CREAT SERPL-MCNC: 1.17 MG/DL (ref 0.6–1.3)
GFR SERPL CREATININE-BSD FRML MDRD: 62 ML/MIN/1.73SQ M
GLUCOSE P FAST SERPL-MCNC: 101 MG/DL (ref 65–99)
POTASSIUM SERPL-SCNC: 4.4 MMOL/L (ref 3.5–5.3)
PROT SERPL-MCNC: 8.7 G/DL (ref 6.4–8.2)
SODIUM SERPL-SCNC: 139 MMOL/L (ref 136–145)
T4 FREE SERPL-MCNC: 0.88 NG/DL (ref 0.76–1.46)
TSH SERPL DL<=0.05 MIU/L-ACNC: 4.91 UIU/ML (ref 0.36–3.74)
VIT B12 SERPL-MCNC: 862 PG/ML (ref 100–900)

## 2021-06-04 PROCEDURE — 84443 ASSAY THYROID STIM HORMONE: CPT

## 2021-06-04 PROCEDURE — 82607 VITAMIN B-12: CPT

## 2021-06-04 PROCEDURE — 84439 ASSAY OF FREE THYROXINE: CPT

## 2021-06-04 PROCEDURE — 36415 COLL VENOUS BLD VENIPUNCTURE: CPT

## 2021-06-04 PROCEDURE — 80053 COMPREHEN METABOLIC PANEL: CPT

## 2021-06-10 DIAGNOSIS — G47.09 OTHER INSOMNIA: ICD-10-CM

## 2021-06-10 RX ORDER — ZOLPIDEM TARTRATE 6.25 MG/1
6.25 TABLET, FILM COATED, EXTENDED RELEASE ORAL
Qty: 30 TABLET | Refills: 0 | Status: SHIPPED | OUTPATIENT
Start: 2021-06-10 | End: 2021-07-12 | Stop reason: SDUPTHER

## 2021-07-12 DIAGNOSIS — G47.09 OTHER INSOMNIA: ICD-10-CM

## 2021-07-12 RX ORDER — ZOLPIDEM TARTRATE 6.25 MG/1
6.25 TABLET, FILM COATED, EXTENDED RELEASE ORAL
Qty: 30 TABLET | Refills: 0 | Status: SHIPPED | OUTPATIENT
Start: 2021-07-12 | End: 2021-08-12 | Stop reason: SDUPTHER

## 2021-07-14 ENCOUNTER — APPOINTMENT (OUTPATIENT)
Dept: LAB | Facility: HOSPITAL | Age: 71
End: 2021-07-14
Payer: MEDICARE

## 2021-07-14 DIAGNOSIS — I10 IDIOPATHIC HYPERTENSION: ICD-10-CM

## 2021-07-14 LAB
ALBUMIN SERPL BCP-MCNC: 3.6 G/DL (ref 3.5–5)
ALP SERPL-CCNC: 59 U/L (ref 46–116)
ALT SERPL W P-5'-P-CCNC: 32 U/L (ref 12–78)
ANION GAP SERPL CALCULATED.3IONS-SCNC: 11 MMOL/L (ref 4–13)
AST SERPL W P-5'-P-CCNC: 33 U/L (ref 5–45)
BILIRUB SERPL-MCNC: 0.63 MG/DL (ref 0.2–1)
BUN SERPL-MCNC: 12 MG/DL (ref 5–25)
CALCIUM SERPL-MCNC: 9 MG/DL (ref 8.3–10.1)
CHLORIDE SERPL-SCNC: 99 MMOL/L (ref 100–108)
CO2 SERPL-SCNC: 21 MMOL/L (ref 21–32)
CREAT SERPL-MCNC: 0.95 MG/DL (ref 0.6–1.3)
GFR SERPL CREATININE-BSD FRML MDRD: 80 ML/MIN/1.73SQ M
GLUCOSE P FAST SERPL-MCNC: 83 MG/DL (ref 65–99)
POTASSIUM SERPL-SCNC: 3.6 MMOL/L (ref 3.5–5.3)
PROT SERPL-MCNC: 7.9 G/DL (ref 6.4–8.2)
SODIUM SERPL-SCNC: 131 MMOL/L (ref 136–145)
TSH SERPL DL<=0.05 MIU/L-ACNC: 4.46 UIU/ML (ref 0.36–3.74)

## 2021-07-14 PROCEDURE — 80053 COMPREHEN METABOLIC PANEL: CPT

## 2021-07-14 PROCEDURE — 36415 COLL VENOUS BLD VENIPUNCTURE: CPT

## 2021-07-14 PROCEDURE — 84165 PROTEIN E-PHORESIS SERUM: CPT

## 2021-07-14 PROCEDURE — 84165 PROTEIN E-PHORESIS SERUM: CPT | Performed by: PATHOLOGY

## 2021-07-14 PROCEDURE — 84443 ASSAY THYROID STIM HORMONE: CPT

## 2021-07-16 ENCOUNTER — APPOINTMENT (OUTPATIENT)
Dept: LAB | Facility: HOSPITAL | Age: 71
End: 2021-07-16
Payer: MEDICARE

## 2021-07-16 DIAGNOSIS — I10 BENIGN ESSENTIAL HYPERTENSION: ICD-10-CM

## 2021-07-16 LAB
ALBUMIN SERPL ELPH-MCNC: 4.01 G/DL (ref 3.5–5)
ALBUMIN SERPL ELPH-MCNC: 52.1 % (ref 52–65)
ALPHA1 GLOB SERPL ELPH-MCNC: 0.27 G/DL (ref 0.1–0.4)
ALPHA1 GLOB SERPL ELPH-MCNC: 3.5 % (ref 2.5–5)
ALPHA2 GLOB SERPL ELPH-MCNC: 0.71 G/DL (ref 0.4–1.2)
ALPHA2 GLOB SERPL ELPH-MCNC: 9.2 % (ref 7–13)
BETA GLOB ABNORMAL SERPL ELPH-MCNC: 0.4 G/DL (ref 0.4–0.8)
BETA1 GLOB SERPL ELPH-MCNC: 5.2 % (ref 5–13)
BETA2 GLOB SERPL ELPH-MCNC: 5.4 % (ref 2–8)
BETA2+GAMMA GLOB SERPL ELPH-MCNC: 0.42 G/DL (ref 0.2–0.5)
GAMMA GLOB ABNORMAL SERPL ELPH-MCNC: 1.89 G/DL (ref 0.5–1.6)
GAMMA GLOB SERPL ELPH-MCNC: 24.6 % (ref 12–22)
IGG/ALB SER: 1.09 {RATIO} (ref 1.1–1.8)
PROT 24H UR-MCNC: 144 MG/24 HRS (ref 40–150)
PROT PATTERN SERPL ELPH-IMP: ABNORMAL
PROT SERPL-MCNC: 7.7 G/DL (ref 6.4–8.2)
SPECIMEN VOL UR: 2400 ML

## 2021-07-16 PROCEDURE — 84156 ASSAY OF PROTEIN URINE: CPT

## 2021-07-20 ENCOUNTER — OFFICE VISIT (OUTPATIENT)
Dept: FAMILY MEDICINE CLINIC | Facility: HOSPITAL | Age: 71
End: 2021-07-20
Payer: MEDICARE

## 2021-07-20 VITALS
OXYGEN SATURATION: 97 % | WEIGHT: 245 LBS | DIASTOLIC BLOOD PRESSURE: 84 MMHG | BODY MASS INDEX: 28.93 KG/M2 | HEART RATE: 72 BPM | HEIGHT: 77 IN | RESPIRATION RATE: 16 BRPM | SYSTOLIC BLOOD PRESSURE: 142 MMHG

## 2021-07-20 DIAGNOSIS — Z00.00 MEDICARE ANNUAL WELLNESS VISIT, SUBSEQUENT: Primary | ICD-10-CM

## 2021-07-20 PROCEDURE — 1123F ACP DISCUSS/DSCN MKR DOCD: CPT | Performed by: INTERNAL MEDICINE

## 2021-07-20 PROCEDURE — G0439 PPPS, SUBSEQ VISIT: HCPCS | Performed by: INTERNAL MEDICINE

## 2021-07-20 NOTE — PATIENT INSTRUCTIONS
Medicare Preventive Visit Patient Instructions  Thank you for completing your Welcome to Medicare Visit or Medicare Annual Wellness Visit today  Your next wellness visit will be due in one year (7/21/2022)  The screening/preventive services that you may require over the next 5-10 years are detailed below  Some tests may not apply to you based off risk factors and/or age  Screening tests ordered at today's visit but not completed yet may show as past due  Also, please note that scanned in results may not display below  Preventive Screenings:  Service Recommendations Previous Testing/Comments   Colorectal Cancer Screening  · Colonoscopy    · Fecal Occult Blood Test (FOBT)/Fecal Immunochemical Test (FIT)  · Fecal DNA/Cologuard Test  · Flexible Sigmoidoscopy Age: 54-65 years old   Colonoscopy: every 10 years (May be performed more frequently if at higher risk)  OR  FOBT/FIT: every 1 year  OR  Cologuard: every 3 years  OR  Sigmoidoscopy: every 5 years  Screening may be recommended earlier than age 48 if at higher risk for colorectal cancer  Also, an individualized decision between you and your healthcare provider will decide whether screening between the ages of 74-80 would be appropriate   Colonoscopy: 08/28/2015  FOBT/FIT: Not on file  Cologuard: Not on file  Sigmoidoscopy: Not on file    Screening Current     Prostate Cancer Screening Individualized decision between patient and health care provider in men between ages of 53-78   Medicare will cover every 12 months beginning on the day after your 50th birthday PSA: No results in last 5 years           Hepatitis C Screening Once for adults born between 80 and 1965  More frequently in patients at high risk for Hepatitis C Hep C Antibody: Not on file        Diabetes Screening 1-2 times per year if you're at risk for diabetes or have pre-diabetes Fasting glucose: 83 mg/dL   A1C: No results in last 5 years    Screening Current   Cholesterol Screening Once every 5 years if you don't have a lipid disorder  May order more often based on risk factors  Lipid panel: 12/12/2020    Screening Not Indicated  History Lipid Disorder      Other Preventive Screenings Covered by Medicare:  1  Abdominal Aortic Aneurysm (AAA) Screening: covered once if your at risk  You're considered to be at risk if you have a family history of AAA or a male between the age of 73-68 who smoking at least 100 cigarettes in your lifetime  2  Lung Cancer Screening: covers low dose CT scan once per year if you meet all of the following conditions: (1) Age 50-69; (2) No signs or symptoms of lung cancer; (3) Current smoker or have quit smoking within the last 15 years; (4) You have a tobacco smoking history of at least 30 pack years (packs per day x number of years you smoked); (5) You get a written order from a healthcare provider  3  Glaucoma Screening: covered annually if you're considered high risk: (1) You have diabetes OR (2) Family history of glaucoma OR (3)  aged 48 and older OR (3)  American aged 72 and older  3  Osteoporosis Screening: covered every 2 years if you meet one of the following conditions: (1) Have a vertebral abnormality; (2) On glucocorticoid therapy for more than 3 months; (3) Have primary hyperparathyroidism; (4) On osteoporosis medications and need to assess response to drug therapy  5  HIV Screening: covered annually if you're between the age of 12-76  Also covered annually if you are younger than 13 and older than 72 with risk factors for HIV infection  For pregnant patients, it is covered up to 3 times per pregnancy      Immunizations:  Immunization Recommendations   Influenza Vaccine Annual influenza vaccination during flu season is recommended for all persons aged >= 6 months who do not have contraindications   Pneumococcal Vaccine (Prevnar and Pneumovax)  * Prevnar = PCV13  * Pneumovax = PPSV23 Adults 25-60 years old: 1-3 doses may be recommended based on certain risk factors  Adults 72 years old: Prevnar (PCV13) vaccine recommended followed by Pneumovax (PPSV23) vaccine  If already received PPSV23 since turning 65, then PCV13 recommended at least one year after PPSV23 dose  Hepatitis B Vaccine 3 dose series if at intermediate or high risk (ex: diabetes, end stage renal disease, liver disease)   Tetanus (Td) Vaccine - COST NOT COVERED BY MEDICARE PART B Following completion of primary series, a booster dose should be given every 10 years to maintain immunity against tetanus  Td may also be given as tetanus wound prophylaxis  Tdap Vaccine - COST NOT COVERED BY MEDICARE PART B Recommended at least once for all adults  For pregnant patients, recommended with each pregnancy  Shingles Vaccine (Shingrix) - COST NOT COVERED BY MEDICARE PART B  2 shot series recommended in those aged 48 and above     Health Maintenance Due:      Topic Date Due    Hepatitis C Screening  Never done    Colorectal Cancer Screening  08/28/2025     Immunizations Due:      Topic Date Due    DTaP,Tdap,and Td Vaccines (1 - Tdap) Never done    Influenza Vaccine (1) 09/01/2021     Advance Directives   What are advance directives? Advance directives are legal documents that state your wishes and plans for medical care  These plans are made ahead of time in case you lose your ability to make decisions for yourself  Advance directives can apply to any medical decision, such as the treatments you want, and if you want to donate organs  What are the types of advance directives? There are many types of advance directives, and each state has rules about how to use them  You may choose a combination of any of the following:  · Living will: This is a written record of the treatment you want  You can also choose which treatments you do not want, which to limit, and which to stop at a certain time  This includes surgery, medicine, IV fluid, and tube feedings     · Durable power of  for [facility]): This is a written record that states who you want to make healthcare choices for you when you are unable to make them for yourself  This person, called a proxy, is usually a family member or a friend  You may choose more than 1 proxy  · Do not resuscitate (DNR) order:  A DNR order is used in case your heart stops beating or you stop breathing  It is a request not to have certain forms of treatment, such as CPR  A DNR order may be included in other types of advance directives  · Medical directive: This covers the care that you want if you are in a coma, near death, or unable to make decisions for yourself  You can list the treatments you want for each condition  Treatment may include pain medicine, surgery, blood transfusions, dialysis, IV or tube feedings, and a ventilator (breathing machine)  · Values history: This document has questions about your views, beliefs, and how you feel and think about life  This information can help others choose the care that you would choose  Why are advance directives important? An advance directive helps you control your care  Although spoken wishes may be used, it is better to have your wishes written down  Spoken wishes can be misunderstood, or not followed  Treatments may be given even if you do not want them  An advance directive may make it easier for your family to make difficult choices about your care  Fall Prevention    Fall prevention  includes ways to make your home and other areas safer  It also includes ways you can move more carefully to prevent a fall  Health conditions that cause changes in your blood pressure, vision, or muscle strength and coordination may increase your risk for falls  Medicines may also increase your risk for falls if they make you dizzy, weak, or sleepy  Fall prevention tips:   · Stand or sit up slowly  · Use assistive devices as directed  · Wear shoes that fit well and have soles that       · Wear a personal alarm  · Stay active  · Manage your medical conditions  Home Safety Tips:  · Add items to prevent falls in the bathroom  · Keep paths clear  · Install bright lights in your home  · Keep items you use often on shelves within reach  · Paint or place reflective tape on the edges of your stairs  Weight Management   Why it is important to manage your weight:  Being overweight increases your risk of health conditions such as heart disease, high blood pressure, type 2 diabetes, and certain types of cancer  It can also increase your risk for osteoarthritis, sleep apnea, and other respiratory problems  Aim for a slow, steady weight loss  Even a small amount of weight loss can lower your risk of health problems  How to lose weight safely:  A safe and healthy way to lose weight is to eat fewer calories and get regular exercise  You can lose up about 1 pound a week by decreasing the number of calories you eat by 500 calories each day  Healthy meal plan for weight management:  A healthy meal plan includes a variety of foods, contains fewer calories, and helps you stay healthy  A healthy meal plan includes the following:  · Eat whole-grain foods more often  A healthy meal plan should contain fiber  Fiber is the part of grains, fruits, and vegetables that is not broken down by your body  Whole-grain foods are healthy and provide extra fiber in your diet  Some examples of whole-grain foods are whole-wheat breads and pastas, oatmeal, brown rice, and bulgur  · Eat a variety of vegetables every day  Include dark, leafy greens such as spinach, kale, shaggy greens, and mustard greens  Eat yellow and orange vegetables such as carrots, sweet potatoes, and winter squash  · Eat a variety of fruits every day  Choose fresh or canned fruit (canned in its own juice or light syrup) instead of juice  Fruit juice has very little or no fiber  · Eat low-fat dairy foods    Drink fat-free (skim) milk or 1% milk  Eat fat-free yogurt and low-fat cottage cheese  Try low-fat cheeses such as mozzarella and other reduced-fat cheeses  · Choose meat and other protein foods that are low in fat  Choose beans or other legumes such as split peas or lentils  Choose fish, skinless poultry (chicken or turkey), or lean cuts of red meat (beef or pork)  Before you cook meat or poultry, cut off any visible fat  · Use less fat and oil  Try baking foods instead of frying them  Add less fat, such as margarine, sour cream, regular salad dressing and mayonnaise to foods  Eat fewer high-fat foods  Some examples of high-fat foods include french fries, doughnuts, ice cream, and cakes  · Eat fewer sweets  Limit foods and drinks that are high in sugar  This includes candy, cookies, regular soda, and sweetened drinks  Exercise:  Exercise at least 30 minutes per day on most days of the week  Some examples of exercise include walking, biking, dancing, and swimming  You can also fit in more physical activity by taking the stairs instead of the elevator or parking farther away from stores  Ask your healthcare provider about the best exercise plan for you  © Copyright [employer] 2018 Information is for End User's use only and may not be sold, redistributed or otherwise used for commercial purposes  All illustrations and images included in CareNotes® are the copyrighted property of [employer]  or [facility] Preventive Visit Patient Instructions  Thank you for completing your Welcome to Medicare Visit or Medicare Annual Wellness Visit today  Your next wellness visit will be due in one year (7/21/2022)  The screening/preventive services that you may require over the next 5-10 years are detailed below  Some tests may not apply to you based off risk factors and/or age  Screening tests ordered at today's visit but not completed yet may show as past due   Also, please note that scanned in results may not display below   Preventive Screenings:  Service Recommendations Previous Testing/Comments   Colorectal Cancer Screening  · Colonoscopy    · Fecal Occult Blood Test (FOBT)/Fecal Immunochemical Test (FIT)  · Fecal DNA/Cologuard Test  · Flexible Sigmoidoscopy Age: 54-65 years old   Colonoscopy: every 10 years (May be performed more frequently if at higher risk)  OR  FOBT/FIT: every 1 year  OR  Cologuard: every 3 years  OR  Sigmoidoscopy: every 5 years  Screening may be recommended earlier than age 48 if at higher risk for colorectal cancer  Also, an individualized decision between you and your healthcare provider will decide whether screening between the ages of 74-80 would be appropriate  Colonoscopy: 08/28/2015  FOBT/FIT: Not on file  Cologuard: Not on file  Sigmoidoscopy: Not on file    Screening Current     Prostate Cancer Screening Individualized decision between patient and health care provider in men between ages of 53-78   Medicare will cover every 12 months beginning on the day after your 50th birthday PSA: No results in last 5 years           Hepatitis C Screening Once for adults born between 80 and 1965  More frequently in patients at high risk for Hepatitis C Hep C Antibody: Not on file        Diabetes Screening 1-2 times per year if you're at risk for diabetes or have pre-diabetes Fasting glucose: 83 mg/dL   A1C: No results in last 5 years    Screening Current   Cholesterol Screening Once every 5 years if you don't have a lipid disorder  May order more often based on risk factors  Lipid panel: 12/12/2020    Screening Not Indicated  History Lipid Disorder      Other Preventive Screenings Covered by Medicare:  6  Abdominal Aortic Aneurysm (AAA) Screening: covered once if your at risk  You're considered to be at risk if you have a family history of AAA or a male between the age of 73-68 who smoking at least 100 cigarettes in your lifetime    7  Lung Cancer Screening: covers low dose CT scan once per year if you meet all of the following conditions: (1) Age 50-69; (2) No signs or symptoms of lung cancer; (3) Current smoker or have quit smoking within the last 15 years; (4) You have a tobacco smoking history of at least 30 pack years (packs per day x number of years you smoked); (5) You get a written order from a healthcare provider  8  Glaucoma Screening: covered annually if you're considered high risk: (1) You have diabetes OR (2) Family history of glaucoma OR (3)  aged 48 and older OR (3)  American aged 72 and older  5  Osteoporosis Screening: covered every 2 years if you meet one of the following conditions: (1) Have a vertebral abnormality; (2) On glucocorticoid therapy for more than 3 months; (3) Have primary hyperparathyroidism; (4) On osteoporosis medications and need to assess response to drug therapy  10  HIV Screening: covered annually if you're between the age of 12-76  Also covered annually if you are younger than 13 and older than 72 with risk factors for HIV infection  For pregnant patients, it is covered up to 3 times per pregnancy  Immunizations:  Immunization Recommendations   Influenza Vaccine Annual influenza vaccination during flu season is recommended for all persons aged >= 6 months who do not have contraindications   Pneumococcal Vaccine (Prevnar and Pneumovax)  * Prevnar = PCV13  * Pneumovax = PPSV23 Adults 25-60 years old: 1-3 doses may be recommended based on certain risk factors  Adults 72 years old: Prevnar (PCV13) vaccine recommended followed by Pneumovax (PPSV23) vaccine  If already received PPSV23 since turning 65, then PCV13 recommended at least one year after PPSV23 dose     Hepatitis B Vaccine 3 dose series if at intermediate or high risk (ex: diabetes, end stage renal disease, liver disease)   Tetanus (Td) Vaccine - COST NOT COVERED BY MEDICARE PART B Following completion of primary series, a booster dose should be given every 10 years to maintain immunity against tetanus  Td may also be given as tetanus wound prophylaxis  Tdap Vaccine - COST NOT COVERED BY MEDICARE PART B Recommended at least once for all adults  For pregnant patients, recommended with each pregnancy  Shingles Vaccine (Shingrix) - COST NOT COVERED BY MEDICARE PART B  2 shot series recommended in those aged 48 and above     Health Maintenance Due:      Topic Date Due    Hepatitis C Screening  Never done    Colorectal Cancer Screening  08/28/2025     Immunizations Due:      Topic Date Due    DTaP,Tdap,and Td Vaccines (1 - Tdap) Never done    Influenza Vaccine (1) 09/01/2021     Advance Directives   What are advance directives? Advance directives are legal documents that state your wishes and plans for medical care  These plans are made ahead of time in case you lose your ability to make decisions for yourself  Advance directives can apply to any medical decision, such as the treatments you want, and if you want to donate organs  What are the types of advance directives? There are many types of advance directives, and each state has rules about how to use them  You may choose a combination of any of the following:  · Living will: This is a written record of the treatment you want  You can also choose which treatments you do not want, which to limit, and which to stop at a certain time  This includes surgery, medicine, IV fluid, and tube feedings  · Durable power of  for healthcare [facility]): This is a written record that states who you want to make healthcare choices for you when you are unable to make them for yourself  This person, called a proxy, is usually a family member or a friend  You may choose more than 1 proxy  · Do not resuscitate (DNR) order:  A DNR order is used in case your heart stops beating or you stop breathing  It is a request not to have certain forms of treatment, such as CPR  A DNR order may be included in other types of advance directives    · Medical directive: This covers the care that you want if you are in a coma, near death, or unable to make decisions for yourself  You can list the treatments you want for each condition  Treatment may include pain medicine, surgery, blood transfusions, dialysis, IV or tube feedings, and a ventilator (breathing machine)  · Values history: This document has questions about your views, beliefs, and how you feel and think about life  This information can help others choose the care that you would choose  Why are advance directives important? An advance directive helps you control your care  Although spoken wishes may be used, it is better to have your wishes written down  Spoken wishes can be misunderstood, or not followed  Treatments may be given even if you do not want them  An advance directive may make it easier for your family to make difficult choices about your care  Fall Prevention    Fall prevention  includes ways to make your home and other areas safer  It also includes ways you can move more carefully to prevent a fall  Health conditions that cause changes in your blood pressure, vision, or muscle strength and coordination may increase your risk for falls  Medicines may also increase your risk for falls if they make you dizzy, weak, or sleepy  Fall prevention tips:   · Stand or sit up slowly  · Use assistive devices as directed  · Wear shoes that fit well and have soles that   · Wear a personal alarm  · Stay active  · Manage your medical conditions  Home Safety Tips:  · Add items to prevent falls in the bathroom  · Keep paths clear  · Install bright lights in your home  · Keep items you use often on shelves within reach  · Paint or place reflective tape on the edges of your stairs      Weight Management   Why it is important to manage your weight:  Being overweight increases your risk of health conditions such as heart disease, high blood pressure, type 2 diabetes, and certain types of cancer  It can also increase your risk for osteoarthritis, sleep apnea, and other respiratory problems  Aim for a slow, steady weight loss  Even a small amount of weight loss can lower your risk of health problems  How to lose weight safely:  A safe and healthy way to lose weight is to eat fewer calories and get regular exercise  You can lose up about 1 pound a week by decreasing the number of calories you eat by 500 calories each day  Healthy meal plan for weight management:  A healthy meal plan includes a variety of foods, contains fewer calories, and helps you stay healthy  A healthy meal plan includes the following:  · Eat whole-grain foods more often  A healthy meal plan should contain fiber  Fiber is the part of grains, fruits, and vegetables that is not broken down by your body  Whole-grain foods are healthy and provide extra fiber in your diet  Some examples of whole-grain foods are whole-wheat breads and pastas, oatmeal, brown rice, and bulgur  · Eat a variety of vegetables every day  Include dark, leafy greens such as spinach, kale, shaggy greens, and mustard greens  Eat yellow and orange vegetables such as carrots, sweet potatoes, and winter squash  · Eat a variety of fruits every day  Choose fresh or canned fruit (canned in its own juice or light syrup) instead of juice  Fruit juice has very little or no fiber  · Eat low-fat dairy foods  Drink fat-free (skim) milk or 1% milk  Eat fat-free yogurt and low-fat cottage cheese  Try low-fat cheeses such as mozzarella and other reduced-fat cheeses  · Choose meat and other protein foods that are low in fat  Choose beans or other legumes such as split peas or lentils  Choose fish, skinless poultry (chicken or turkey), or lean cuts of red meat (beef or pork)  Before you cook meat or poultry, cut off any visible fat  · Use less fat and oil  Try baking foods instead of frying them   Add less fat, such as margarine, sour cream, regular salad dressing and mayonnaise to foods  Eat fewer high-fat foods  Some examples of high-fat foods include french fries, doughnuts, ice cream, and cakes  · Eat fewer sweets  Limit foods and drinks that are high in sugar  This includes candy, cookies, regular soda, and sweetened drinks  Exercise:  Exercise at least 30 minutes per day on most days of the week  Some examples of exercise include walking, biking, dancing, and swimming  You can also fit in more physical activity by taking the stairs instead of the elevator or parking farther away from stores  Ask your healthcare provider about the best exercise plan for you  © Copyright [address] 2018 Information is for End User's use only and may not be sold, redistributed or otherwise used for commercial purposes   All illustrations and images included in CareNotes® are the copyrighted property of [employer]  or [address] [place] [address]

## 2021-07-20 NOTE — PROGRESS NOTES
Assessment and Plan:     Problem List Items Addressed This Visit     None      Visit Diagnoses     Medicare annual wellness visit, subsequent    -  Primary          Depression Screening and Follow-up Plan: Patient's depression screening was positive with a PHQ-2 score of 1  Patient assessed for underlying major depression  Brief counseling provided and recommend additional follow-up/re-evaluation next office visit  Falls Plan of Care: balance, strength, and gait training instructions were provided  I recommended physical therapy for gait and balance training  Patient declined today  He has seen physical therapist 4 times a claims      Preventive health issues were discussed with patient, and age appropriate screening tests were ordered as noted in patient's After Visit Summary  Personalized health advice and appropriate referrals for health education or preventive services given if needed, as noted in patient's After Visit Summary  History of Present Illness:     Patient presents for Welcome to Medicare visit  Patient Care Team:  Dorinda Mena MD as PCP - General (Internal Medicine)     Review of Systems:     Review of Systems   Constitutional: Negative for fever and unexpected weight change  HENT: Negative for hearing loss  Eyes: Negative for visual disturbance  Respiratory: Positive for shortness of breath (Mild shortness of breath after walking up to 2nd floor in his house)  Negative for cough  Cardiovascular: Negative for chest pain, palpitations and leg swelling  Gastrointestinal: Positive for constipation ( uses [employer], he has chronic constipation)  Negative for abdominal pain, blood in stool and diarrhea  Genitourinary: Negative for difficulty urinating and hematuria  Musculoskeletal: Positive for gait problem  Negative for back pain  Neurological: Negative for weakness and headaches  Psychiatric/Behavioral: Negative for dysphoric mood  The patient is not nervous/anxious  Problem List:     Patient Active Problem List   Diagnosis    Other insomnia    Hypercholesteremia    Gastroesophageal reflux disease without esophagitis    Essential hypertension    S/P CABG (coronary artery bypass graft)    Alcohol use disorder, mild, in controlled environment    Pleural effusion    Coronary artery disease involving native coronary artery of native heart without angina pectoris    Paroxysmal atrial fibrillation ([address] )    Rosacea    Other male erectile dysfunction    Nonrheumatic aortic valve insufficiency    Ambulatory dysfunction    B12 deficiency    Chronic rhinitis      Past Medical and Surgical History:     Past Medical History:   Diagnosis Date    Sparks esophagus     Coronary artery disease      Past Surgical History:   Procedure Laterality Date    APPENDECTOMY      CARDIAC SURGERY  2014    CABG    IR THORACENTESIS  4/29/2020    LIPOMA RESECTION      SPINE SURGERY  06/18/2019    Upper cervical fusion      Family History:     Family History   Problem Relation Age of Onset    Diabetes Brother     Heart disease Mother     COPD Neg Hx     Asthma Neg Hx     Substance Abuse Neg Hx     Mental illness Neg Hx       Social History:     Social History     Socioeconomic History    Marital status: /Civil Union     Spouse name: None    Number of children: None    Years of education: None    Highest education level: None   Occupational History    Occupation: retired teacher   Tobacco Use    Smoking status: Never Smoker    Smokeless tobacco: Never Used   Vaping Use    Vaping Use: Never used   Substance and Sexual Activity    Alcohol use: Yes     Alcohol/week: 3 0 standard drinks     Types: 3 Shots of liquor per week     Comment: , pts drinking is down, maybe 3 shots, but not everyday      Drug use: Never    Sexual activity: None   Other Topics Concern    None   Social History Narrative    Wears seatbelt    Lives with Wife    Living will     Sees dentist reg Feels safe at home     Social Determinants of Health     Financial Resource Strain: Low Risk     Difficulty of Paying Living Expenses: Not hard at all   Food Insecurity: No Food Insecurity    Worried About Running Out of Food in the Last Year: Never true    Tai of Food in the Last Year: Never true   Transportation Needs: No Transportation Needs    Lack of Transportation (Medical): No    Lack of Transportation (Non-Medical):  No   Physical Activity: Inactive    Days of Exercise per Week: 0 days    Minutes of Exercise per Session: 0 min   Stress: No Stress Concern Present    Feeling of Stress : Not at all   Social Connections: Socially Isolated    Frequency of Communication with Friends and Family: Twice a week    Frequency of Social Gatherings with Friends and Family: Never    Attends [employer] Services: Never    Active Member of Clubs or Organizations: No    Attends Club or Organization Meetings: Never    Marital Status:    Intimate Partner Violence: Not At Risk    Fear of Current or Ex-Partner: No    Emotionally Abused: No    Physically Abused: No    Sexually Abused: No      Medications and Allergies:     Current Outpatient Medications   Medication Sig Dispense Refill    aspirin (ECOTRIN LOW STRENGTH) 81 mg EC tablet Take 81 mg by mouth daily      folic acid (FOLVITE) 1 mg tablet every 24 hours      furosemide (LASIX) 20 mg tablet Take 2 tablets (40 mg total) by mouth daily 180 tablet 0    ipratropium (ATROVENT) 0 06 % nasal spray 2 sprays into each nostril 2 (two) times a day 15 mL 3    metoprolol succinate (TOPROL-XL) 50 mg 24 hr tablet Take 1 tablet (50 mg total) by mouth 2 (two) times a day 60 tablet 5    pantoprazole (PROTONIX) 40 mg tablet Take 1 tablet (40 mg total) by mouth daily 30 tablet 5    Polyethylene Glycol 3350 (MIRALAX PO) every 24 hours      potassium chloride (K-DUR) 10 mEq tablet 1 every other day  3    pravastatin (PRAVACHOL) 20 mg tablet TAKE 1 TABLET BY MOUTH EVERY DAY 90 tablet 1    sildenafil (REVATIO) 20 mg tablet Take 3 tablets (60 mg total) by mouth as needed (sexual activity) 30 tablet 1    thiamine (VITAMIN B1) 100 mg tablet Take 1 tablet (100 mg total) by mouth every 24 hours 30 tablet 5    vitamin B-12 (CYANOCOBALAMIN) 2000 MCG TABS Take 1 tablet (2,000 mcg total) by mouth daily 30 tablet 5    zolpidem (AMBIEN CR) 6 25 MG CR tablet Take 1 tablet (6 25 mg total) by mouth daily at bedtime as needed for sleep 30 tablet 0     No current facility-administered medications for this visit  Allergies   Allergen Reactions    Ampicillin Lip Swelling     Other reaction(s): Unknown, Unknown Reaction      Immunizations:     Immunization History   Administered Date(s) Administered    Fluzone Split Quad 0 5 mL 09/18/2014, 12/19/2016    H1N1, All Formulations 11/24/2009    INFLUENZA 09/18/2014, 10/08/2015, 12/19/2016, 11/23/2018    Influenza Split High Dose Preservative Free IM 09/28/2017, 11/23/2018, 10/01/2019    Influenza, seasonal, injectable 11/17/2011    Influenza, seasonal, injectable, preservative free 10/10/2013, 10/07/2020    Pneumococcal Conjugate 13-Valent 06/12/2015    Pneumococcal Polysaccharide PPV23 04/07/2014    SARS-CoV-2 / COVID-19 mRNA IM (Pfizer-BioNTech) 03/08/2021, 04/01/2021    Zoster 01/08/2010      Health Maintenance:         Topic Date Due    Hepatitis C Screening  Never done    Colorectal Cancer Screening  08/28/2025         Topic Date Due    DTaP,Tdap,and Td Vaccines (1 - Tdap) Never done    Influenza Vaccine (1) 09/01/2021      Medicare Screening Tests and Risk Assessments:     José Miguel Ibrahim is here for his Subsequent Wellness visit  Health Risk Assessment:   Patient rates overall health as fair  Patient feels that their physical health rating is same  Patient is satisfied with their life  Eyesight was rated as same  Hearing was rated as same  Patient feels that their emotional and mental health rating is same   Patients states they are never, rarely angry  Patient states they are sometimes unusually tired/fatigued  Pain experienced in the last 7 days has been none  Depression Screening:   PHQ-2 Score: 1      Fall Risk Screening: In the past year, patient has experienced: history of falling in past year    Number of falls: 1  Injured during fall?: No    Feels unsteady when standing or walking?: Yes    Worried about falling?: No      Home Safety:  Patient does not have trouble with stairs inside or outside of their home  Patient has working smoke alarms and has working carbon monoxide detector  Home safety hazards include: none  Nutrition:   Pt eats what he wants  States that he only eats 2 meals a day  Medications:   Patient is currently taking over-the-counter supplements  OTC medications include: see medication list  Patient is able to manage medications  Activities of Daily Living (ADLs)/Instrumental Activities of Daily Living (IADLs):   Walk and transfer into and out of bed and chair?: Yes  Dress and groom yourself?: Yes    Bathe or shower yourself?: Yes    Feed yourself? Yes  Do your laundry/housekeeping?: Yes  Manage your money, pay your bills and track your expenses?: Yes  Make your own meals?: Yes    Do your own shopping?: Yes    Previous Hospitalizations:   Any hospitalizations or ED visits within the last 12 months?: No      Advance Care Planning:   Living will: Yes    Durable POA for healthcare:  Yes    Advanced directive: Yes    Advanced directive counseling given: Yes    End of Life Decisions reviewed with patient: Yes      Comments: Full code but level 3 DNR if he is in a coma or brain death    Cognitive Screening:   Provider or family/friend/caregiver concerned regarding cognition?: No    PREVENTIVE SCREENINGS      Cardiovascular Screening:    General: Screening Not Indicated and History Lipid Disorder      Diabetes Screening:     General: Screening Current and Risks and Benefits Discussed Colorectal Cancer Screening:     General: Screening Current      Prostate Cancer Screening:    General: Risks and Benefits Discussed and Screening Not Indicated      Abdominal Aortic Aneurysm (AAA) Screening:    Risk factors include: age between 73-69 yo        Lung Cancer Screening:     General: Screening Not Indicated    Screening, Brief Intervention, and Referral to Treatment (SBIRT)    Screening  Typical number of drinks in a day: 4  Typical number of drinks in a week: 20  Interpretation: Risky drinking behavior  Single Item Drug Screening:  How often have you used an illegal drug (including marijuana) or a prescription medication for non-medical reasons in the past year? never    Single Item Drug Screen Score: 0  Interpretation: Negative screen for possible drug use disorder    Brief Intervention  Healthy alcohol use/limits discussed  Health risks of current substance use discussed  Patient is trying to decrease alcohol consumption on his own, he feels that he is doing much better compared to previous years  He has not ready to abstain from alcohol yet  Other Counseling Topics:   I offered referral to physical therapy for gait and balance training the patient declined today  He does not feel depressed    No exam data present     Physical Exam:     /84   Pulse 72   Resp 16   Ht 6' 5" (1 956 m)   Wt 111 kg (245 lb)   SpO2 97%   BMI 29 05 kg/m²     Physical Exam  HENT:      Head: Normocephalic  Eyes:      Conjunctiva/sclera: Conjunctivae normal    Cardiovascular:      Rate and Rhythm: Normal rate and regular rhythm  Heart sounds: Murmur (Soft diastolic murmur was heard at the left sternal border) heard  Pulmonary:      Effort: No respiratory distress  Breath sounds: No wheezing or rales  Abdominal:      General: Bowel sounds are normal       Palpations: Abdomen is soft  Tenderness: There is no abdominal tenderness     Musculoskeletal:         General: Swelling ( bony hypertrophy the knees are present) present  Cervical back: Neck supple  Skin:     General: Skin is warm and dry  Comments: Patient has lower extremity varicose veins without ulcers or cellulitis   Neurological:      Mental Status: He is alert and oriented to person, place, and time  Cranial Nerves: No cranial nerve deficit  Motor: No weakness     Psychiatric:         Mood and Affect: Mood normal           Ana Peterson MD

## 2021-07-23 ENCOUNTER — LAB (OUTPATIENT)
Dept: LAB | Facility: HOSPITAL | Age: 71
End: 2021-07-23
Payer: MEDICARE

## 2021-07-23 DIAGNOSIS — E87.1 HYPONATREMIA: ICD-10-CM

## 2021-07-23 LAB
ANION GAP SERPL CALCULATED.3IONS-SCNC: 9 MMOL/L (ref 4–13)
BUN SERPL-MCNC: 10 MG/DL (ref 5–25)
CALCIUM SERPL-MCNC: 9.1 MG/DL (ref 8.3–10.1)
CHLORIDE SERPL-SCNC: 102 MMOL/L (ref 100–108)
CO2 SERPL-SCNC: 19 MMOL/L (ref 21–32)
CREAT SERPL-MCNC: 1.02 MG/DL (ref 0.6–1.3)
GFR SERPL CREATININE-BSD FRML MDRD: 73 ML/MIN/1.73SQ M
GLUCOSE P FAST SERPL-MCNC: 83 MG/DL (ref 65–99)
POTASSIUM SERPL-SCNC: 4.3 MMOL/L (ref 3.5–5.3)
SODIUM SERPL-SCNC: 130 MMOL/L (ref 136–145)

## 2021-07-23 PROCEDURE — 36415 COLL VENOUS BLD VENIPUNCTURE: CPT

## 2021-07-23 PROCEDURE — 80048 BASIC METABOLIC PNL TOTAL CA: CPT

## 2021-08-12 DIAGNOSIS — G47.09 OTHER INSOMNIA: ICD-10-CM

## 2021-08-12 DIAGNOSIS — I10 ESSENTIAL HYPERTENSION: ICD-10-CM

## 2021-08-12 RX ORDER — ZOLPIDEM TARTRATE 6.25 MG/1
6.25 TABLET, FILM COATED, EXTENDED RELEASE ORAL
Qty: 30 TABLET | Refills: 0 | Status: SHIPPED | OUTPATIENT
Start: 2021-08-12 | End: 2021-09-15 | Stop reason: SDUPTHER

## 2021-08-12 RX ORDER — METOPROLOL SUCCINATE 50 MG/1
50 TABLET, EXTENDED RELEASE ORAL 2 TIMES DAILY
Qty: 60 TABLET | Refills: 5 | Status: SHIPPED | OUTPATIENT
Start: 2021-08-12 | End: 2022-02-08

## 2021-08-28 ENCOUNTER — ANESTHESIA EVENT (INPATIENT)
Dept: PERIOP | Facility: HOSPITAL | Age: 71
DRG: 481 | End: 2021-08-28
Payer: MEDICARE

## 2021-08-28 ENCOUNTER — APPOINTMENT (EMERGENCY)
Dept: RADIOLOGY | Facility: HOSPITAL | Age: 71
DRG: 481 | End: 2021-08-28
Payer: MEDICARE

## 2021-08-28 ENCOUNTER — ANESTHESIA (INPATIENT)
Dept: PERIOP | Facility: HOSPITAL | Age: 71
DRG: 481 | End: 2021-08-28
Payer: MEDICARE

## 2021-08-28 ENCOUNTER — APPOINTMENT (EMERGENCY)
Dept: CT IMAGING | Facility: HOSPITAL | Age: 71
DRG: 481 | End: 2021-08-28
Payer: MEDICARE

## 2021-08-28 ENCOUNTER — APPOINTMENT (INPATIENT)
Dept: RADIOLOGY | Facility: HOSPITAL | Age: 71
DRG: 481 | End: 2021-08-28
Payer: MEDICARE

## 2021-08-28 ENCOUNTER — HOSPITAL ENCOUNTER (INPATIENT)
Facility: HOSPITAL | Age: 71
LOS: 3 days | DRG: 481 | End: 2021-08-31
Attending: EMERGENCY MEDICINE | Admitting: INTERNAL MEDICINE
Payer: MEDICARE

## 2021-08-28 DIAGNOSIS — R33.9 URINARY RETENTION: ICD-10-CM
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
## 2021-08-28 DIAGNOSIS — S72.001A CLOSED FRACTURE OF RIGHT HIP, INITIAL ENCOUNTER (HCC): Primary | ICD-10-CM

## 2021-08-28 DIAGNOSIS — S72.001A CLOSED RIGHT HIP FRACTURE, INITIAL ENCOUNTER (HCC): ICD-10-CM

## 2021-08-28 DIAGNOSIS — F10.929 ALCOHOL INTOXICATION (HCC): ICD-10-CM

## 2021-08-28 PROBLEM — F10.10 ALCOHOL ABUSE: Status: ACTIVE | Noted: 2021-08-28

## 2021-08-28 LAB
ABO GROUP BLD: NORMAL
ABO GROUP BLD: NORMAL
ALBUMIN SERPL BCP-MCNC: 3.4 G/DL (ref 3.5–5)
ALP SERPL-CCNC: 58 U/L (ref 46–116)
ALT SERPL W P-5'-P-CCNC: 31 U/L (ref 12–78)
ANION GAP SERPL CALCULATED.3IONS-SCNC: 10 MMOL/L (ref 4–13)
APTT PPP: 34 SECONDS (ref 23–37)
AST SERPL W P-5'-P-CCNC: 33 U/L (ref 5–45)
BASOPHILS # BLD AUTO: 0.05 THOUSANDS/ΜL (ref 0–0.1)
BASOPHILS NFR BLD AUTO: 1 % (ref 0–1)
BILIRUB DIRECT SERPL-MCNC: 0.28 MG/DL (ref 0–0.2)
BILIRUB SERPL-MCNC: 0.6 MG/DL (ref 0.2–1)
BLD GP AB SCN SERPL QL: NEGATIVE
BUN SERPL-MCNC: 14 MG/DL (ref 5–25)
CALCIUM SERPL-MCNC: 8.3 MG/DL (ref 8.3–10.1)
CHLORIDE SERPL-SCNC: 98 MMOL/L (ref 100–108)
CK SERPL-CCNC: 93 U/L (ref 39–308)
CO2 SERPL-SCNC: 24 MMOL/L (ref 21–32)
CREAT SERPL-MCNC: 1.1 MG/DL (ref 0.6–1.3)
EOSINOPHIL # BLD AUTO: 0.07 THOUSAND/ΜL (ref 0–0.61)
EOSINOPHIL NFR BLD AUTO: 1 % (ref 0–6)
ERYTHROCYTE [DISTWIDTH] IN BLOOD BY AUTOMATED COUNT: 13.2 % (ref 11.6–15.1)
ETHANOL SERPL-MCNC: 235 MG/DL (ref 0–3)
GFR SERPL CREATININE-BSD FRML MDRD: 66 ML/MIN/1.73SQ M
GLUCOSE SERPL-MCNC: 83 MG/DL (ref 65–140)
HCT VFR BLD AUTO: 41.4 % (ref 36.5–49.3)
HGB BLD-MCNC: 13.8 G/DL (ref 12–17)
IMM GRANULOCYTES # BLD AUTO: 0.12 THOUSAND/UL (ref 0–0.2)
IMM GRANULOCYTES NFR BLD AUTO: 1 % (ref 0–2)
INR PPP: 1.14 (ref 0.84–1.19)
LYMPHOCYTES # BLD AUTO: 1.34 THOUSANDS/ΜL (ref 0.6–4.47)
LYMPHOCYTES NFR BLD AUTO: 13 % (ref 14–44)
MCH RBC QN AUTO: 30.9 PG (ref 26.8–34.3)
MCHC RBC AUTO-ENTMCNC: 33.3 G/DL (ref 31.4–37.4)
MCV RBC AUTO: 93 FL (ref 82–98)
MONOCYTES # BLD AUTO: 1.08 THOUSAND/ΜL (ref 0.17–1.22)
MONOCYTES NFR BLD AUTO: 10 % (ref 4–12)
NEUTROPHILS # BLD AUTO: 7.97 THOUSANDS/ΜL (ref 1.85–7.62)
NEUTS SEG NFR BLD AUTO: 74 % (ref 43–75)
NRBC BLD AUTO-RTO: 0 /100 WBCS
PLATELET # BLD AUTO: 249 THOUSANDS/UL (ref 149–390)
PMV BLD AUTO: 9.8 FL (ref 8.9–12.7)
POTASSIUM SERPL-SCNC: 4.5 MMOL/L (ref 3.5–5.3)
PROT SERPL-MCNC: 7.5 G/DL (ref 6.4–8.2)
PROTHROMBIN TIME: 14.6 SECONDS (ref 11.6–14.5)
RBC # BLD AUTO: 4.46 MILLION/UL (ref 3.88–5.62)
RH BLD: POSITIVE
RH BLD: POSITIVE
SODIUM SERPL-SCNC: 132 MMOL/L (ref 136–145)
SPECIMEN EXPIRATION DATE: NORMAL
TROPONIN I SERPL-MCNC: <0.02 NG/ML
WBC # BLD AUTO: 10.63 THOUSAND/UL (ref 4.31–10.16)

## 2021-08-28 PROCEDURE — 82077 ASSAY SPEC XCP UR&BREATH IA: CPT | Performed by: EMERGENCY MEDICINE

## 2021-08-28 PROCEDURE — 86900 BLOOD TYPING SEROLOGIC ABO: CPT | Performed by: EMERGENCY MEDICINE

## 2021-08-28 PROCEDURE — 80076 HEPATIC FUNCTION PANEL: CPT | Performed by: EMERGENCY MEDICINE

## 2021-08-28 PROCEDURE — 84484 ASSAY OF TROPONIN QUANT: CPT | Performed by: EMERGENCY MEDICINE

## 2021-08-28 PROCEDURE — 0QS636Z REPOSITION RIGHT UPPER FEMUR WITH INTRAMEDULLARY INTERNAL FIXATION DEVICE, PERCUTANEOUS APPROACH: ICD-10-PCS | Performed by: ORTHOPAEDIC SURGERY

## 2021-08-28 PROCEDURE — 99222 1ST HOSP IP/OBS MODERATE 55: CPT | Performed by: INTERNAL MEDICINE

## 2021-08-28 PROCEDURE — C1713 ANCHOR/SCREW BN/BN,TIS/BN: HCPCS | Performed by: ORTHOPAEDIC SURGERY

## 2021-08-28 PROCEDURE — 93005 ELECTROCARDIOGRAM TRACING: CPT

## 2021-08-28 PROCEDURE — 86850 RBC ANTIBODY SCREEN: CPT | Performed by: EMERGENCY MEDICINE

## 2021-08-28 PROCEDURE — 73552 X-RAY EXAM OF FEMUR 2/>: CPT

## 2021-08-28 PROCEDURE — 99285 EMERGENCY DEPT VISIT HI MDM: CPT

## 2021-08-28 PROCEDURE — 96374 THER/PROPH/DIAG INJ IV PUSH: CPT

## 2021-08-28 PROCEDURE — 72170 X-RAY EXAM OF PELVIS: CPT

## 2021-08-28 PROCEDURE — C1769 GUIDE WIRE: HCPCS | Performed by: ORTHOPAEDIC SURGERY

## 2021-08-28 PROCEDURE — 36415 COLL VENOUS BLD VENIPUNCTURE: CPT | Performed by: EMERGENCY MEDICINE

## 2021-08-28 PROCEDURE — 85025 COMPLETE CBC W/AUTO DIFF WBC: CPT | Performed by: EMERGENCY MEDICINE

## 2021-08-28 PROCEDURE — 85610 PROTHROMBIN TIME: CPT | Performed by: EMERGENCY MEDICINE

## 2021-08-28 PROCEDURE — 90471 IMMUNIZATION ADMIN: CPT

## 2021-08-28 PROCEDURE — 70450 CT HEAD/BRAIN W/O DYE: CPT

## 2021-08-28 PROCEDURE — 80048 BASIC METABOLIC PNL TOTAL CA: CPT | Performed by: EMERGENCY MEDICINE

## 2021-08-28 PROCEDURE — G9197 ORDER FOR CEPH: HCPCS | Performed by: ORTHOPAEDIC SURGERY

## 2021-08-28 PROCEDURE — 85730 THROMBOPLASTIN TIME PARTIAL: CPT | Performed by: EMERGENCY MEDICINE

## 2021-08-28 PROCEDURE — 72125 CT NECK SPINE W/O DYE: CPT

## 2021-08-28 PROCEDURE — 82550 ASSAY OF CK (CPK): CPT | Performed by: EMERGENCY MEDICINE

## 2021-08-28 PROCEDURE — 90715 TDAP VACCINE 7 YRS/> IM: CPT | Performed by: EMERGENCY MEDICINE

## 2021-08-28 PROCEDURE — 86901 BLOOD TYPING SEROLOGIC RH(D): CPT | Performed by: EMERGENCY MEDICINE

## 2021-08-28 PROCEDURE — 71045 X-RAY EXAM CHEST 1 VIEW: CPT

## 2021-08-28 PROCEDURE — 71260 CT THORAX DX C+: CPT

## 2021-08-28 PROCEDURE — 99223 1ST HOSP IP/OBS HIGH 75: CPT | Performed by: ORTHOPAEDIC SURGERY

## 2021-08-28 PROCEDURE — 27245 TREAT THIGH FRACTURE: CPT | Performed by: ORTHOPAEDIC SURGERY

## 2021-08-28 PROCEDURE — 73502 X-RAY EXAM HIP UNI 2-3 VIEWS: CPT

## 2021-08-28 PROCEDURE — 99285 EMERGENCY DEPT VISIT HI MDM: CPT | Performed by: EMERGENCY MEDICINE

## 2021-08-28 PROCEDURE — 74177 CT ABD & PELVIS W/CONTRAST: CPT

## 2021-08-28 DEVICE — 12MM/130 DEG TI CANN TFNA 235MM/RIGHT - STERILE
Type: IMPLANTABLE DEVICE | Site: FEMUR | Status: FUNCTIONAL
Brand: TFN-ADVANCE

## 2021-08-28 DEVICE — TFNA FENESTRATED SCREW 115MM - STERILE
Type: IMPLANTABLE DEVICE | Site: FEMUR | Status: FUNCTIONAL
Brand: TFN-ADVANCE

## 2021-08-28 RX ORDER — ZOLPIDEM TARTRATE 5 MG/1
5 TABLET ORAL
Status: DISCONTINUED | OUTPATIENT
Start: 2021-08-28 | End: 2021-08-31 | Stop reason: HOSPADM

## 2021-08-28 RX ORDER — FENTANYL CITRATE/PF 50 MCG/ML
25 SYRINGE (ML) INJECTION
Status: DISCONTINUED | OUTPATIENT
Start: 2021-08-28 | End: 2021-08-28

## 2021-08-28 RX ORDER — ALBUMIN, HUMAN INJ 5% 5 %
SOLUTION INTRAVENOUS CONTINUOUS PRN
Status: DISCONTINUED | OUTPATIENT
Start: 2021-08-28 | End: 2021-08-28

## 2021-08-28 RX ORDER — HYDROMORPHONE HCL/PF 1 MG/ML
0.4 SYRINGE (ML) INJECTION
Status: CANCELLED | OUTPATIENT
Start: 2021-08-28

## 2021-08-28 RX ORDER — OXYCODONE HYDROCHLORIDE 5 MG/1
5 TABLET ORAL EVERY 4 HOURS PRN
Status: DISCONTINUED | OUTPATIENT
Start: 2021-08-28 | End: 2021-08-30

## 2021-08-28 RX ORDER — FUROSEMIDE 40 MG/1
40 TABLET ORAL DAILY
Status: DISCONTINUED | OUTPATIENT
Start: 2021-08-29 | End: 2021-08-31 | Stop reason: HOSPADM

## 2021-08-28 RX ORDER — ASPIRIN 81 MG/1
81 TABLET ORAL DAILY
Status: DISCONTINUED | OUTPATIENT
Start: 2021-08-28 | End: 2021-08-31 | Stop reason: HOSPADM

## 2021-08-28 RX ORDER — PROPOFOL 10 MG/ML
INJECTION, EMULSION INTRAVENOUS AS NEEDED
Status: DISCONTINUED | OUTPATIENT
Start: 2021-08-28 | End: 2021-08-28

## 2021-08-28 RX ORDER — FENTANYL CITRATE 50 UG/ML
INJECTION, SOLUTION INTRAMUSCULAR; INTRAVENOUS AS NEEDED
Status: DISCONTINUED | OUTPATIENT
Start: 2021-08-28 | End: 2021-08-28

## 2021-08-28 RX ORDER — FOLIC ACID 1 MG/1
1 TABLET ORAL ONCE
Status: DISCONTINUED | OUTPATIENT
Start: 2021-08-28 | End: 2021-08-28

## 2021-08-28 RX ORDER — NICOTINE 21 MG/24HR
1 PATCH, TRANSDERMAL 24 HOURS TRANSDERMAL DAILY
Status: DISCONTINUED | OUTPATIENT
Start: 2021-08-28 | End: 2021-08-29

## 2021-08-28 RX ORDER — ACETAMINOPHEN 325 MG/1
650 TABLET ORAL EVERY 6 HOURS PRN
Status: DISCONTINUED | OUTPATIENT
Start: 2021-08-28 | End: 2021-08-31 | Stop reason: HOSPADM

## 2021-08-28 RX ORDER — OXYCODONE HYDROCHLORIDE 5 MG/1
2.5 TABLET ORAL EVERY 4 HOURS PRN
Status: DISCONTINUED | OUTPATIENT
Start: 2021-08-28 | End: 2021-08-30

## 2021-08-28 RX ORDER — FOLIC ACID 1 MG/1
1 TABLET ORAL DAILY
Status: DISCONTINUED | OUTPATIENT
Start: 2021-08-29 | End: 2021-08-31 | Stop reason: HOSPADM

## 2021-08-28 RX ORDER — LIDOCAINE HYDROCHLORIDE 10 MG/ML
INJECTION, SOLUTION EPIDURAL; INFILTRATION; INTRACAUDAL; PERINEURAL AS NEEDED
Status: DISCONTINUED | OUTPATIENT
Start: 2021-08-28 | End: 2021-08-28

## 2021-08-28 RX ORDER — HYDROMORPHONE HCL/PF 1 MG/ML
0.5 SYRINGE (ML) INJECTION
Status: DISCONTINUED | OUTPATIENT
Start: 2021-08-28 | End: 2021-08-28

## 2021-08-28 RX ORDER — SODIUM CHLORIDE, SODIUM LACTATE, POTASSIUM CHLORIDE, CALCIUM CHLORIDE 600; 310; 30; 20 MG/100ML; MG/100ML; MG/100ML; MG/100ML
INJECTION, SOLUTION INTRAVENOUS CONTINUOUS PRN
Status: DISCONTINUED | OUTPATIENT
Start: 2021-08-28 | End: 2021-08-28

## 2021-08-28 RX ORDER — NEOSTIGMINE METHYLSULFATE 1 MG/ML
INJECTION INTRAVENOUS AS NEEDED
Status: DISCONTINUED | OUTPATIENT
Start: 2021-08-28 | End: 2021-08-28

## 2021-08-28 RX ORDER — METOPROLOL SUCCINATE 50 MG/1
50 TABLET, EXTENDED RELEASE ORAL 2 TIMES DAILY
Status: DISCONTINUED | OUTPATIENT
Start: 2021-08-28 | End: 2021-08-31 | Stop reason: HOSPADM

## 2021-08-28 RX ORDER — LANOLIN ALCOHOL/MO/W.PET/CERES
100 CREAM (GRAM) TOPICAL DAILY
Status: DISCONTINUED | OUTPATIENT
Start: 2021-08-29 | End: 2021-08-31 | Stop reason: HOSPADM

## 2021-08-28 RX ORDER — PANTOPRAZOLE SODIUM 40 MG/1
40 TABLET, DELAYED RELEASE ORAL
Status: DISCONTINUED | OUTPATIENT
Start: 2021-08-29 | End: 2021-08-31 | Stop reason: HOSPADM

## 2021-08-28 RX ORDER — CEFAZOLIN SODIUM 2 G/50ML
2000 SOLUTION INTRAVENOUS ONCE
Status: COMPLETED | OUTPATIENT
Start: 2021-08-28 | End: 2021-08-28

## 2021-08-28 RX ORDER — LANOLIN ALCOHOL/MO/W.PET/CERES
100 CREAM (GRAM) TOPICAL ONCE
Status: DISCONTINUED | OUTPATIENT
Start: 2021-08-28 | End: 2021-08-28

## 2021-08-28 RX ORDER — PRAVASTATIN SODIUM 20 MG
20 TABLET ORAL DAILY
Status: DISCONTINUED | OUTPATIENT
Start: 2021-08-28 | End: 2021-08-31 | Stop reason: HOSPADM

## 2021-08-28 RX ORDER — SODIUM CHLORIDE, SODIUM LACTATE, POTASSIUM CHLORIDE, CALCIUM CHLORIDE 600; 310; 30; 20 MG/100ML; MG/100ML; MG/100ML; MG/100ML
100 INJECTION, SOLUTION INTRAVENOUS CONTINUOUS
Status: CANCELLED | OUTPATIENT
Start: 2021-08-28

## 2021-08-28 RX ORDER — ROCURONIUM BROMIDE 10 MG/ML
INJECTION, SOLUTION INTRAVENOUS AS NEEDED
Status: DISCONTINUED | OUTPATIENT
Start: 2021-08-28 | End: 2021-08-28

## 2021-08-28 RX ORDER — MIDAZOLAM HYDROCHLORIDE 2 MG/2ML
INJECTION, SOLUTION INTRAMUSCULAR; INTRAVENOUS AS NEEDED
Status: DISCONTINUED | OUTPATIENT
Start: 2021-08-28 | End: 2021-08-28

## 2021-08-28 RX ORDER — HYDROMORPHONE HCL IN WATER/PF 6 MG/30 ML
0.2 PATIENT CONTROLLED ANALGESIA SYRINGE INTRAVENOUS EVERY 4 HOURS PRN
Status: DISCONTINUED | OUTPATIENT
Start: 2021-08-28 | End: 2021-08-30

## 2021-08-28 RX ORDER — EPHEDRINE SULFATE 50 MG/ML
INJECTION INTRAVENOUS AS NEEDED
Status: DISCONTINUED | OUTPATIENT
Start: 2021-08-28 | End: 2021-08-28

## 2021-08-28 RX ORDER — AMOXICILLIN 250 MG
1 CAPSULE ORAL 2 TIMES DAILY PRN
Status: DISCONTINUED | OUTPATIENT
Start: 2021-08-28 | End: 2021-08-31 | Stop reason: HOSPADM

## 2021-08-28 RX ORDER — MAGNESIUM HYDROXIDE 1200 MG/15ML
LIQUID ORAL AS NEEDED
Status: DISCONTINUED | OUTPATIENT
Start: 2021-08-28 | End: 2021-08-28 | Stop reason: HOSPADM

## 2021-08-28 RX ORDER — GLYCOPYRROLATE 0.2 MG/ML
INJECTION INTRAMUSCULAR; INTRAVENOUS AS NEEDED
Status: DISCONTINUED | OUTPATIENT
Start: 2021-08-28 | End: 2021-08-28

## 2021-08-28 RX ORDER — ONDANSETRON 2 MG/ML
4 INJECTION INTRAMUSCULAR; INTRAVENOUS EVERY 4 HOURS PRN
Status: DISCONTINUED | OUTPATIENT
Start: 2021-08-28 | End: 2021-08-31 | Stop reason: HOSPADM

## 2021-08-28 RX ORDER — FENTANYL CITRATE/PF 50 MCG/ML
25 SYRINGE (ML) INJECTION
Status: CANCELLED | OUTPATIENT
Start: 2021-08-28

## 2021-08-28 RX ORDER — ONDANSETRON 2 MG/ML
INJECTION INTRAMUSCULAR; INTRAVENOUS AS NEEDED
Status: DISCONTINUED | OUTPATIENT
Start: 2021-08-28 | End: 2021-08-28

## 2021-08-28 RX ORDER — ONDANSETRON 2 MG/ML
4 INJECTION INTRAMUSCULAR; INTRAVENOUS ONCE AS NEEDED
Status: CANCELLED | OUTPATIENT
Start: 2021-08-28

## 2021-08-28 RX ORDER — SODIUM CHLORIDE, SODIUM GLUCONATE, SODIUM ACETATE, POTASSIUM CHLORIDE, MAGNESIUM CHLORIDE, SODIUM PHOSPHATE, DIBASIC, AND POTASSIUM PHOSPHATE .53; .5; .37; .037; .03; .012; .00082 G/100ML; G/100ML; G/100ML; G/100ML; G/100ML; G/100ML; G/100ML
75 INJECTION, SOLUTION INTRAVENOUS CONTINUOUS
Status: DISCONTINUED | OUTPATIENT
Start: 2021-08-28 | End: 2021-08-29

## 2021-08-28 RX ADMIN — ACETAMINOPHEN 650 MG: 325 TABLET, FILM COATED ORAL at 09:52

## 2021-08-28 RX ADMIN — SODIUM CHLORIDE 500 ML: 0.9 INJECTION, SOLUTION INTRAVENOUS at 07:15

## 2021-08-28 RX ADMIN — GLYCOPYRROLATE 0.5 MG: 0.2 INJECTION, SOLUTION INTRAMUSCULAR; INTRAVENOUS at 13:07

## 2021-08-28 RX ADMIN — TRANEXAMIC ACID 1000 MG: 1 INJECTION, SOLUTION INTRAVENOUS at 12:05

## 2021-08-28 RX ADMIN — FOLIC ACID 1 MG: 5 INJECTION, SOLUTION INTRAMUSCULAR; INTRAVENOUS; SUBCUTANEOUS at 14:12

## 2021-08-28 RX ADMIN — HYDROMORPHONE HYDROCHLORIDE 0.2 MG: 0.2 INJECTION, SOLUTION INTRAMUSCULAR; INTRAVENOUS; SUBCUTANEOUS at 09:52

## 2021-08-28 RX ADMIN — SODIUM CHLORIDE, SODIUM GLUCONATE, SODIUM ACETATE, POTASSIUM CHLORIDE, MAGNESIUM CHLORIDE, SODIUM PHOSPHATE, DIBASIC, AND POTASSIUM PHOSPHATE 75 ML/HR: .53; .5; .37; .037; .03; .012; .00082 INJECTION, SOLUTION INTRAVENOUS at 10:52

## 2021-08-28 RX ADMIN — MORPHINE SULFATE 2 MG: 2 INJECTION, SOLUTION INTRAMUSCULAR; INTRAVENOUS at 07:15

## 2021-08-28 RX ADMIN — ALBUMIN (HUMAN): 12.5 INJECTION, SOLUTION INTRAVENOUS at 12:20

## 2021-08-28 RX ADMIN — NEOSTIGMINE METHYLSULFATE 3 MG: 1 INJECTION INTRAVENOUS at 13:07

## 2021-08-28 RX ADMIN — PRAVASTATIN SODIUM 20 MG: 20 TABLET ORAL at 15:20

## 2021-08-28 RX ADMIN — ASPIRIN 81 MG: 81 TABLET, COATED ORAL at 15:20

## 2021-08-28 RX ADMIN — DOCUSATE SODIUM AND SENNOSIDES 1 TABLET: 8.6; 5 TABLET ORAL at 09:53

## 2021-08-28 RX ADMIN — MIDAZOLAM 2 MG: 1 INJECTION INTRAMUSCULAR; INTRAVENOUS at 12:04

## 2021-08-28 RX ADMIN — ACETAMINOPHEN 650 MG: 325 TABLET, FILM COATED ORAL at 15:20

## 2021-08-28 RX ADMIN — OXYCODONE HYDROCHLORIDE 5 MG: 5 TABLET ORAL at 23:44

## 2021-08-28 RX ADMIN — HYDROMORPHONE HYDROCHLORIDE 0.2 MG: 0.2 INJECTION, SOLUTION INTRAMUSCULAR; INTRAVENOUS; SUBCUTANEOUS at 15:20

## 2021-08-28 RX ADMIN — METOPROLOL SUCCINATE 50 MG: 50 TABLET, EXTENDED RELEASE ORAL at 17:56

## 2021-08-28 RX ADMIN — OXYCODONE HYDROCHLORIDE 5 MG: 5 TABLET ORAL at 09:53

## 2021-08-28 RX ADMIN — FENTANYL CITRATE 50 MCG: 50 INJECTION, SOLUTION INTRAMUSCULAR; INTRAVENOUS at 12:12

## 2021-08-28 RX ADMIN — HYDROMORPHONE HYDROCHLORIDE 0.2 MG: 0.2 INJECTION, SOLUTION INTRAMUSCULAR; INTRAVENOUS; SUBCUTANEOUS at 21:19

## 2021-08-28 RX ADMIN — ENOXAPARIN SODIUM 40 MG: 100 INJECTION SUBCUTANEOUS at 15:19

## 2021-08-28 RX ADMIN — EPHEDRINE SULFATE 10 MG: 50 INJECTION, SOLUTION INTRAVENOUS at 12:18

## 2021-08-28 RX ADMIN — TETANUS TOXOID, REDUCED DIPHTHERIA TOXOID AND ACELLULAR PERTUSSIS VACCINE, ADSORBED 0.5 ML: 5; 2.5; 8; 8; 2.5 SUSPENSION INTRAMUSCULAR at 07:00

## 2021-08-28 RX ADMIN — ONDANSETRON 4 MG: 2 INJECTION INTRAMUSCULAR; INTRAVENOUS at 12:39

## 2021-08-28 RX ADMIN — NICOTINE 1 PATCH: 21 PATCH, EXTENDED RELEASE TRANSDERMAL at 09:54

## 2021-08-28 RX ADMIN — CEFAZOLIN SODIUM 2000 MG: 2 SOLUTION INTRAVENOUS at 12:06

## 2021-08-28 RX ADMIN — HYDROMORPHONE HYDROCHLORIDE 0.2 MG: 0.2 INJECTION, SOLUTION INTRAMUSCULAR; INTRAVENOUS; SUBCUTANEOUS at 11:04

## 2021-08-28 RX ADMIN — IOHEXOL 100 ML: 350 INJECTION, SOLUTION INTRAVENOUS at 06:45

## 2021-08-28 RX ADMIN — OXYCODONE HYDROCHLORIDE 5 MG: 5 TABLET ORAL at 11:04

## 2021-08-28 RX ADMIN — ROCURONIUM BROMIDE 50 MG: 10 INJECTION, SOLUTION INTRAVENOUS at 12:12

## 2021-08-28 RX ADMIN — FENTANYL CITRATE 50 MCG: 50 INJECTION, SOLUTION INTRAMUSCULAR; INTRAVENOUS at 13:23

## 2021-08-28 RX ADMIN — ZOLPIDEM TARTRATE 5 MG: 5 TABLET ORAL at 23:45

## 2021-08-28 RX ADMIN — PROPOFOL 200 MG: 10 INJECTION, EMULSION INTRAVENOUS at 12:12

## 2021-08-28 RX ADMIN — DOCUSATE SODIUM AND SENNOSIDES 1 TABLET: 8.6; 5 TABLET ORAL at 15:20

## 2021-08-28 RX ADMIN — LIDOCAINE HYDROCHLORIDE 50 MG: 10 INJECTION, SOLUTION EPIDURAL; INFILTRATION; INTRACAUDAL; PERINEURAL at 12:12

## 2021-08-28 RX ADMIN — SODIUM CHLORIDE, SODIUM LACTATE, POTASSIUM CHLORIDE, AND CALCIUM CHLORIDE: .6; .31; .03; .02 INJECTION, SOLUTION INTRAVENOUS at 12:00

## 2021-08-28 NOTE — OP NOTE
OPERATIVE NOTE    PATIENT: Ramila Hamlin  MRN: [id number]  DATE OF SURGERY: 08/28/21     SURGEON: HUMBERTO Salguero: None    An assistant was necessary for the surgery given the complexity of the operation, and the need for a skilled surgical assistant for proper retraction of critical soft tissues including ligaments, tendons, and neurovascular structures, as well as adjacent bony structures  Precise retractor placement and maintenance of precise retraction is critical, as is skilled positioning of the limb during the different parts of the procedure  No qualified resident was available  PREOPERATIVE DIAGNOSIS:  right pertrochanteric femur fracture    POSTOPERATIVE DIAGNOSIS: Same    PROCEDURE:  right femur closed reduction and intramedullary nailing    STAFF:   Circulator: Real Cartagena RN  Radiology Technologist: Thompson Ferguson  Scrub Person: José Manuel Clark    ANESTHESIA: Choice    ESTIMATED BLOOD LOSS: Minimal    IMPLANTS:  Implant Name Type Inv  Item Serial No   Lot No  LRB No  Used Action   NAIL IM 12MM 130 DEG TI ISH TFNA 235MM RT  STRL - [id number]  NAIL IM 12MM 130 DEG TI ISH TFNA 235MM RT  STRL  Synthes [id number] Right 1 Implanted   SCREW TFNA FENESTRATED 115MM  STRL - [id number]  SCREW TFNA FENESTRATED 115MM  STRL  Synthes [id number] Right 1 Implanted   SCREW TFNA FENESTRATED 115MM  STRL - [id number]  SCREW TFNA FENESTRATED 115MM  STRL  Synthes [id number] Right 1 Implanted       COMPLICATIONS: None  INDICATIONS: Ramila Hamlin is a 66y o -year-old male who was involved in a ground level fall and sustained a right femur fracture  The risks and benefits of the procedure discussed with the patient prior to the procedure and all questions were answered; consent was obtained  DESCRIPTION OF PROCEDURE: The patient was brought to the operating room and placed supine on the operating table    The patient's leg had been signed prior to the procedure and this was documented  The patient had the anesthesia placed by the anesthesiologist   The prep verification and incision time-outs were performed to confirm that this was the correct patient, site, side and location  The patient had an SCD on the opposite lower extremity  The patient did receive antibiotics prior to the incision and was redosed during the procedure as needed at indicated intervals  The patient had the lower extremity prepped and draped in the standard surgical fashion  The patient was transferred to the fracture table and traction was pulled and the leg was adducted to achieve reduction of the fracture  Once fracture reduction was felt to be accetable the procedure continued  At the hip, the starting point was first found with the guidewire and this was inserted under fluoroscopic visualization  The guidewire was placed partially down into the femur and then the incision was made in the skin and subcutaneous tissue to the fascia  and then the opening reamer was placed over this  All radiographs were confirmed throughout the procedure on both AP and lateral views  The ball-tipped guidewire was then placed down to the distal portion of the femur in the proper location and the measuring guide was used to measure off of this after the femur was brought out to length  Sequential reaming was then performed to give some chatter  The nail itself was then inserted over the wire and then the guidewire was removed  The proximal screw was placed through the jig in the standard fashion, first incising the skin, subcutaneous tissue, and fascia, then spreading with a clamp  The drill was placed and confirmed fluoroscopically, followed by measuring, then placing the proximal screw by hand  Attention was then turned to the distal interlocking screws  The distal interlocking screw was placed through the jig, drilling, measuring and placing by hand    Final x-rays were taken on both AP and lateral views to confirm all of the screw placements  The wounds were copiously irrigated with saline and then the deep fascia was closed with 0 Vicryl figure-of-eight interrupted sutures  The subcutaneous tissue was closed with 2-0 vicryl and the skin was reapproximated with staples  The wounds were cleaned and dried a final time and a sterile dressing (consisting of xeroform, 4 x 4s, and Ioban) was placed  The patient was then transferred to a bed and left the operating room in stable condition  All counts were correct at the end of the case  POSTOPERATIVE PLAN: Megan Pack will be WBAT and will return 2 weeks for staple removal and x-rays  Soledad Marcus MD  [facility] [address] [place] [address]  1:15 PM

## 2021-08-28 NOTE — ANESTHESIA POSTPROCEDURE EVALUATION
Post-Op Assessment Note    CV Status:  Stable  Pain Score: 0    Pain management: adequate     Mental Status:  Awake   Hydration Status:  Euvolemic   PONV Controlled:  Controlled   Airway Patency:  Patent      Post Op Vitals Reviewed: Yes      Staff: CRNA         No complications documented      BP   110/62   Temp      Pulse  82   Resp      SpO2   99 on 6L

## 2021-08-28 NOTE — ASSESSMENT & PLAN NOTE
· Right hip fracture, sustained after a fall after drinking heavily  · Right femur x-ray- right trochanteric femur fracture  · Orthopedics planning for repair this afternoon  · Medically cleared for surgery from my standpoint, benefits of surgical repair outweigh risk  · RCRI 1 point, class II risk, with 6% 30 day risk of death, MI or cardiac arrest, benefits of repair outweighs risk  · Hold DVT prophylaxis till after surgery  · Pain control  · Place on IV fluids given elevated blood alcohol level  · P T /OT consulted

## 2021-08-28 NOTE — ED PROVIDER NOTES
Emergency Department Trauma Note  Ann Arrington 66 y o  male MRN: [id number]  Unit/Bed#: /-01 Encounter: [id number]      Trauma Alert: Trauma Acuity: Trauma Evaluation  Model of Arrival: Mode of Arrival: BLS via Trauma Squad Name and Number: [facility] Team: Current Providers  Attending Provider: Lu Grant DO  Attending Provider: Priya Moyer MD  Registered Nurse: Maddie Dueñas RN  Registered Nurse: Shirley Roblero RN  Registered Nurse: Matilda Chowdhury RN  Care Manager: DAYANA SimsW  Surgeon: Janny Vega MD  Consulting Physician: Ivan Simmons MD  Registered Nurse: Lu Thompson RN  Respiratory Therapist: Carol Ann Black RT  Registered Nurse: Derek George RN  Nursing Student: Bailee Olvera  Graduate Nurse: Asim Alfaro  Registered Nurse: German Johnson RN  Consultants: Orthopaedics: Dr Paul Enriquez  Time of Arrival: 7:30       History of Present Illness     Chief Complaint:   Chief Complaint   Patient presents with   [place] Fall     EMS stated that they found pt laying on the floor inside his house  EMS stated that pt fell due to drinking ETOH tonight  pt states that he drank 10oz od voka and 6pack of beer  pt states thathe normally drinks 8oz of voka and beer twice a week  pt stated that he was trying to go to bed when he fell in the livingroom around 0230  pt states that he fell forward  pt is having right hip pain,back pain  pt was placed in c-collar  pt denies hitting his head  pt denies LOC  pt states he takes ASA  HPI:  Ann Arrington is a 66 y o  male who presents with Fall  Mechanism:Details of Incident: Pt drinking, attempted to go to bed and pt fell face forward in living room onto carpet, denies hitting head or any other furniture    Injury Date: 08/28/21 Injury Time: 180      29-year-old male past medical history of alcohol abuse, previous CABG, hypertension, diabetes presents for evaluation of fall that happened at around 02:30, patient states that he drinks alcohol daily mostly drinks multiple shots of vodka and last night also had some beer he states that he was trying to make it to bed but was "too drunk" and fell forward in his living room, patient denies striking any objects around him, remembers the entire fall, states that he did not hit his head  Felt immediate pain in his right hip worse with movement better with rest, he laid there for several hours EMS were called to his house around 06:00  On their evaluation patient was alert and oriented x4, in no acute respiratory distress, glucose 92 complaining only of right hip pain  Review of Systems   Unable to perform ROS: Other (Alcohol intoxication)   Constitutional: Negative for appetite change, chills and fever  HENT: Negative for rhinorrhea and sore throat  Eyes: Negative for photophobia and visual disturbance  Respiratory: Negative for cough and shortness of breath  Cardiovascular: Negative for chest pain and palpitations  Gastrointestinal: Negative for abdominal pain and diarrhea  Genitourinary: Negative for dysuria, frequency and urgency  Musculoskeletal:        Right hip pain   Skin: Negative for rash  Neurological: Negative for dizziness and weakness  All other systems reviewed and are negative        Historical Information     Immunizations:   Immunization History   Administered Date(s) Administered    Fluzone Split Quad 0 5 mL 09/18/2014, 12/19/2016    H1N1, All Formulations 11/24/2009    INFLUENZA 09/18/2014, 10/08/2015, 12/19/2016, 11/23/2018    Influenza Split High Dose Preservative Free IM 09/28/2017, 11/23/2018, 10/01/2019    Influenza, seasonal, injectable 11/17/2011    Influenza, seasonal, injectable, preservative free 10/10/2013, 10/07/2020    Pneumococcal Conjugate 13-Valent 06/12/2015    Pneumococcal Polysaccharide PPV23 04/07/2014    SARS-CoV-2 / COVID-19 mRNA IM (Pfizer-BioNTech) 03/08/2021, 04/01/2021    Tdap 08/28/2021    Zoster 01/08/2010 Past Medical History:   Diagnosis Date    Alcohol abuse     Sparks esophagus     Coronary artery disease     Fracture     neck    Hypertension        Family History   Problem Relation Age of Onset    Diabetes Brother     Heart disease Mother     COPD Neg Hx     Asthma Neg Hx     Substance Abuse Neg Hx     Mental illness Neg Hx      Past Surgical History:   Procedure Laterality Date    APPENDECTOMY      CARDIAC SURGERY      CABG    IR THORACENTESIS  2020    LIPOMA RESECTION      SPINE SURGERY  2019    Upper cervical fusion     Social History     Tobacco Use    Smoking status: Current Every Day Smoker     Types: Cigarettes    Smokeless tobacco: Never Used   Vaping Use    Vaping Use: Never used   Substance Use Topics    Alcohol use: Yes     Alcohol/week: 3 0 standard drinks     Types: 3 Shots of liquor per week     Comment: , pts drinking is down, maybe 3 shots, but not everyday   Drug use: Never     E-Cigarette/Vaping    E-Cigarette Use Never User      E-Cigarette/Vaping Substances       Family History: non-contributory    Meds/Allergies   Prior to Admission Medications   Prescriptions Last Dose Informant Patient Reported? Taking?    Polyethylene Glycol 3350 (MIRALAX PO) 2021 at Unknown time Spouse/Significant Other Yes Yes   Sig: every 24 hours   aspirin (ECOTRIN LOW STRENGTH) 81 mg EC tablet 2021 at Unknown time Spouse/Significant Other Yes Yes   Sig: Take 81 mg by mouth daily   folic acid (FOLVITE) 1 mg tablet 2021 at Unknown time Spouse/Significant Other Yes Yes   Sig: every 24 hours   furosemide (LASIX) 20 mg tablet 2021 at Unknown time  No Yes   Sig: Take 2 tablets (40 mg total) by mouth daily   ipratropium (ATROVENT) 0 06 % nasal spray Not Taking at Unknown time  No No   Si sprays into each nostril 2 (two) times a day   Patient not taking: Reported on 2021   metoprolol succinate (TOPROL-XL) 50 mg 24 hr tablet 2021 at Unknown time  No Yes   Sig: Take 1 tablet (50 mg total) by mouth 2 (two) times a day   pantoprazole (PROTONIX) 40 mg tablet 2021 at Unknown time  No Yes   Sig: Take 1 tablet (40 mg total) by mouth daily   potassium chloride (K-DUR) 10 mEq tablet 2021 at Unknown time Self Yes Yes   Si every other day   pravastatin (PRAVACHOL) 20 mg tablet 2021 at Unknown time  No Yes   Sig: TAKE 1 TABLET BY MOUTH EVERY DAY   sildenafil (REVATIO) 20 mg tablet 2021 at Unknown time  No Yes   Sig: Take 3 tablets (60 mg total) by mouth as needed (sexual activity)   thiamine (VITAMIN B1) 100 mg tablet 2021 at Unknown time  No Yes   Sig: Take 1 tablet (100 mg total) by mouth every 24 hours   vitamin B-12 (CYANOCOBALAMIN) 2000 MCG TABS 2021 at Unknown time  No Yes   Sig: Take 1 tablet (2,000 mcg total) by mouth daily   zolpidem (AMBIEN CR) 6 25 MG CR tablet 2021 at Unknown time  No Yes   Sig: Take 1 tablet (6 25 mg total) by mouth daily at bedtime as needed for sleep      Facility-Administered Medications: None       Allergies   Allergen Reactions    Ampicillin Lip Swelling     Other reaction(s): Unknown, Unknown Reaction       PHYSICAL EXAM    PE limited by: None    Objective   Vitals:   First set: Temperature: (!) 97 2 °F (36 2 °C) (2116)  Pulse: 78 (21 0615)  Respirations: 22 (21)  Blood Pressure: (!) 177/98 (21 0615)  SpO2: 96 % (21)    Primary Survey:   (A) Airway: Intact  (B) Breathing: CTA B/L  (C) Circulation: Pulses:   normal  (D) Disabliity:  GCS Total:  15  (E) Expose:  Completed    Secondary Survey: (Click on Physical Exam tab above)  Physical Exam  Vitals and nursing note reviewed  Constitutional:       Appearance: He is well-developed  HENT:      Head: Normocephalic and atraumatic        Comments: No tenderness over facial bones, no trismus, no malocclusion     Right Ear: External ear normal       Left Ear: External ear normal       Mouth/Throat:      Mouth: Mucous membranes are moist    Eyes:      Conjunctiva/sclera: Conjunctivae normal       Pupils: Pupils are equal, round, and reactive to light  Neck:      Vascular: No JVD  Trachea: No tracheal deviation  Cardiovascular:      Rate and Rhythm: Normal rate and regular rhythm  Heart sounds: Normal heart sounds  No murmur heard  No friction rub  No gallop  Pulmonary:      Effort: Pulmonary effort is normal  No respiratory distress  Breath sounds: No stridor  No wheezing or rales  Chest:      Chest wall: No tenderness  Abdominal:      General: There is no distension  Palpations: Abdomen is soft  There is no mass  Tenderness: There is no abdominal tenderness  There is no guarding or rebound  Musculoskeletal:         General: Normal range of motion  Cervical back: Normal range of motion and neck supple  Comments: Tenderness to palpation over right hip, otherwise no pelvic instability    Distally bilateral lower extremities neurovascularly intact   Skin:     General: Skin is warm and dry  Coloration: Skin is not pale  Findings: No erythema or rash  Comments: Abrasion to the right elbow, nontender to palpation, intact range of motion, distally neurovascularly intact   Neurological:      General: No focal deficit present  Mental Status: He is alert and oriented to person, place, and time  Mental status is at baseline  Cranial Nerves: No cranial nerve deficit  Psychiatric:         Mood and Affect: Mood normal          Cervical spine cleared by clinical criteria?  No (imaging required)      Invasive Devices     Peripheral Intravenous Line            Peripheral IV 08/28/21 Right Antecubital <1 day    Peripheral IV 08/28/21 Right Hand <1 day                Lab Results:   Results Reviewed     Procedure Component Value Units Date/Time    Basic metabolic panel [[id number]]  (Abnormal) Collected: 08/28/21 0620    Lab Status: Final result Specimen: Blood from Arm, Right Updated: 08/28/21 0656     Sodium 132 mmol/L      Potassium 4 5 mmol/L      Chloride 98 mmol/L      CO2 24 mmol/L      ANION GAP 10 mmol/L      BUN 14 mg/dL      Creatinine 1 10 mg/dL      Glucose 83 mg/dL      Calcium 8 3 mg/dL      eGFR 66 ml/min/1 73sq m     Narrative:      [place] guidelines for Chronic Kidney Disease (CKD):     Stage 1 with normal or high GFR (GFR > 90 mL/min/1 73 square meters)    Stage 2 Mild CKD (GFR = 60-89 mL/min/1 73 square meters)    Stage 3A Moderate CKD (GFR = 45-59 mL/min/1 73 square meters)    Stage 3B Moderate CKD (GFR = 30-44 mL/min/1 73 square meters)    Stage 4 Severe CKD (GFR = 15-29 mL/min/1 73 square meters)    Stage 5 End Stage CKD (GFR <15 mL/min/1 73 square meters)  Note: GFR calculation is accurate only with a steady state creatinine    Hepatic function panel [[id number]]  (Abnormal) Collected: 08/28/21 0620    Lab Status: Final result Specimen: Blood from Arm, Right Updated: 08/28/21 0652     Total Bilirubin 0 60 mg/dL      Bilirubin, Direct 0 28 mg/dL      Alkaline Phosphatase 58 U/L      AST 33 U/L      ALT 31 U/L      Total Protein 7 5 g/dL      Albumin 3 4 g/dL     CK (with reflex to MB) [[id number]]  (Normal) Collected: 08/28/21 0620    Lab Status: Final result Specimen: Blood from Arm, Right Updated: 08/28/21 0652     Total CK 93 U/L     Troponin I [[id number]]  (Normal) Collected: 08/28/21 0620    Lab Status: Final result Specimen: Blood from Arm, Right Updated: 08/28/21 0650     Troponin I <0 02 ng/mL     Protime-INR [[id number]]  (Abnormal) Collected: 08/28/21 0620    Lab Status: Final result Specimen: Blood from Arm, Right Updated: 08/28/21 0648     Protime 14 6 seconds      INR 1 14    APTT [[id number]]  (Normal) Collected: 08/28/21 0620    Lab Status: Final result Specimen: Blood from Arm, Right Updated: 08/28/21 0648     PTT 34 seconds     Ethanol [[id number]]  (Abnormal) Collected: 08/28/21 0620    Lab Status: Final result Specimen: Blood from Arm, Right Updated: 08/28/21 0643     Ethanol Lvl 235 mg/dL     CBC and differential [[id number]]  (Abnormal) Collected: 08/28/21 0620    Lab Status: Final result Specimen: Blood from Arm, Right Updated: 08/28/21 0628     WBC 10 63 Thousand/uL      RBC 4 46 Million/uL      Hemoglobin 13 8 g/dL      Hematocrit 41 4 %      MCV 93 fL      MCH 30 9 pg      MCHC 33 3 g/dL      RDW 13 2 %      MPV 9 8 fL      Platelets 996 Thousands/uL      nRBC 0 /100 WBCs      Neutrophils Relative 74 %      Immat GRANS % 1 %      Lymphocytes Relative 13 %      Monocytes Relative 10 %      Eosinophils Relative 1 %      Basophils Relative 1 %      Neutrophils Absolute 7 97 Thousands/µL      Immature Grans Absolute 0 12 Thousand/uL      Lymphocytes Absolute 1 34 Thousands/µL      Monocytes Absolute 1 08 Thousand/µL      Eosinophils Absolute 0 07 Thousand/µL      Basophils Absolute 0 05 Thousands/µL     Rapid drug screen, urine [[id number]]     Lab Status: No result Specimen: Urine     UA w Reflex to Microscopic w Reflex to Culture [[id number]]     Lab Status: No result Specimen: Urine                  Imaging Studies:   Direct to CT: Yes  XR femur 2 vw right   Final Result by Simon Galo MD (08/28 1817)      No acute osseous abnormality  Patient's known intertrochanteric fracture is not included in the field-of-view  Workstation performed: [id number]         XR hip/pelv 2-3 vws right if performed   Final Result by Simon Galo MD (08/28 1816)      Intertrochanteric fracture of right femur  Workstation performed: [id number]         XR Trauma pelvis ap only 1 or 2 vw   ED Interpretation by Georgina Calhoun DO (08/28 8103)   R hip fx      Final Result by Simon Galo MD (08/28 1832)      Intertrochanteric fracture of right femur        Workstation performed: [id number]         XR Trauma chest portable   ED Interpretation by Georgina Calhoun DO (08/28 1093)   No ptx, L effusion      Final Result by Estrella Valiente MD (08/28 1833)      Chronic moderate left pleural effusion with left basilar atelectasis  Workstation performed: [id number]         TRAUMA - CT spine cervical wo contrast   Final Result by Orquidea Fuller MD (08/28 4824)      No acute compression collapse of the vertebra seen             Workstation performed: [id number]         TRAUMA - CT head wo contrast   Final Result by Orquidea Fuller MD (08/28 4840)      No acute intracranial hemorrhage seen   No extra-axial collection                  Workstation performed: [id number]         TRAUMA - CT chest abdomen pelvis w contrast   Final Result by Orquidea Fuller MD (08/28 6393)   No solid visceral injury seen   Intertrochanteric fracture of the right femur with mild comminution of the greater trochanter and lesser trochanter with varus alignment      Chronic left effusion with rounded atelectasis   No new consolidation          I personally discussed this study with YANE LAROSE on 8/28/2021 at 7:43 AM                Workstation performed: [id number]         XR hip/pelv 2-3 vws right if performed    (Results Pending)         Procedures  Procedures         ED Course  ED Course as of Aug 28 2315   Sat Aug 28, 2021   0657 Baseline   Sodium(!): 132   0701 Patient has a chronic left-sided pleural effusion with chronic left-sided atelectasis      0706 Procedure Note: EKG  Date/Time: 08/28/21 7:06 AM   Performed by: Jade Crockett  Authorized by: Jade Crockett  Indications / Diagnosis: Fall  ECG reviewed by me, the ED Provider: yes   The EKG demonstrates:  Rhythm: normal sinus  Intervals: normal intervals  Axis: normal axis  QRS/Blocks: normal QRS  ST Changes: No acute ST Changes, no STD/MORENA    Inverted T waves in III, aVF    No previous available, No CP      0724 Cervical collar cleared              MDM  Number of Diagnoses or Management Options  Alcohol intoxication ([address] )  Closed right hip fracture, initial encounter [facility])  Fall, initial encounter  Diagnosis management comments: 75-year-old male status post fall 4 hours ago, he is not on blood thinners currently alert and oriented x4 however does have alcohol on board, will check blood work, imaging, will re-evaluate          Disposition  [facility] Transfer: No  Final diagnoses:   Fall, initial encounter   Closed right hip fracture, initial encounter ([facility] )   Alcohol intoxication ([place] [address] )     Time reflects when diagnosis was documented in both MDM as applicable and the Disposition within this note     Time User Action Codes Description Comment    8/28/2021  7:09 AM Richad Boyer Add [I83  UIKP] Fall, initial encounter     8/28/2021  7:09 AM Richad Boyer Add [S72 001A] Closed right hip fracture, initial encounter ([place] [address] )     8/28/2021  7:09 AM Richad Boyer Add [F10 929] Alcohol intoxication ([place] [address] )     8/28/2021  9:12 AM Ricke Roof Add [S72 001A] Closed fracture of right hip, initial encounter ([place] [address] )     8/28/2021  2:11 PM Omega Milder Add [R33 9] Urinary retention       ED Disposition     ED Disposition Condition Date/Time Comment    Admit Stable Sat Aug 28, 2021  7:44 AM Case was discussed with NIMA and the patient's admission status was agreed to be Admission Status: inpatient status to the service of Dr Yaniv Ziegler   Follow-up Information    None       Current Discharge Medication List      CONTINUE these medications which have NOT CHANGED    Details   aspirin (ECOTRIN LOW STRENGTH) 81 mg EC tablet Take 81 mg by mouth daily      folic acid (FOLVITE) 1 mg tablet every 24 hours      furosemide (LASIX) 20 mg tablet Take 2 tablets (40 mg total) by mouth daily  Qty: 180 tablet, Refills: 0    Associated Diagnoses: S/P CABG (coronary artery bypass graft);  Pleural effusion on left      metoprolol succinate (TOPROL-XL) 50 mg 24 hr tablet Take 1 tablet (50 mg total) by mouth 2 (two) times a day  Qty: 60 tablet, Refills: 5    Associated Diagnoses: Essential hypertension pantoprazole (PROTONIX) 40 mg tablet Take 1 tablet (40 mg total) by mouth daily  Qty: 30 tablet, Refills: 5    Associated Diagnoses: Gastroesophageal reflux disease without esophagitis      Polyethylene Glycol 3350 (MIRALAX PO) every 24 hours      potassium chloride (K-DUR) 10 mEq tablet 1 every other day  Refills: 3      pravastatin (PRAVACHOL) 20 mg tablet TAKE 1 TABLET BY MOUTH EVERY DAY  Qty: 90 tablet, Refills: 1    Associated Diagnoses: Hypercholesteremia      sildenafil (REVATIO) 20 mg tablet Take 3 tablets (60 mg total) by mouth as needed (sexual activity)  Qty: 30 tablet, Refills: 1    Associated Diagnoses: Other male erectile dysfunction      thiamine (VITAMIN B1) 100 mg tablet Take 1 tablet (100 mg total) by mouth every 24 hours  Qty: 30 tablet, Refills: 5    Associated Diagnoses: Alcohol use disorder, mild, in controlled environment      vitamin B-12 (CYANOCOBALAMIN) 2000 MCG TABS Take 1 tablet (2,000 mcg total) by mouth daily  Qty: 30 tablet, Refills: 5    Associated Diagnoses: B12 deficiency      zolpidem (AMBIEN CR) 6 25 MG CR tablet Take 1 tablet (6 25 mg total) by mouth daily at bedtime as needed for sleep  Qty: 30 tablet, Refills: 0    Comments: Discontinue 12 5 mg dose! Associated Diagnoses: Other insomnia      ipratropium (ATROVENT) 0 06 % nasal spray 2 sprays into each nostril 2 (two) times a day  Qty: 15 mL, Refills: 3    Associated Diagnoses: Chronic rhinitis           No discharge procedures on file      PDMP Review       Value Time User    PDMP Reviewed  Yes 8/28/2021  2:49 PM Ryanne Meza MD          ED Provider  Electronically Signed by         Lissette Diaz DO  08/28/21 0722

## 2021-08-28 NOTE — ANESTHESIA PREPROCEDURE EVALUATION
Procedure:  INSERTION NAIL IM FEMUR ANTEGRADE (TROCHANTERIC) (Right Leg Upper)    Relevant Problems   CARDIO   (+) Coronary artery disease involving native coronary artery of native heart without angina pectoris   (+) Essential hypertension   (+) Hypercholesteremia   (+) Nonrheumatic aortic valve insufficiency   (+) Paroxysmal atrial fibrillation (HCC)   (+) S/P CABG (coronary artery bypass graft)      GI/HEPATIC   (+) Gastroesophageal reflux disease without esophagitis      PULMONARY   (+) Pleural effusion      Right femur fracture  ETOH: vodka/beer, has been in withdrawals before during prior hospital admission  GERD  CABG x 1 in 2014  Cervical fusion 2019    ETOH 235 upon admission          Physical Exam    Airway    Mallampati score: III  TM Distance: >3 FB  Neck ROM: full     Dental   No notable dental hx     Cardiovascular      Pulmonary      Other Findings        Anesthesia Plan  ASA Score- 3 Emergent    Anesthesia Type-         Additional Monitors:   Airway Plan: ETT  Comment: GA with ETT, IV, ? Desirae, T/S, antiemetics  Pt is awake, oriented x 3, and understand risks and benefits of GA   Will provide BZDs to offset ETOH withdrawal symptoms if needed  Plan Factors-    Chart reviewed  EKG reviewed  Existing labs reviewed  Patient summary reviewed  Patient is not a current smoker  Patient did not smoke on day of surgery  Induction- intravenous and rapid sequence induction      Postoperative Plan-   Planned trial extubation    Informed Consent-

## 2021-08-28 NOTE — ASSESSMENT & PLAN NOTE
History of alcohol abuse, drinks 10 ounces of vodka daily  Blood alcohol level 235  Place on [facility] protocol  On IV thiamine, folate

## 2021-08-28 NOTE — H&P
[place]  H&P- Paulo Peres 1950, 66 y o  male MRN: [id number]  Unit/Bed#: OR [place] Encounter: [id number]  Primary Care Provider: Rahat oRgers MD   Date and time admitted to hospital: 8/28/2021  6:16 AM    * Closed fracture of right hip [facility])  Assessment & Plan  · Right hip fracture, sustained after a fall after drinking heavily  · Right femur x-ray- right trochanteric femur fracture  · Orthopedics planning for repair this afternoon  · Medically cleared for surgery from my standpoint, benefits of surgical repair outweigh risk  · RCRI 1 point, class II risk, with 6% 30 day risk of death, MI or cardiac arrest, benefits of repair outweighs risk  · Hold DVT prophylaxis till after surgery  · Pain control  · Place on IV fluids given elevated blood alcohol level  · P T /OT consulted    Alcohol abuse  Assessment & Plan  History of alcohol abuse, drinks 10 ounces of vodka daily  Blood alcohol level 235  Place on CIWA protocol  On IV thiamine, folate      Ambulatory dysfunction  Assessment & Plan  H/o ambulatory dysfunction, falls exacerbated by alcohol abuse  Pt/OT    Paroxysmal atrial fibrillation (HCC)  Assessment & Plan  On metoprolol 50 mg b i d , not on anticoagulation    S/P CABG (coronary artery bypass graft)  Assessment & Plan  S/p CABG 12/16/2014 [place] to LAD    Essential hypertension  Assessment & Plan  on Lasix 20 mg daily and metoprolol 50 mg b i d     Gastroesophageal reflux disease without esophagitis  Assessment & Plan  On pantoprazole 40 mg daily    Hypercholesteremia  Assessment & Plan  On pravastatin    Other insomnia  Assessment & Plan  On ambien    VTE Prophylaxis: Enoxaparin (Lovenox)  / sequential compression device   Code Status:  Full code  POLST: There is no POLST form on file for this patient (pre-hospital)  Discussion with family:  I updated patient's wife at bedside    Anticipated Length of Stay:  Patient will be admitted on an Inpatient basis with an anticipated length of stay of  greater than 2 midnights  Justification for Hospital Stay:  Right hip fracture    Total Time for Visit, including Counseling / Coordination of Care: 60 minutes  Greater than 50% of this total time spent on direct patient counseling and coordination of care  Chief Complaint:   Right hip pain after Fall    History of Present Illness:    Horace Hitchcock is a 66 y o  male with past medical history of CABG 2014, alcohol abuse and hypertension who presents with complaints of right hip pain after a fall  Patient drinks about 10 oz of vodka almost every day, alternates with a pack of beer, and reports that after drinking last night he was trying to make it to bed but he fell, landing on his right hip  He did not lose consciousness, or hit his head  His wife heard the fall, found him, called 911 and he was brought in by EMS  On presentation, trauma workup revealed right intertrochanteric femur fracture, old right sided 11th and 12th rib fractures    Review of Systems:    Review of Systems   Constitutional: Negative for activity change, appetite change, chills, diaphoresis, fatigue and fever  Respiratory: Negative for cough, choking, shortness of breath, wheezing and stridor  Cardiovascular: Negative for chest pain, palpitations and leg swelling  Gastrointestinal: Negative for abdominal pain, constipation, diarrhea, nausea and vomiting  Genitourinary: Negative for dysuria, frequency, hematuria and urgency  Musculoskeletal: Positive for gait problem  Skin: Negative for color change, pallor, rash and wound  Neurological: Negative for tremors, seizures, syncope, speech difficulty, weakness, light-headedness and numbness  Hematological: Negative for adenopathy  Does not bruise/bleed easily  Psychiatric/Behavioral: Negative for behavioral problems, confusion, decreased concentration and hallucinations  All other systems reviewed and are negative        Past Medical and Surgical History:     Past Medical History:   Diagnosis Date    Alcohol abuse     Sparks esophagus     Coronary artery disease     Fracture     neck    Hypertension        Past Surgical History:   Procedure Laterality Date    APPENDECTOMY      CARDIAC SURGERY  2014    CABG    IR THORACENTESIS  4/29/2020    LIPOMA RESECTION      SPINE SURGERY  06/18/2019    Upper cervical fusion       Meds/Allergies:    Prior to Admission medications    Medication Sig Start Date End Date Taking?  Authorizing Provider   aspirin (ECOTRIN LOW STRENGTH) 81 mg EC tablet Take 81 mg by mouth daily   Yes Historical Provider, MD   folic acid (FOLVITE) 1 mg tablet every 24 hours 6/6/19  Yes Historical Provider, MD   furosemide (LASIX) 20 mg tablet Take 2 tablets (40 mg total) by mouth daily 1/4/21  Yes Inez Patel MD   metoprolol succinate (TOPROL-XL) 50 mg 24 hr tablet Take 1 tablet (50 mg total) by mouth 2 (two) times a day 8/12/21  Yes Inez Patel MD   pantoprazole (PROTONIX) 40 mg tablet Take 1 tablet (40 mg total) by mouth daily 4/8/21  Yes Inez Patel MD   Polyethylene Glycol 3350 (MIRALAX PO) every 24 hours 6/12/15  Yes Historical Provider, MD   potassium chloride (K-DUR) 10 mEq tablet 1 every other day 11/12/19  Yes Historical Provider, MD   pravastatin (PRAVACHOL) 20 mg tablet TAKE 1 TABLET BY MOUTH EVERY DAY 6/1/21  Yes Inez Patel MD   sildenafil (REVATIO) 20 mg tablet Take 3 tablets (60 mg total) by mouth as needed (sexual activity) 5/8/20  Yes Inez Patel MD   thiamine (VITAMIN B1) 100 mg tablet Take 1 tablet (100 mg total) by mouth every 24 hours 12/21/20  Yes Inez Patel MD   vitamin B-12 (CYANOCOBALAMIN) 2000 MCG TABS Take 1 tablet (2,000 mcg total) by mouth daily 12/21/20  Yes Inez Patel MD   zolpidem (AMBIEN CR) 6 25 MG CR tablet Take 1 tablet (6 25 mg total) by mouth daily at bedtime as needed for sleep 8/12/21  Yes Inez Patel MD   ipratropium (ATROVENT) 0 06 % nasal spray 2 sprays into each nostril 2 (two) times a day  Patient not taking: Reported on 8/28/2021 3/31/21   Lucy Burns MD     I have reviewed home medications using allscripts  Allergies: Allergies   Allergen Reactions    Ampicillin Lip Swelling     Other reaction(s): Unknown, Unknown Reaction       Social History:     Marital Status: /Civil Union   Occupation:  Retired  Patient Pre-hospital Living Situation:  Lives at home  Patient Pre-hospital Level of Mobility:  Independent, but wobbly on his feet  Patient Pre-hospital Diet Restrictions:  Cardiac  Substance Use History:   Social History     Substance and Sexual Activity   Alcohol Use Yes    Alcohol/week: 3 0 standard drinks    Types: 3 Shots of liquor per week    Comment: , pts drinking is down, maybe 3 shots, but not everyday  Social History     Tobacco Use   Smoking Status Current Every Day Smoker    Types: Cigarettes   Smokeless Tobacco Never Used     Social History     Substance and Sexual Activity   Drug Use Never       Family History:    Family History   Problem Relation Age of Onset    Diabetes Brother     Heart disease Mother     COPD Neg Hx     Asthma Neg Hx     Substance Abuse Neg Hx     Mental illness Neg Hx        Physical Exam:     Vitals:   Blood Pressure: 127/70 (08/28/21 1355)  Pulse: 78 (08/28/21 1355)  Temperature: 97 8 °F (36 6 °C) (08/28/21 1325)  Temp Source: Oral (08/28/21 1100)  Respirations: (!) 11 (08/28/21 1355)  SpO2: 92 % (08/28/21 1355)    Physical Exam  Vitals and nursing note reviewed  Constitutional:       General: He is not in acute distress  Appearance: Normal appearance  He is obese  He is not ill-appearing, toxic-appearing or diaphoretic  Cardiovascular:      Rate and Rhythm: Normal rate and regular rhythm  Pulses: Normal pulses  Heart sounds: Normal heart sounds  No murmur heard  No gallop  Pulmonary:      Effort: Pulmonary effort is normal  No respiratory distress        Breath sounds: Normal breath sounds  No stridor  No wheezing, rhonchi or rales  Chest:      Chest wall: No tenderness  Abdominal:      General: Bowel sounds are normal  There is no distension  Palpations: Abdomen is soft  Tenderness: There is no abdominal tenderness  There is no right CVA tenderness, left CVA tenderness, guarding or rebound  Musculoskeletal:         General: No swelling, tenderness, deformity or signs of injury  Normal range of motion  Right lower leg: No edema  Left lower leg: No edema  Skin:     General: Skin is warm and dry  Capillary Refill: Capillary refill takes less than 2 seconds  Coloration: Skin is not jaundiced  Findings: No bruising, erythema, lesion or rash  Neurological:      General: No focal deficit present  Mental Status: He is alert and oriented to person, place, and time  Mental status is at baseline  Cranial Nerves: No cranial nerve deficit  Psychiatric:         Mood and Affect: Mood normal          Thought Content: Thought content normal          Judgment: Judgment normal          Additional Data:     Lab Results: I have personally reviewed pertinent reports  Results from last 7 days   Lab Units 08/28/21  0620   WBC Thousand/uL 10 63*   HEMOGLOBIN g/dL 13 8   HEMATOCRIT % 41 4   PLATELETS Thousands/uL 249   NEUTROS PCT % 74   LYMPHS PCT % 13*   MONOS PCT % 10   EOS PCT % 1     Results from last 7 days   Lab Units 08/28/21  0620   SODIUM mmol/L 132*   POTASSIUM mmol/L 4 5   CHLORIDE mmol/L 98*   CO2 mmol/L 24   BUN mg/dL 14   CREATININE mg/dL 1 10   ANION GAP mmol/L 10   CALCIUM mg/dL 8 3   ALBUMIN g/dL 3 4*   TOTAL BILIRUBIN mg/dL 0 60   ALK PHOS U/L 58   ALT U/L 31   AST U/L 33   GLUCOSE RANDOM mg/dL 83     Results from last 7 days   Lab Units 08/28/21  0620   INR  1 14                   Imaging: I have personally reviewed pertinent reports        XR Trauma pelvis ap only 1 or 2 vw   ED Interpretation by Yudelka Sanchez DO (08/28 0047)   R hip fx XR Trauma chest portable   ED Interpretation by Fredy Viveros DO (08/28 8850)   No ptx, L effusion      TRAUMA - CT spine cervical wo contrast   Final Result by Sheridan Hamilton MD (08/28 1942)      No acute compression collapse of the vertebra seen             Workstation performed: [id number]         TRAUMA - CT head wo contrast   Final Result by Sheridan Hamilton MD (08/28 2480)      No acute intracranial hemorrhage seen   No extra-axial collection                  Workstation performed: [id number]         TRAUMA - CT chest abdomen pelvis w contrast   Final Result by Sheridan Hamilton MD (08/28 7734)   No solid visceral injury seen   Intertrochanteric fracture of the right femur with mild comminution of the greater trochanter and lesser trochanter with varus alignment      Chronic left effusion with rounded atelectasis   No new consolidation          I personally discussed this study with YANE LAROSE on 8/28/2021 at 7:43 AM                Workstation performed: [id number]         XR hip/pelv 2-3 vws right if performed    (Results Pending)   XR femur 2 vw right    (Results Pending)   XR hip/pelv 2-3 vws right if performed    (Results Pending)       EKG, Pathology, and Other Studies Reviewed on Admission:   · EKG:  None done in the ED    Allscripts / Epic Records Reviewed: Yes     ** Please Note: This note has been constructed using a voice recognition system   **

## 2021-08-28 NOTE — CONSULTS
Orthopaedic Surgery Consult Note    Chief Complaint:  Chief Complaint   Patient presents with    Fall     EMS stated that they found pt laying on the floor inside his house  EMS stated that pt fell due to drinking ETOH tonight  pt states that he drank 10oz od voka and 6pack of beer  pt states thathe normally drinks 8oz of voka and beer twice a week  pt stated that he was trying to go to bed when he fell in the livingroom around 0230  pt states that he fell forward  pt is having right hip pain,back pain  pt was placed in c-collar  pt denies hitting his head  pt denies LOC  pt states he takes ASA  HPI:  Megan Pack is a 66 y o  male, who sustained a ground level fall earlier this morning after consuming alcohol  He had immediate pain in his right hip and was unable to ambulate and was thus transoprted to [facility] ED for evaluation  In the ED radiographs demonstrate a right pertrochanteric femur fracture  He did have an elevated alcohol level in the ED  Reports he is an alcoholic  His wife was present at bedside as well and confirms the above information  She reports the patient did previously go into alcohol withdrawal when previously hospitalized for a spine fracture  He denies pain elsewhere  He no longer appears under the influence of alcohol and is responding appropriately and appears to comprehend the entirety of our discussion  Orthopaedics was consulted to evaluate right pertrochanteric femur fracture  Pertinent highlights from PMHx and/or other injuries include: alcoholism, GERD, prior CABG, a fib      Patient Active Problem List   Diagnosis    Other insomnia    Hypercholesteremia    Gastroesophageal reflux disease without esophagitis    Essential hypertension    S/P CABG (coronary artery bypass graft)    Alcohol use disorder, mild, in controlled environment    Pleural effusion    Coronary artery disease involving native coronary artery of native heart without angina pectoris    Paroxysmal atrial fibrillation (HCC)    Rosacea    Other male erectile dysfunction    Nonrheumatic aortic valve insufficiency    Ambulatory dysfunction    B12 deficiency    Chronic rhinitis    Closed fracture of right hip (HCC)       Past Medical History:   Diagnosis Date    Alcohol abuse     Sparks esophagus     Coronary artery disease     Fracture     neck    Hypertension        Past Surgical History:   Procedure Laterality Date    APPENDECTOMY      CARDIAC SURGERY  2014    CABG    IR THORACENTESIS  4/29/2020    LIPOMA RESECTION      SPINE SURGERY  06/18/2019    Upper cervical fusion        Social History     Socioeconomic History    Marital status: /Civil Union     Spouse name: Not on file    Number of children: Not on file    Years of education: Not on file    Highest education level: Not on file   Occupational History    Occupation: retired teacher   Tobacco Use    Smoking status: Current Every Day Smoker     Types: Cigarettes    Smokeless tobacco: Never Used   Vaping Use    Vaping Use: Never used   Substance and Sexual Activity    Alcohol use: Yes     Alcohol/week: 3 0 standard drinks     Types: 3 Shots of liquor per week     Comment: , pts drinking is down, maybe 3 shots, but not everyday   Drug use: Never    Sexual activity: Not on file   Other Topics Concern    Not on file   Social History Narrative    Wears seatbelt    Lives with Wife    Living will     Sees dentist reg    Feels safe at home     Social Determinants of Health     Financial Resource Strain: Low Risk     Difficulty of Paying Living Expenses: Not hard at all   Food Insecurity: No Food Insecurity    Worried About Running Out of Food in the Last Year: Never true    [address] in the Last Year: Never true   Transportation Needs: No Transportation Needs    Lack of Transportation (Medical): No    Lack of Transportation (Non-Medical):  No   Physical Activity: Inactive    Days of Exercise per Week: 0 days    Minutes of Exercise per Session: 0 min   Stress: No Stress Concern Present    Feeling of Stress : Not at all   Social Connections: Socially Isolated    Frequency of Communication with Friends and Family: Twice a week    Frequency of Social Gatherings with Friends and Family: Never    Attends Synagogue Services: Never    Active Member of Clubs or Organizations: No    Attends Club or Organization Meetings: Never    Marital Status:    Intimate Partner Violence: Not At Risk    Fear of Current or Ex-Partner: No    Emotionally Abused: No    Physically Abused: No    Sexually Abused: No        Family History   Problem Relation Age of Onset    Diabetes Brother     Heart disease Mother     COPD Neg Hx     Asthma Neg Hx     Substance Abuse Neg Hx     Mental illness Neg Hx         Allergies   Allergen Reactions    Ampicillin Lip Swelling     Other reaction(s): Unknown, Unknown Reaction        Prior to Admission medications    Medication Sig Start Date End Date Taking?  Authorizing Provider   aspirin (ECOTRIN LOW STRENGTH) 81 mg EC tablet Take 81 mg by mouth daily   Yes Historical Provider, MD   folic acid (FOLVITE) 1 mg tablet every 24 hours 6/6/19  Yes Historical Provider, MD   furosemide (LASIX) 20 mg tablet Take 2 tablets (40 mg total) by mouth daily 1/4/21  Yes Helen Goff MD   metoprolol succinate (TOPROL-XL) 50 mg 24 hr tablet Take 1 tablet (50 mg total) by mouth 2 (two) times a day 8/12/21  Yes Helen Goff MD   pantoprazole (PROTONIX) 40 mg tablet Take 1 tablet (40 mg total) by mouth daily 4/8/21  Yes Helen Goff MD   Polyethylene Glycol 3350 (MIRALAX PO) every 24 hours 6/12/15  Yes Historical Provider, MD   potassium chloride (K-DUR) 10 mEq tablet 1 every other day 11/12/19  Yes Historical Provider, MD   pravastatin (PRAVACHOL) 20 mg tablet TAKE 1 TABLET BY MOUTH EVERY DAY 6/1/21  Yes Helen Goff MD   sildenafil (REVATIO) 20 mg tablet Take 3 tablets (60 mg total) by mouth as needed (sexual activity) 5/8/20  Yes Hemal Cook MD   thiamine (VITAMIN B1) 100 mg tablet Take 1 tablet (100 mg total) by mouth every 24 hours 12/21/20  Yes Hemal Cook MD   vitamin B-12 (CYANOCOBALAMIN) 2000 MCG TABS Take 1 tablet (2,000 mcg total) by mouth daily 12/21/20  Yes Hemal Cook MD   zolpidem (AMBIEN CR) 6 25 MG CR tablet Take 1 tablet (6 25 mg total) by mouth daily at bedtime as needed for sleep 8/12/21  Yes Hemal Cook MD   ipratropium (ATROVENT) 0 06 % nasal spray 2 sprays into each nostril 2 (two) times a day  Patient not taking: Reported on 8/28/2021 3/31/21   Hemal Cook MD        REVIEW OF SYSTEMS:  All systems negative except as per HPI  OBJECTIVE:  Vitals:    08/28/21 0848   BP: 146/94   Pulse: 83   Resp: 19   Temp:    SpO2: 95%        PHYSICAL EXAM  GENERAL: No acute distress  Alert and oriented  Well nourished and well hydrated  Appears stated age  HEENT : Normocephalic, atraumatic  Extraocular movements intact  Mucous membranes moist  NECK: Supple, trachea midline  LUNGS: Adequate and symmetric respiratory effort  No intercostal retractions or accessory muscle use  HEART: Extremities warm and perfused  ABDOMEN: Nondistended  SKIN: Warm and dry, no rash  MUSCULOSKELETAL EXAM  Right lower extremity:  Shortened and externally rotated  Pain with logroll  Hip motor testing defered due to fracture  No open wounds or damage to skin  Sensation Intact to Light Touch in Sural, Saphenous, Tibial, Superficial Peroneal, and Deep Peroneal Nerve Distribution  Motor function 5 out of 5 strength in Tibialis Anterior, Gastrocnemius, Soleus, Extensor Hallucis Longus, and Flexor Hallucis Longus Muscles  Extremity Warm and Well Perfused  Brisk Capillary Refill in Toes  RADIOLOGY  I have personally reviewed radiology images: right pertrochanteric femur fracture with some comminution  Does not extend below the lesser trochanter on my review      PROCEDURE: none    Assessment / Recommendations:    66 y o  male with the following musculoskeletal problems:    1  Right pertrochanteric femur fracture:  - medical evaluation for optimization  - NWB  - NPO  - recommend operative fixation once medically appropriate  Discussed with Dr Alonso Ewing and she has evaluated the patient and feels that he is currently optimized for surgery  - I did speak with Dr Ivis Corea from anesthesia and she is going to evaluate the patient to determine whether he would be appropriate for OR later today or if better to wait until tomorrow  Ar Stark MD  [facility] [address] [place] [address]  9:01 AM

## 2021-08-29 PROBLEM — R33.8 ACUTE URINARY RETENTION: Status: ACTIVE | Noted: 2021-08-29

## 2021-08-29 PROBLEM — J98.11 ATELECTASIS OF LEFT LUNG: Status: ACTIVE | Noted: 2021-08-29

## 2021-08-29 PROBLEM — R00.0 SINUS TACHYCARDIA: Status: ACTIVE | Noted: 2021-08-29

## 2021-08-29 LAB
ANION GAP SERPL CALCULATED.3IONS-SCNC: 13 MMOL/L (ref 4–13)
ATRIAL RATE: 110 BPM
BILIRUB UR QL STRIP: NEGATIVE
BUN SERPL-MCNC: 12 MG/DL (ref 5–25)
CALCIUM SERPL-MCNC: 8.4 MG/DL (ref 8.3–10.1)
CHLORIDE SERPL-SCNC: 98 MMOL/L (ref 100–108)
CLARITY UR: CLEAR
CO2 SERPL-SCNC: 20 MMOL/L (ref 21–32)
COLOR UR: YELLOW
CREAT SERPL-MCNC: 1.03 MG/DL (ref 0.6–1.3)
ERYTHROCYTE [DISTWIDTH] IN BLOOD BY AUTOMATED COUNT: 12.9 % (ref 11.6–15.1)
GFR SERPL CREATININE-BSD FRML MDRD: 72 ML/MIN/1.73SQ M
GLUCOSE SERPL-MCNC: 101 MG/DL (ref 65–140)
GLUCOSE UR STRIP-MCNC: NEGATIVE MG/DL
HCT VFR BLD AUTO: 36.6 % (ref 36.5–49.3)
HGB BLD-MCNC: 12.4 G/DL (ref 12–17)
HGB UR QL STRIP.AUTO: NEGATIVE
KETONES UR STRIP-MCNC: ABNORMAL MG/DL
LEUKOCYTE ESTERASE UR QL STRIP: NEGATIVE
MCH RBC QN AUTO: 30.8 PG (ref 26.8–34.3)
MCHC RBC AUTO-ENTMCNC: 33.9 G/DL (ref 31.4–37.4)
MCV RBC AUTO: 91 FL (ref 82–98)
NITRITE UR QL STRIP: NEGATIVE
P AXIS: 31 DEGREES
PH UR STRIP.AUTO: 5.5 [PH]
PLATELET # BLD AUTO: 202 THOUSANDS/UL (ref 149–390)
PMV BLD AUTO: 10.1 FL (ref 8.9–12.7)
POTASSIUM SERPL-SCNC: 4.5 MMOL/L (ref 3.5–5.3)
PR INTERVAL: 154 MS
PROT UR STRIP-MCNC: NEGATIVE MG/DL
QRS AXIS: 23 DEGREES
QRSD INTERVAL: 86 MS
QT INTERVAL: 338 MS
QTC INTERVAL: 457 MS
RBC # BLD AUTO: 4.03 MILLION/UL (ref 3.88–5.62)
SODIUM SERPL-SCNC: 131 MMOL/L (ref 136–145)
SP GR UR STRIP.AUTO: 1.02 (ref 1–1.03)
T WAVE AXIS: -28 DEGREES
UROBILINOGEN UR QL STRIP.AUTO: 0.2 E.U./DL
VENTRICULAR RATE: 110 BPM
WBC # BLD AUTO: 10.06 THOUSAND/UL (ref 4.31–10.16)

## 2021-08-29 PROCEDURE — 99232 SBSQ HOSP IP/OBS MODERATE 35: CPT | Performed by: INTERNAL MEDICINE

## 2021-08-29 PROCEDURE — 93010 ELECTROCARDIOGRAM REPORT: CPT | Performed by: INTERNAL MEDICINE

## 2021-08-29 PROCEDURE — 93005 ELECTROCARDIOGRAM TRACING: CPT

## 2021-08-29 PROCEDURE — 99222 1ST HOSP IP/OBS MODERATE 55: CPT | Performed by: PHYSICIAN ASSISTANT

## 2021-08-29 PROCEDURE — 81003 URINALYSIS AUTO W/O SCOPE: CPT | Performed by: ORTHOPAEDIC SURGERY

## 2021-08-29 PROCEDURE — 99024 POSTOP FOLLOW-UP VISIT: CPT | Performed by: PHYSICIAN ASSISTANT

## 2021-08-29 PROCEDURE — 85027 COMPLETE CBC AUTOMATED: CPT | Performed by: ORTHOPAEDIC SURGERY

## 2021-08-29 PROCEDURE — 80048 BASIC METABOLIC PNL TOTAL CA: CPT | Performed by: ORTHOPAEDIC SURGERY

## 2021-08-29 RX ORDER — CHLORDIAZEPOXIDE HYDROCHLORIDE 5 MG/1
5 CAPSULE, GELATIN COATED ORAL 3 TIMES DAILY
Status: DISCONTINUED | OUTPATIENT
Start: 2021-08-29 | End: 2021-08-30

## 2021-08-29 RX ORDER — TAMSULOSIN HYDROCHLORIDE 0.4 MG/1
0.4 CAPSULE ORAL
Status: DISCONTINUED | OUTPATIENT
Start: 2021-08-29 | End: 2021-08-31 | Stop reason: HOSPADM

## 2021-08-29 RX ORDER — HYDRALAZINE HYDROCHLORIDE 20 MG/ML
5 INJECTION INTRAMUSCULAR; INTRAVENOUS EVERY 6 HOURS PRN
Status: DISCONTINUED | OUTPATIENT
Start: 2021-08-29 | End: 2021-08-31 | Stop reason: HOSPADM

## 2021-08-29 RX ORDER — CHLORDIAZEPOXIDE HYDROCHLORIDE 5 MG/1
5 CAPSULE, GELATIN COATED ORAL EVERY 6 HOURS PRN
Status: DISCONTINUED | OUTPATIENT
Start: 2021-08-29 | End: 2021-08-29

## 2021-08-29 RX ORDER — CEFAZOLIN SODIUM 2 G/50ML
2000 SOLUTION INTRAVENOUS EVERY 8 HOURS
Status: COMPLETED | OUTPATIENT
Start: 2021-08-29 | End: 2021-08-30

## 2021-08-29 RX ADMIN — TAMSULOSIN HYDROCHLORIDE 0.4 MG: 0.4 CAPSULE ORAL at 17:30

## 2021-08-29 RX ADMIN — ACETAMINOPHEN 650 MG: 325 TABLET, FILM COATED ORAL at 17:29

## 2021-08-29 RX ADMIN — ASPIRIN 81 MG: 81 TABLET, COATED ORAL at 09:29

## 2021-08-29 RX ADMIN — PANTOPRAZOLE SODIUM 40 MG: 40 TABLET, DELAYED RELEASE ORAL at 06:05

## 2021-08-29 RX ADMIN — CHLORDIAZEPOXIDE HYDROCHLORIDE 5 MG: 5 CAPSULE ORAL at 21:49

## 2021-08-29 RX ADMIN — PRAVASTATIN SODIUM 20 MG: 20 TABLET ORAL at 09:29

## 2021-08-29 RX ADMIN — HYDRALAZINE HYDROCHLORIDE 5 MG: 20 INJECTION INTRAMUSCULAR; INTRAVENOUS at 02:26

## 2021-08-29 RX ADMIN — OXYCODONE HYDROCHLORIDE 5 MG: 5 TABLET ORAL at 12:03

## 2021-08-29 RX ADMIN — ACETAMINOPHEN 650 MG: 325 TABLET, FILM COATED ORAL at 12:03

## 2021-08-29 RX ADMIN — ZOLPIDEM TARTRATE 5 MG: 5 TABLET ORAL at 23:38

## 2021-08-29 RX ADMIN — OXYCODONE HYDROCHLORIDE 5 MG: 5 TABLET ORAL at 21:48

## 2021-08-29 RX ADMIN — CEFAZOLIN SODIUM 2000 MG: 2 SOLUTION INTRAVENOUS at 09:29

## 2021-08-29 RX ADMIN — FUROSEMIDE 40 MG: 40 TABLET ORAL at 09:29

## 2021-08-29 RX ADMIN — ENOXAPARIN SODIUM 40 MG: 100 INJECTION SUBCUTANEOUS at 09:30

## 2021-08-29 RX ADMIN — METOPROLOL SUCCINATE 50 MG: 50 TABLET, EXTENDED RELEASE ORAL at 09:29

## 2021-08-29 RX ADMIN — SODIUM CHLORIDE, SODIUM GLUCONATE, SODIUM ACETATE, POTASSIUM CHLORIDE, MAGNESIUM CHLORIDE, SODIUM PHOSPHATE, DIBASIC, AND POTASSIUM PHOSPHATE 75 ML/HR: .53; .5; .37; .037; .03; .012; .00082 INJECTION, SOLUTION INTRAVENOUS at 01:49

## 2021-08-29 RX ADMIN — THIAMINE HCL TAB 100 MG 100 MG: 100 TAB at 09:30

## 2021-08-29 RX ADMIN — OXYCODONE HYDROCHLORIDE 5 MG: 5 TABLET ORAL at 17:30

## 2021-08-29 RX ADMIN — HYDROMORPHONE HYDROCHLORIDE 0.2 MG: 0.2 INJECTION, SOLUTION INTRAMUSCULAR; INTRAVENOUS; SUBCUTANEOUS at 02:34

## 2021-08-29 RX ADMIN — CHLORDIAZEPOXIDE HYDROCHLORIDE 5 MG: 5 CAPSULE ORAL at 17:30

## 2021-08-29 RX ADMIN — METOPROLOL SUCCINATE 50 MG: 50 TABLET, EXTENDED RELEASE ORAL at 17:30

## 2021-08-29 RX ADMIN — OXYCODONE HYDROCHLORIDE 5 MG: 5 TABLET ORAL at 06:10

## 2021-08-29 RX ADMIN — CEFAZOLIN SODIUM 2000 MG: 2 SOLUTION INTRAVENOUS at 17:30

## 2021-08-29 RX ADMIN — FOLIC ACID 1 MG: 1 TABLET ORAL at 09:29

## 2021-08-29 NOTE — PROGRESS NOTES
Progress Note - Orthopedics   Arianne Mustafa 66 y o  male MRN: [id number]  Unit/Bed#: -01 Encounter: [id number]    Assessment:  66year old Male POD1 s/p Right hip TFN for intertrochanteric hip frcature    Plan:  -WBAT RLE  -PT/OT  -Pain control PRN  -Medical management per primary team  -CBC reviewed with morning, WBC 10 06, Hgb 12 4  -Vitals reviewed, patient was tachycardiac and hypertensive overnight  -Mild drainage visualized on proximal dressing  -2 doses of post-operative ancef was ordered  -Patient receiving ASA, Lovenox, and SCD for DVT ppx  -Patient is experiencing urinary retention post-op, he was straight cathed this morning and they obtained 1200 units  He is on urinary retention protocol  Patient will be started in flomax  -Follow-up with Dr Álvarez in 2 weeks post-op  -Ortho will continue to follow      Subjective:  75-year-old male postop day 1 status post right hip TFN for intertrochanteric hip fracture  Patient seen examined at bedside  Patient states he is overall doing well  He denies having any fevers chills sweats  He denies feeling ill  Patient is experiencing postoperative urinary retention  Patient states that he did have to be straight cathed this morning due to not being able to urinate  He does note having right hip leg and pain  Patient notes that he has not been up and ambulated yet this morning  Patient believes he is stiff due to being the last hours  He does states that this pain is better compared to yesterday  Patient was unable to tolerate external rotation of the right foot yesterday but is able to perform today with minimal pain  Vitals: Blood pressure 151/88, pulse (!) 106, temperature 99 4 °F (37 4 °C), resp  rate 17, height 6' 5" (1 956 m), weight 111 kg (244 lb 11 4 oz), SpO2 93 %  ,Body mass index is 29 02 kg/m²        Intake/Output Summary (Last 24 hours) at 8/29/2021 0914  Last data filed at 8/29/2021 8932  Gross per 24 hour   Intake 850 ml   Output 1300 ml Net -450 ml       Invasive Devices     Peripheral Intravenous Line            Peripheral IV 08/28/21 Right Antecubital 1 day    Peripheral IV 08/28/21 Right Hand <1 day                Physical Exam: General appearance: alert and oriented, in no acute distress  Head: Normocephalic, without obvious abnormality, atraumatic  Eyes: conjunctivae/corneas clear  PERRL, EOM's intact  Fundi benign  Lungs: No stridor or wheezing  Heart: No apparent distress  Ortho Exam:   Right Lower Extremity Exam  Alignment:  Leg lengths appear equal   Inspection:  Dressings present on the lateral aspect of the right hip  There is mild drainage present on the most proximal dressing  There is no erythema or ecchymosis about the lateral aspect of the hip  Palpation:  Tenderness to palpation sasha incisionally  Compartments are soft and compressible  ROM:  Not tested  Strength:  EHL 5/5  FHL 5/5  Stability:  Not tested  Neurovascular:  Sensation intact in DP/SP/Wilkinson/Sa/T nerve distributions  Palpable DP & PT pulses  Gait:  Not tested  Lab, Imaging and other studies:   I have personally reviewed pertinent lab results    CBC:   Lab Results   Component Value Date    WBC 10 06 08/29/2021    HGB 12 4 08/29/2021    HCT 36 6 08/29/2021    MCV 91 08/29/2021     08/29/2021    MCH 30 8 08/29/2021    MCHC 33 9 08/29/2021    RDW 12 9 08/29/2021    MPV 10 1 08/29/2021     CMP:   Lab Results   Component Value Date    SODIUM 131 (L) 08/29/2021    CL 98 (L) 08/29/2021    CO2 20 (L) 08/29/2021    BUN 12 08/29/2021    CREATININE 1 03 08/29/2021    CALCIUM 8 4 08/29/2021    EGFR 72 08/29/2021     [address], [place]

## 2021-08-29 NOTE — PLAN OF CARE
Problem: PAIN - ADULT  Goal: Verbalizes/displays adequate comfort level or baseline comfort level  Description: Interventions:  - Encourage patient to monitor pain and request assistance  - Assess pain using appropriate pain scale  - Administer analgesics based on type and severity of pain and evaluate response  - Implement non-pharmacological measures as appropriate and evaluate response  - Consider cultural and social influences on pain and pain management  - Notify physician/advanced practitioner if interventions unsuccessful or patient reports new pain  Outcome: Progressing     Problem: INFECTION - ADULT  Goal: Absence or prevention of progression during hospitalization  Description: INTERVENTIONS:  - Assess and monitor for signs and symptoms of infection  - Monitor lab/diagnostic results  - Monitor all insertion sites, i e  indwelling lines, tubes, and drains  - Monitor endotracheal if appropriate and nasal secretions for changes in amount and color  - Flora appropriate cooling/warming therapies per order  - Administer medications as ordered  - Instruct and encourage patient and family to use good hand hygiene technique  - Identify and instruct in appropriate isolation precautions for identified infection/condition  Outcome: Progressing  Goal: Absence of fever/infection during neutropenic period  Description: INTERVENTIONS:  - Monitor WBC    Outcome: Progressing     Problem: SAFETY ADULT  Goal: Patient will remain free of falls  Description: INTERVENTIONS:  - Educate patient/family on patient safety including physical limitations  - Instruct patient to call for assistance with activity   - Consult OT/PT to assist with strengthening/mobility   - Keep Call bell within reach  - Keep bed low and locked with side rails adjusted as appropriate  - Keep care items and personal belongings within reach  - Initiate and maintain comfort rounds  - Make Fall Risk Sign visible to staff  - Offer Toileting every 2 Hours, in advance of need  - Initiate/Maintain bed alarm  - Obtain necessary fall risk management equipment: alarm  - Apply yellow socks and bracelet for high fall risk patients  - Consider moving patient to room near nurses station  Outcome: Progressing  Goal: Maintain or return to baseline ADL function  Description: INTERVENTIONS:  -  Assess patient's ability to carry out ADLs; assess patient's baseline for ADL function and identify physical deficits which impact ability to perform ADLs (bathing, care of mouth/teeth, toileting, grooming, dressing, etc )  - Assess/evaluate cause of self-care deficits   - Assess range of motion  - Assess patient's mobility; develop plan if impaired  - Assess patient's need for assistive devices and provide as appropriate  - Encourage maximum independence but intervene and supervise when necessary  - Involve family in performance of ADLs  - Assess for home care needs following discharge   - Consider OT consult to assist with ADL evaluation and planning for discharge  - Provide patient education as appropriate  Outcome: Progressing  Goal: Maintains/Returns to pre admission functional level  Description: INTERVENTIONS:  - Perform BMAT or MOVE assessment daily    - Set and communicate daily mobility goal to care team and patient/family/caregiver  - Collaborate with rehabilitation services on mobility goals if consulted  - Perform Range of Motion 3 times a day  - Reposition patient every 2 hours    - Dangle patient 3 times a day  - Stand patient 3 times a day  - Ambulate patient 3 times a day  - Out of bed to chair 3 times a day   - Out of bed for meals 3 times a day  - Out of bed for toileting  - Record patient progress and toleration of activity level   Outcome: Progressing     Problem: DISCHARGE PLANNING  Goal: Discharge to home or other facility with appropriate resources  Description: INTERVENTIONS:  - Identify barriers to discharge w/patient and caregiver  - Arrange for needed discharge resources and transportation as appropriate  - Identify discharge learning needs (meds, wound care, etc )  - Arrange for interpretive services to assist at discharge as needed  - Refer to Case Management Department for coordinating discharge planning if the patient needs post-hospital services based on physician/advanced practitioner order or complex needs related to functional status, cognitive ability, or social support system  Outcome: Progressing

## 2021-08-29 NOTE — ASSESSMENT & PLAN NOTE
History of alcohol abuse, drinks 10 ounces of vodka daily  Blood alcohol level 235  Place on WA protocol  Librium 5mg TID  On thiamine, folate

## 2021-08-29 NOTE — ASSESSMENT & PLAN NOTE
· Atelectasis of left lung base, noted on preoperative chest x-ray, in the setting of patient with history of alcohol abuse, requiring use of incentive spirometry postoperatively  · Chest x-ray (08/28/2021)- Chronic moderate left pleural effusion with left basilar atelectasis    · Patient denies being an active smoker  · Continue with incentive spirometry  · Keep oxygen saturation greater than 92%, use supplemental oxygen as needed

## 2021-08-29 NOTE — ASSESSMENT & PLAN NOTE
· Right hip fracture, sustained after a fall after drinking heavily  ·  Right femur x-ray- right trochanteric femur fracture  · Status post right hip TFN (8/28/21) POD #1  · Postop care per Orthopedics  · DVT prophylaxis- Lovenox  · Pain control p r n   · P T /OT consulted

## 2021-08-29 NOTE — ASSESSMENT & PLAN NOTE
Sinus tachycardia, 's - stress induced, in the setting of alcohol abuse, right hip fracture s/p repair  Continue with home dose metoprolol  EKG - sinus tach

## 2021-08-29 NOTE — ASSESSMENT & PLAN NOTE
· Post op urinary retention, noted 8/28/21  · S/p straight cath 1200cc  · Started on flomax  · Urology on board  · On urinary retention protocol

## 2021-08-29 NOTE — PROGRESS NOTES
[place]  Progress Note - Bobbyromel Sheldon 1950, 66 y o  male MRN: [id number]  Unit/Bed#: -01 Encounter: [id number]  Primary Care Provider: Khanh Watkins MD   Date and time admitted to hospital: 8/28/2021  6:16 AM    * Closed fracture of right hip [facility])  Assessment & Plan    · Right hip fracture, sustained after a fall after drinking heavily  ·  Right femur x-ray- right trochanteric femur fracture  · Status post right hip TFN (8/28/21) POD #1  · Postop care per Orthopedics  · DVT prophylaxis- Lovenox  · Pain control p r n   · P T /OT consulted    Atelectasis of left lung  Assessment & Plan  · Atelectasis of left lung base, noted on preoperative chest x-ray, in the setting of patient with history of alcohol abuse, requiring use of incentive spirometry postoperatively  · Chest x-ray (08/28/2021)- Chronic moderate left pleural effusion with left basilar atelectasis    · Patient denies being an active smoker  · Continue with incentive spirometry  · Keep oxygen saturation greater than 92%, use supplemental oxygen as needed    Alcohol abuse  Assessment & Plan  History of alcohol abuse, drinks 10 ounces of vodka daily  Blood alcohol level 235  Place on CIWA protocol  Librium 5mg TID  On thiamine, folate      Acute urinary retention  Assessment & Plan  · Post op urinary retention, noted 8/28/21  · S/p straight cath 1200cc  · Started on flomax  · Urology on board  · On urinary retention protocol    Sinus tachycardia  Assessment & Plan  Sinus tachycardia, 's - stress induced, in the setting of alcohol abuse, right hip fracture s/p repair  Continue with home dose metoprolol  EKG - sinus tach    Ambulatory dysfunction  Assessment & Plan  H/o ambulatory dysfunction, falls exacerbated by alcohol abuse  Pt/OT    Paroxysmal atrial fibrillation (HCC)  Assessment & Plan  On metoprolol 50 mg b i d , not on anticoagulation    S/P CABG (coronary artery bypass graft)  Assessment & Plan  S/p CABG 2014 [place] to [place]    Essential hypertension  Assessment & Plan  on Lasix 20 mg daily and metoprolol 50 mg b i d     Gastroesophageal reflux disease without esophagitis  Assessment & Plan  On pantoprazole 40 mg daily    Hypercholesteremia  Assessment & Plan  On pravastatin    Other insomnia  Assessment & Plan  On ambien      VTE Pharmacologic Prophylaxis:   Pharmacologic: Enoxaparin (Lovenox)  Mechanical VTE Prophylaxis in Place: Yes    Patient Centered Rounds: I have performed bedside rounds with nursing staff today  Discussions with Specialists or Other Care Team Provider:     Education and Discussions with Family / Patient: patient's daughter Jose Roberto Grimes by bedside    Time Spent for Care: 30 minutes  More than 50% of total time spent on counseling and coordination of care as described above  Current Length of Stay: 1 day(s)    Current Patient Status: Inpatient   Certification Statement: The patient will continue to require additional inpatient hospital stay due to as above    Discharge Plan: pending PT eval    Code Status: Level 1 - Full Code      Subjective:   Overnight, patient was tachycardic, hypertensive and was noted to be in acute urinary retention, His vital improved after catheterization, 1200ccs removed  This morning he is complaining of right hip pain,stiffness- as expected post-op    Objective:     Vitals:   Temp (24hrs), Av 9 °F (37 2 °C), Min:97 8 °F (36 6 °C), Max:99 5 °F (37 5 °C)    Temp:  [97 8 °F (36 6 °C)-99 5 °F (37 5 °C)] 99 4 °F (37 4 °C)  HR:  [] 106  Resp:  [11-22] 17  BP: (110-189)/() 151/88  SpO2:  [89 %-98 %] 93 %  Body mass index is 29 02 kg/m²  Input and Output Summary (last 24 hours): Intake/Output Summary (Last 24 hours) at 2021 1247  Last data filed at 2021 0620  Gross per 24 hour   Intake 600 ml   Output 1300 ml   Net -700 ml       Physical Exam:     Physical Exam  Vitals and nursing note reviewed     Constitutional:       General: He is not in acute distress  Appearance: Normal appearance  He is not ill-appearing, toxic-appearing or diaphoretic  Cardiovascular:      Rate and Rhythm: Normal rate and regular rhythm  Pulses: Normal pulses  Heart sounds: No murmur heard  No gallop  Pulmonary:      Effort: Pulmonary effort is normal  No respiratory distress  Breath sounds: Normal breath sounds  No stridor  No wheezing, rhonchi or rales  Chest:      Chest wall: No tenderness  Abdominal:      General: Bowel sounds are normal  There is no distension  Palpations: Abdomen is soft  Tenderness: There is no abdominal tenderness  There is no right CVA tenderness, left CVA tenderness or guarding  Musculoskeletal:         General: No swelling, deformity or signs of injury  Right hip: Tenderness present  Decreased range of motion  Right lower leg: No edema  Left lower leg: No edema  Skin:     General: Skin is warm and dry  Capillary Refill: Capillary refill takes less than 2 seconds  Coloration: Skin is not jaundiced or pale  Findings: No bruising, erythema, lesion or rash  Neurological:      General: No focal deficit present  Mental Status: He is alert and oriented to person, place, and time  Mental status is at baseline  Cranial Nerves: No cranial nerve deficit  Psychiatric:         Mood and Affect: Mood normal          Thought Content:  Thought content normal          Judgment: Judgment normal        Additional Data:     Labs:    Results from last 7 days   Lab Units 08/29/21  0538 08/28/21  0620   WBC Thousand/uL 10 06 10 63*   HEMOGLOBIN g/dL 12 4 13 8   HEMATOCRIT % 36 6 41 4   PLATELETS Thousands/uL 202 249   NEUTROS PCT %  --  74   LYMPHS PCT %  --  13*   MONOS PCT %  --  10   EOS PCT %  --  1     Results from last 7 days   Lab Units 08/29/21  0538 08/28/21  0620   SODIUM mmol/L 131* 132*   POTASSIUM mmol/L 4 5 4 5   CHLORIDE mmol/L 98* 98*   CO2 mmol/L 20* 24   BUN mg/dL 12 14   CREATININE mg/dL 1 03 1 10   ANION GAP mmol/L 13 10   CALCIUM mg/dL 8 4 8 3   ALBUMIN g/dL  --  3 4*   TOTAL BILIRUBIN mg/dL  --  0 60   ALK PHOS U/L  --  58   ALT U/L  --  31   AST U/L  --  33   GLUCOSE RANDOM mg/dL 101 83     Results from last 7 days   Lab Units 08/28/21  0620   INR  1 14                       * I Have Reviewed All Lab Data Listed Above  * Additional Pertinent Lab Tests Reviewed:  All Labs Within Last 24 Hours Reviewed    Imaging:    Imaging Reports Reviewed Today Include:   Imaging Personally Reviewed by Myself Includes:      Recent Cultures (last 7 days):           Last 24 Hours Medication List:   Current Facility-Administered Medications   Medication Dose Route Frequency Provider Last Rate    acetaminophen  650 mg Oral Q6H PRN Shania Duffy MD      aspirin  81 mg Oral Daily Jeremy Darnell MD      cefazolin  2,000 mg Intravenous Jayden Pavon [address], PA-C 2,000 mg (08/29/21 0981)    chlordiazePOXIDE  5 mg Oral TID Jeremy Darnell MD      enoxaparin  40 mg Subcutaneous Daily Jeremy Darnell MD      folic acid  1 mg Oral Daily Jeremy Darnell MD      furosemide  40 mg Oral Daily Jeremy Darnell MD      hydrALAZINE  5 mg Intravenous Q6H PRN Floyd Mina PA-C      HYDROmorphone  0 2 mg Intravenous Q4H PRN Shania Duffy MD      metoprolol succinate  50 mg Oral BID Jeremy Darnell MD      ondansetron  4 mg Intravenous Q4H PRN Shania Duffy MD      oxyCODONE  2 5 mg Oral Q4H PRN Shania Duffy MD      oxyCODONE  5 mg Oral Q4H PRN Shania Duffy MD      pantoprazole  40 mg Oral Early Morning Jeremy Darnell MD      pravastatin  20 mg Oral Daily Jeremy Darnell MD      senna-docusate sodium  1 tablet Oral BID PRN Shania Duffy MD      tamsulosin  0 4 mg Oral Daily With [employer] EDWIGE Burton      thiamine  100 mg Oral Daily Jeremy Darnell MD      zolpidem  5 mg Oral HS PRN Jennifer Davis MD          Today, Patient Was Seen By: Jennifer Davis MD    ** Please Note: Dictation voice to text software may have been used in the creation of this document   **

## 2021-08-29 NOTE — CONSULTS
Assessment   66 y o  male with admitted with closed fracture of right hip  Postop day 1 status post ORIF right hip  Patient now with urinary retention status post straight catheterization  Consulted for evaluation  Post-operative urinary retention due to post-operative state, immobility,  possible history of BPH  -continue urinary retention protocol, place dean if indicated  -if dean placed, can repeat voiding trial wound patient is ambulatory  -encourage out of bed, ambulation with PT, follow ortho restrictions  -will start Flomax  -add PSA  -UA ordered, pending    Intertrochanteric right hip fracture  -POD#1 s/p  Right hip TFN  -management per orthopedics    History of alcohol abuse  -ETOH to 35 on admit  -patient on CIWA protocol    Medical comorbidities  -GERD, CAD, PAF  -continue medical management      Subjective   Erick Garduno is a 66 y o  male who was admitted for Alcohol intoxication ([facility] [address] ) [F10 929]  Fall, initial encounter [W19  XXXA]  Closed right hip fracture, initial encounter ([facility] ) Daniel Saenz  Patient was referred by medicine for evaluation and treatment of urinary retention that began 1 day ago after surgical intervention  Patient currently does not have a urinary catheter in place  He is following retention protocol and has been straight cathed for 1200 ml urine output this morning  Admits to prior hesitancy and weak stream   States he has been told by his family medical doctor that he has a mildly enlarged prostate  Denies any history of UTIs  No history of hematuria  Recent medications that may have affected voiding include anesthesia related to recent surgery  Denies constipation    Outside records reviewed  Pertinent radiology and labs reviewed      Review of Systems  Review of Systems - General ROS: negative for - chills, fever or weight loss  ENT ROS: negative for - headaches or visual changes  Respiratory ROS: negative for - cough, shortness of breath or wheezing  Cardiovascular ROS: negative for - chest pain, dyspnea on exertion or palpitations  Gastrointestinal ROS: negative for - abdominal pain, constipation, diarrhea or nausea/vomiting  Genito-Urinary ROS: as per HPI  Musculoskeletal ROS: positive for - gait disturbance and right hip fracture  Neurological ROS: no TIA or stroke symptoms  Dermatological ROS: negative  negative for rash and lesions, or open wounds         Objective   Physicial Exam  /88   Pulse (!) 106   Temp 99 4 °F (37 4 °C)   Resp 17   Ht 6' 5" (1 956 m)   Wt 111 kg (244 lb 11 4 oz)   SpO2 93%   BMI 29 02 kg/m²     General Appearance:    Alert, cooperative, no distress, appears stated age   Head:    Normocephalic, without obvious abnormality, atraumatic   Eyes:    Conjunctiva/corneas clear, EOM's intact           Throat:  MMM   Neck:   Supple, symmetrical, trachea midline, no adenopathy; no JVD       Lungs:     Clear to auscultation bilaterally, respirations unlabored   Chest wall:    No tenderness or deformity   Heart:    Regular rate and rhythm, S1 and S2 normal, no murmur, rub   or gallop   Abdomen:     Soft, non-tender, bowel sounds active all four quadrants   Genitalia:    Normal male without lesion, discharge or tenderness       Extremities:   no cyanosis or edema, Right hip dsg clean and intact       Skin:   Skin color, texture, turgor normal, no rashes or lesions         Neurologic:   CNII-XII intact   Normal strength, sensation       Imaging  none    Labs  Urine Microscopy:       Invalid input(s): LABCAST, CRYST, Urinalysis:   , Urine Culture:   , BMP:   Results from last 7 days   Lab Units 08/29/21  0538 08/28/21  0620   POTASSIUM mmol/L 4 5 4 5   CALCIUM mg/dL 8 4 8 3   CHLORIDE mmol/L 98* 98*   CO2 mmol/L 20* 24   BUN mg/dL 12 14   CREATININE mg/dL 1 03 1 10   ALK PHOS U/L  --  58   , CBC:   Results from last 7 days   Lab Units 08/29/21  0538   WBC Thousand/uL 10 06   RBC Million/uL 4 03   HEMOGLOBIN g/dL 12 4 HEMATOCRIT % 36 6   PLATELETS Thousands/uL 202    and PSA:        Edson Burton PA-C

## 2021-08-30 LAB
ANION GAP SERPL CALCULATED.3IONS-SCNC: 8 MMOL/L (ref 4–13)
BUN SERPL-MCNC: 13 MG/DL (ref 5–25)
CALCIUM SERPL-MCNC: 8.4 MG/DL (ref 8.3–10.1)
CHLORIDE SERPL-SCNC: 99 MMOL/L (ref 100–108)
CO2 SERPL-SCNC: 24 MMOL/L (ref 21–32)
CREAT SERPL-MCNC: 1.12 MG/DL (ref 0.6–1.3)
ERYTHROCYTE [DISTWIDTH] IN BLOOD BY AUTOMATED COUNT: 13.1 % (ref 11.6–15.1)
GFR SERPL CREATININE-BSD FRML MDRD: 65 ML/MIN/1.73SQ M
GLUCOSE SERPL-MCNC: 102 MG/DL (ref 65–140)
HCT VFR BLD AUTO: 35.3 % (ref 36.5–49.3)
HGB BLD-MCNC: 11.4 G/DL (ref 12–17)
MCH RBC QN AUTO: 30.5 PG (ref 26.8–34.3)
MCHC RBC AUTO-ENTMCNC: 32.3 G/DL (ref 31.4–37.4)
MCV RBC AUTO: 94 FL (ref 82–98)
PLATELET # BLD AUTO: 188 THOUSANDS/UL (ref 149–390)
PMV BLD AUTO: 10.5 FL (ref 8.9–12.7)
POTASSIUM SERPL-SCNC: 3.9 MMOL/L (ref 3.5–5.3)
PSA SERPL-MCNC: 1.3 NG/ML (ref 0–4)
RBC # BLD AUTO: 3.74 MILLION/UL (ref 3.88–5.62)
SODIUM SERPL-SCNC: 131 MMOL/L (ref 136–145)
WBC # BLD AUTO: 10.24 THOUSAND/UL (ref 4.31–10.16)

## 2021-08-30 PROCEDURE — 99024 POSTOP FOLLOW-UP VISIT: CPT | Performed by: PHYSICIAN ASSISTANT

## 2021-08-30 PROCEDURE — 97167 OT EVAL HIGH COMPLEX 60 MIN: CPT

## 2021-08-30 PROCEDURE — 97530 THERAPEUTIC ACTIVITIES: CPT

## 2021-08-30 PROCEDURE — 85027 COMPLETE CBC AUTOMATED: CPT | Performed by: INTERNAL MEDICINE

## 2021-08-30 PROCEDURE — 80048 BASIC METABOLIC PNL TOTAL CA: CPT | Performed by: INTERNAL MEDICINE

## 2021-08-30 PROCEDURE — 99232 SBSQ HOSP IP/OBS MODERATE 35: CPT | Performed by: PHYSICIAN ASSISTANT

## 2021-08-30 PROCEDURE — 84153 ASSAY OF PSA TOTAL: CPT | Performed by: PHYSICIAN ASSISTANT

## 2021-08-30 PROCEDURE — 97163 PT EVAL HIGH COMPLEX 45 MIN: CPT

## 2021-08-30 RX ORDER — POLYETHYLENE GLYCOL 3350 17 G/17G
17 POWDER, FOR SOLUTION ORAL DAILY
Status: DISCONTINUED | OUTPATIENT
Start: 2021-08-30 | End: 2021-08-31 | Stop reason: HOSPADM

## 2021-08-30 RX ORDER — OXYCODONE HYDROCHLORIDE 5 MG/1
10 TABLET ORAL EVERY 4 HOURS PRN
Status: DISCONTINUED | OUTPATIENT
Start: 2021-08-30 | End: 2021-08-31 | Stop reason: HOSPADM

## 2021-08-30 RX ORDER — HYDROMORPHONE HCL/PF 1 MG/ML
0.5 SYRINGE (ML) INJECTION EVERY 4 HOURS PRN
Status: DISCONTINUED | OUTPATIENT
Start: 2021-08-30 | End: 2021-08-31 | Stop reason: HOSPADM

## 2021-08-30 RX ORDER — OXYCODONE HYDROCHLORIDE 5 MG/1
5 TABLET ORAL EVERY 4 HOURS PRN
Status: DISCONTINUED | OUTPATIENT
Start: 2021-08-30 | End: 2021-08-31 | Stop reason: HOSPADM

## 2021-08-30 RX ADMIN — TAMSULOSIN HYDROCHLORIDE 0.4 MG: 0.4 CAPSULE ORAL at 17:46

## 2021-08-30 RX ADMIN — HYDROMORPHONE HYDROCHLORIDE 0.5 MG: 1 INJECTION, SOLUTION INTRAMUSCULAR; INTRAVENOUS; SUBCUTANEOUS at 22:57

## 2021-08-30 RX ADMIN — METOPROLOL SUCCINATE 50 MG: 50 TABLET, EXTENDED RELEASE ORAL at 08:36

## 2021-08-30 RX ADMIN — FOLIC ACID 1 MG: 1 TABLET ORAL at 08:36

## 2021-08-30 RX ADMIN — CHLORDIAZEPOXIDE HYDROCHLORIDE 5 MG: 5 CAPSULE ORAL at 08:36

## 2021-08-30 RX ADMIN — ENOXAPARIN SODIUM 40 MG: 100 INJECTION SUBCUTANEOUS at 08:37

## 2021-08-30 RX ADMIN — THIAMINE HCL TAB 100 MG 100 MG: 100 TAB at 08:37

## 2021-08-30 RX ADMIN — ASPIRIN 81 MG: 81 TABLET, COATED ORAL at 08:37

## 2021-08-30 RX ADMIN — FUROSEMIDE 40 MG: 40 TABLET ORAL at 08:37

## 2021-08-30 RX ADMIN — OXYCODONE HYDROCHLORIDE 10 MG: 5 TABLET ORAL at 14:21

## 2021-08-30 RX ADMIN — POLYETHYLENE GLYCOL 3350 17 G: 17 POWDER, FOR SOLUTION ORAL at 11:23

## 2021-08-30 RX ADMIN — HYDROMORPHONE HYDROCHLORIDE 0.2 MG: 0.2 INJECTION, SOLUTION INTRAMUSCULAR; INTRAVENOUS; SUBCUTANEOUS at 10:02

## 2021-08-30 RX ADMIN — OXYCODONE HYDROCHLORIDE 5 MG: 5 TABLET ORAL at 09:06

## 2021-08-30 RX ADMIN — PANTOPRAZOLE SODIUM 40 MG: 40 TABLET, DELAYED RELEASE ORAL at 06:38

## 2021-08-30 RX ADMIN — PRAVASTATIN SODIUM 20 MG: 20 TABLET ORAL at 08:36

## 2021-08-30 RX ADMIN — OXYCODONE HYDROCHLORIDE 5 MG: 5 TABLET ORAL at 03:52

## 2021-08-30 RX ADMIN — OXYCODONE HYDROCHLORIDE 10 MG: 5 TABLET ORAL at 22:01

## 2021-08-30 NOTE — ASSESSMENT & PLAN NOTE
Sinus tachycardia, 's - likely secondary to pain  Continue with home dose metoprolol  EKG - sinus tach  Increase pain medication for better pain control

## 2021-08-30 NOTE — PROGRESS NOTES
Chase Lawson  66 y o   male  MR#: [id number]  8/30/2021    Post-op days: 2  Extremity: right hip    Subjective: Patient seen and examined  Lying comfortably in bed  No issues overnight  Pain is controlled  Patient is eager to get out of bed today  Denies chest pain, SOB, fever or chills  He was having urinary retention, but he states he has recently urinated on his own  Vitals:   Vitals:    08/30/21 0713   BP: (!) 159/109   Pulse: (!) 110   Resp: 18   Temp: 98 1 °F (36 7 °C)   SpO2: 94%       Exam:   A&O x 3 NAD  Right hip:  Dressings intact  Dressings taken down: Healing and intact incisions with staples intact  No active drainage  Thigh swollen but compressible  Calf soft, nontender  NV intact    Labs:   WBC   Recent Labs     08/28/21  0620 08/29/21  0538 08/30/21  0502   WBC 10 63* 10 06 10 24*     H/H   Recent Labs     08/28/21  0620 08/29/21  0538 08/30/21  0502   HGB 13 8 12 4 11 4*   /  Recent Labs     08/28/21  0620 08/29/21  0538 08/30/21  0502   HCT 41 4 36 6 35 3*     Sed Rate No results for input(s): SEDRATE in the last 72 hours  CRP No results for input(s): CRP in the last 72 hours  Assessment:   S/p right hip TFN    Plan:   WBAT to RLE with assistance  PT/OT  Pain control  DVT prophylaxis: lovenox, mechanical  ABLA: stable  Continue to monitor vitals and H/H   DC planning  Dressings changed today

## 2021-08-30 NOTE — ASSESSMENT & PLAN NOTE
· Post op urinary retention, noted 8/28/21 - now resolved  · S/p straight cath 1200cc  · Started on flomax  · Urology on board  · On urinary retention protocol

## 2021-08-30 NOTE — PROGRESS NOTES
[place]  Progress Note - Gabino Brochure 1950, 66 y o  male MRN: [id number]  Unit/Bed#: [facility] Encounter: [id number]  Primary Care Provider: Hemal Cook MD   Date and time admitted to hospital: 8/28/2021  6:16 AM    * Closed fracture of right hip [facility])  Assessment & Plan    · Right hip fracture, sustained after a fall after drinking heavily  ·  Right femur x-ray- right trochanteric femur fracture  · Status post right hip TFN (8/28/21) POD #2  · Postop care per Orthopedics  · DVT prophylaxis- Lovenox  · Pain control p r n   · P T /OT consulted - STR    Alcohol abuse  Assessment & Plan  History of alcohol abuse, drinks 10 ounces of vodka daily  Blood alcohol level 235 on admission  Continue CIWA protocol  Librium 5mg TID - will discontinue as patient is not showing signs of withdrawal at this time   On thiamine, folate      Acute urinary retention  Assessment & Plan  · Post op urinary retention, noted 8/28/21 - now resolved  · S/p straight cath 1200cc  · Started on flomax  · Urology on board  · On urinary retention protocol    Sinus tachycardia  Assessment & Plan  Sinus tachycardia, 's - likely secondary to pain  Continue with home dose metoprolol  EKG - sinus tach  Increase pain medication for better pain control    Atelectasis of left lung  Assessment & Plan  · Atelectasis of left lung base, noted on preoperative chest x-ray, in the setting of patient with history of alcohol abuse, requiring use of incentive spirometry postoperatively  · Chest x-ray (08/28/2021)- Chronic moderate left pleural effusion with left basilar atelectasis    · Patient denies being an active smoker  · Continue with incentive spirometry  · Keep oxygen saturation greater than 92%, use supplemental oxygen as needed    Paroxysmal atrial fibrillation (HCC)  Assessment & Plan  On metoprolol 50 mg b i d , not on anticoagulation    Ambulatory dysfunction  Assessment & Plan  H/o ambulatory dysfunction, falls exacerbated by alcohol abuse  Pt/OT - STR          VTE Pharmacologic Prophylaxis: VTE Score: 8 High Risk (Score >/= 5) - Pharmacological DVT Prophylaxis Ordered: enoxaparin (Lovenox)  Sequential Compression Devices Ordered  Patient Centered Rounds: I performed bedside rounds with nursing staff today  Discussions with Specialists or Other Care Team Provider: case management    Education and Discussions with Family / Patient: Updated  (wife) via phone  Time Spent for Care: 30 minutes  More than 50% of total time spent on counseling and coordination of care as described above  Current Length of Stay: 2 day(s)  Current Patient Status: Inpatient   Certification Statement: The patient will continue to require additional inpatient hospital stay due to rehab placement  Discharge Plan: Anticipate discharge tomorrow to rehab facility  Code Status: Level 1 - Full Code    Subjective:   Complained of leg pain this morning  Significant with physical therapy  Also complaining of some constipation    Objective:     Vitals:   Temp (24hrs), Av 9 °F (36 6 °C), Min:97 8 °F (36 6 °C), Max:98 1 °F (36 7 °C)    Temp:  [97 8 °F (36 6 °C)-98 1 °F (36 7 °C)] 98 1 °F (36 7 °C)  HR:  [] 110  Resp:  [17-19] 18  BP: (121-166)/() 159/109  SpO2:  [93 %-95 %] 94 %  Body mass index is 29 02 kg/m²  Input and Output Summary (last 24 hours): Intake/Output Summary (Last 24 hours) at 2021 1427  Last data filed at 2021 1347  Gross per 24 hour   Intake 360 ml   Output 1575 ml   Net -1215 ml       Physical Exam:   Physical Exam  Vitals and nursing note reviewed  Constitutional:       Appearance: He is well-developed  HENT:      Head: Normocephalic and atraumatic  Eyes:      Conjunctiva/sclera: Conjunctivae normal    Cardiovascular:      Rate and Rhythm: Normal rate and regular rhythm  Heart sounds: No murmur heard       Pulmonary:      Effort: Pulmonary effort is normal  No respiratory distress  Breath sounds: Normal breath sounds  Abdominal:      Palpations: Abdomen is soft  Tenderness: There is no abdominal tenderness  Musculoskeletal:      Cervical back: Neck supple  Skin:     General: Skin is warm and dry  Neurological:      Mental Status: He is alert  Additional Data:     Labs:  Results from last 7 days   Lab Units 08/30/21  0502 08/28/21  0620   WBC Thousand/uL 10 24* 10 63*   HEMOGLOBIN g/dL 11 4* 13 8   HEMATOCRIT % 35 3* 41 4   PLATELETS Thousands/uL 188 249   NEUTROS PCT %  --  74   LYMPHS PCT %  --  13*   MONOS PCT %  --  10   EOS PCT %  --  1     Results from last 7 days   Lab Units 08/30/21  0502 08/28/21  0620   SODIUM mmol/L 131* 132*   POTASSIUM mmol/L 3 9 4 5   CHLORIDE mmol/L 99* 98*   CO2 mmol/L 24 24   BUN mg/dL 13 14   CREATININE mg/dL 1 12 1 10   ANION GAP mmol/L 8 10   CALCIUM mg/dL 8 4 8 3   ALBUMIN g/dL  --  3 4*   TOTAL BILIRUBIN mg/dL  --  0 60   ALK PHOS U/L  --  58   ALT U/L  --  31   AST U/L  --  33   GLUCOSE RANDOM mg/dL 102 83     Results from last 7 days   Lab Units 08/28/21  0620   INR  1 14                   Lines/Drains:  Invasive Devices     Peripheral Intravenous Line            Peripheral IV 08/28/21 Right Antecubital 2 days    Peripheral IV 08/28/21 Right Hand 2 days                      Imaging: No pertinent imaging reviewed      Recent Cultures (last 7 days):         Last 24 Hours Medication List:   Current Facility-Administered Medications   Medication Dose Route Frequency Provider Last Rate    acetaminophen  650 mg Oral Q6H PRN Rosina Bradshaw MD      aspirin  81 mg Oral Daily Ryanne Meza MD      enoxaparin  40 mg Subcutaneous Daily Ryanne Meza MD      folic acid  1 mg Oral Daily Ryanne Meza MD      furosemide  40 mg Oral Daily Ryanne Meza MD      hydrALAZINE  5 mg Intravenous Q6H PRN Candy Rios PA-C      HYDROmorphone  0 5 mg Intravenous Q4H PRN Marimar Resendiz EDWIGE      metoprolol succinate  50 mg Oral BID Marian George MD      ondansetron  4 mg Intravenous Q4H PRN Rosaura Carlisle MD      oxyCODONE  10 mg Oral Q4H PRN Carmen Forbes PA-C      oxyCODONE  5 mg Oral Q4H PRN Carmen Forbes PA-C      pantoprazole  40 mg Oral Early Morning Marian George MD      polyethylene glycol  17 g Oral Daily Carmen Forbes PA-C      pravastatin  20 mg Oral Daily Marian George MD      senna-docusate sodium  1 tablet Oral BID PRN Rosaura Carlisle MD      tamsulosin  0 4 mg Oral Daily With [employer] Micheal, EDWIGE      thiamine  100 mg Oral Daily Marian George MD      zolpidem  5 mg Oral HS PRN Marian George MD          Today, Patient Was Seen By: Carmen Forbes PA-C    **Please Note: This note may have been constructed using a voice recognition system  **

## 2021-08-30 NOTE — CASE MANAGEMENT
LOS 1  Pt is a documented bundle: Hip and femur procedures except major joint SNF LOS 20-22  Pt is not a readmission  Green readmission score of 11=low risk for readmission    Met with pt and wife to discuss CM role and DCP  Pt lives with wife in [address] with 2 MORENA and first floor bedroom  Independent ADLs and ambulation PTA  Retired  Drives  DME: none  Pt has had homecare in past but unable to recall same  Prior rehab at [facility] but does not wish to go back there  A post acute care recommendation was made by your care team for STR  Discussed Freedom of Choice with both patient and caregiver  List of facilities given to both patient and caregiver via in person  both patient and caregiver aware the list is custom filtered for them by zip code location and that [facility] post acute providers are designated  Patient and spouse selected the following rehabs: [facility] (first choice), [facility] (2nd choice), [facility]  PCP: Dr Seda Casas: [facility] on [address]  Primary contact and POA: wife Abigail Jimenez [phone]  Pt has LW  Requested copies  Pt's pain meds ^ today and no BM x3 days  Pt will likely require BLS transport upon d/c  Pt reports he pays a donation to [employer] annually and is entitled to one round trip BLS ride?   Pt reports he is fully vaccinated with [facility] Reyes for COVID 4/21

## 2021-08-30 NOTE — ASSESSMENT & PLAN NOTE
History of alcohol abuse, drinks 10 ounces of vodka daily  Blood alcohol level 235 on admission  Continue CIWA protocol  Librium 5mg TID - will discontinue as patient is not showing signs of withdrawal at this time   On thiamine, folate

## 2021-08-30 NOTE — PLAN OF CARE
Problem: PAIN - ADULT  Goal: Verbalizes/displays adequate comfort level or baseline comfort level  Description: Interventions:  - Encourage patient to monitor pain and request assistance  - Assess pain using appropriate pain scale  - Administer analgesics based on type and severity of pain and evaluate response  - Implement non-pharmacological measures as appropriate and evaluate response  - Consider cultural and social influences on pain and pain management  - Notify physician/advanced practitioner if interventions unsuccessful or patient reports new pain  Outcome: Progressing     Problem: INFECTION - ADULT  Goal: Absence or prevention of progression during hospitalization  Description: INTERVENTIONS:  - Assess and monitor for signs and symptoms of infection  - Monitor lab/diagnostic results  - Monitor all insertion sites, i e  indwelling lines, tubes, and drains  - Monitor endotracheal if appropriate and nasal secretions for changes in amount and color  - Benson appropriate cooling/warming therapies per order  - Administer medications as ordered  - Instruct and encourage patient and family to use good hand hygiene technique  - Identify and instruct in appropriate isolation precautions for identified infection/condition  Outcome: Progressing  Goal: Absence of fever/infection during neutropenic period  Description: INTERVENTIONS:  - Monitor WBC    Outcome: Progressing     Problem: SAFETY ADULT  Goal: Patient will remain free of falls  Description: INTERVENTIONS:  - Educate patient/family on patient safety including physical limitations  - Instruct patient to call for assistance with activity   - Consult OT/PT to assist with strengthening/mobility   - Keep Call bell within reach  - Keep bed low and locked with side rails adjusted as appropriate  - Keep care items and personal belongings within reach  - Initiate and maintain comfort rounds  - Make Fall Risk Sign visible to staff  - Offer Toileting every 2 Hours, in advance of need  - Initiate/Maintain bed alarm  - Obtain necessary fall risk management equipment: yellow socks/bracelet  - Apply yellow socks and bracelet for high fall risk patients  - Consider moving patient to room near nurses station  Outcome: Progressing  Goal: Maintain or return to baseline ADL function  Description: INTERVENTIONS:  -  Assess patient's ability to carry out ADLs; assess patient's baseline for ADL function and identify physical deficits which impact ability to perform ADLs (bathing, care of mouth/teeth, toileting, grooming, dressing, etc )  - Assess/evaluate cause of self-care deficits   - Assess range of motion  - Assess patient's mobility; develop plan if impaired  - Assess patient's need for assistive devices and provide as appropriate  - Encourage maximum independence but intervene and supervise when necessary  - Involve family in performance of ADLs  - Assess for home care needs following discharge   - Consider OT consult to assist with ADL evaluation and planning for discharge  - Provide patient education as appropriate  Outcome: Progressing  Goal: Maintains/Returns to pre admission functional level  Description: INTERVENTIONS:  - Perform BMAT or MOVE assessment daily    - Set and communicate daily mobility goal to care team and patient/family/caregiver  - Collaborate with rehabilitation services on mobility goals if consulted  - Perform Range of Motion 4 times a day  - Reposition patient every 2 hours    - Dangle patient 4 times a day  - Stand patient 4 times a day  - Ambulate patient 4 times a day  - Out of bed to chair 4 times a day   - Out of bed for meals 3 times a day  - Out of bed for toileting  - Record patient progress and toleration of activity level   Outcome: Progressing     Problem: DISCHARGE PLANNING  Goal: Discharge to home or other facility with appropriate resources  Description: INTERVENTIONS:  - Identify barriers to discharge w/patient and caregiver  - Arrange for needed discharge resources and transportation as appropriate  - Identify discharge learning needs (meds, wound care, etc )  - Arrange for interpretive services to assist at discharge as needed  - Refer to Case Management Department for coordinating discharge planning if the patient needs post-hospital services based on physician/advanced practitioner order or complex needs related to functional status, cognitive ability, or social support system  Outcome: Progressing     Problem: Knowledge Deficit  Goal: Patient/family/caregiver demonstrates understanding of disease process, treatment plan, medications, and discharge instructions  Description: Complete learning assessment and assess knowledge base    Interventions:  - Provide teaching at level of understanding  - Provide teaching via preferred learning methods  Outcome: Progressing     Problem: Potential for Falls  Goal: Patient will remain free of falls  Description: INTERVENTIONS:  - Educate patient/family on patient safety including physical limitations  - Instruct patient to call for assistance with activity   - Consult OT/PT to assist with strengthening/mobility   - Keep Call bell within reach  - Keep bed low and locked with side rails adjusted as appropriate  - Keep care items and personal belongings within reach  - Initiate and maintain comfort rounds  - Make Fall Risk Sign visible to staff  - Offer Toileting every 2 Hours, in advance of need  - Initiate/Maintain alarm  - Obtain necessary fall risk management equipment  - Apply yellow socks and bracelet for high fall risk patients  - Consider moving patient to room near nurses station  Outcome: Progressing     Problem: Prexisting or High Potential for Compromised Skin Integrity  Goal: Skin integrity is maintained or improved  Description: INTERVENTIONS:  - Identify patients at risk for skin breakdown  - Assess and monitor skin integrity  - Assess and monitor nutrition and hydration status  - Monitor labs   - Assess for incontinence   - Turn and reposition patient  - Assist with mobility/ambulation  - Relieve pressure over bony prominences  - Avoid friction and shearing  - Provide appropriate hygiene as needed including keeping skin clean and dry  - Evaluate need for skin moisturizer/barrier cream  - Collaborate with interdisciplinary team   - Patient/family teaching  - Consider wound care consult   Outcome: Progressing     Problem: MOBILITY - ADULT  Goal: Maintain or return to baseline ADL function  Description: INTERVENTIONS:  -  Assess patient's ability to carry out ADLs; assess patient's baseline for ADL function and identify physical deficits which impact ability to perform ADLs (bathing, care of mouth/teeth, toileting, grooming, dressing, etc )  - Assess/evaluate cause of self-care deficits   - Assess range of motion  - Assess patient's mobility; develop plan if impaired  - Assess patient's need for assistive devices and provide as appropriate  - Encourage maximum independence but intervene and supervise when necessary  - Involve family in performance of ADLs  - Assess for home care needs following discharge   - Consider OT consult to assist with ADL evaluation and planning for discharge  - Provide patient education as appropriate  Outcome: Progressing  Goal: Maintains/Returns to pre admission functional level  Description: INTERVENTIONS:  - Perform BMAT or MOVE assessment daily    - Set and communicate daily mobility goal to care team and patient/family/caregiver  - Collaborate with rehabilitation services on mobility goals if consulted  - Perform Range of Motion 4 times a day  - Reposition patient every 2 hours    - Dangle patient 4 times a day  - Stand patient 4 times a day  - Ambulate patient 4 times a day  - Out of bed to chair 4 times a day   - Out of bed for meals 3 times a day  - Out of bed for toileting  - Record patient progress and toleration of activity level   Outcome: Progressing

## 2021-08-30 NOTE — ASSESSMENT & PLAN NOTE
· Right hip fracture, sustained after a fall after drinking heavily  ·  Right femur x-ray- right trochanteric femur fracture  · Status post right hip TFN (8/28/21) POD #2  · Postop care per Orthopedics  · DVT prophylaxis- Lovenox  · Pain control p r n   · P T /OT consulted - STR

## 2021-08-30 NOTE — PLAN OF CARE
Problem: PHYSICAL THERAPY ADULT  Goal: Performs mobility at highest level of function for planned discharge setting  See evaluation for individualized goals  Description: Treatment/Interventions: ADL retraining, Functional transfer training, LE strengthening/ROM, Elevations, Therapeutic exercise, Endurance training, Patient/family training, Equipment eval/education, Bed mobility, Gait training, Spoke to nursing, Spoke to case management, OT  Equipment Recommended: Kathleen Pathak       See flowsheet documentation for full assessment, interventions and recommendations  Outcome: Progressing  Note: Prognosis: Good  Problem List: Decreased strength, Decreased endurance, Impaired balance, Decreased mobility, Decreased coordination, Decreased safety awareness, Orthopedic restrictions, Decreased skin integrity, Pain  Assessment: Pt presents with increased pain R le after fall with femur fx adn TFN placement  Only abl etotol standing with rw and max Ax2  Unable to wt shift to take steps for gait training  Will beenfit form skilledTP serivc esto regain strength Rle, transfers and funcitonal gait  REcommend in-pt rehab at d/c  Will follow see goals  Barriers to Discharge:  (pain, mobility)        PT Discharge Recommendation: Post acute rehabilitation services     PT - OK to Discharge: Yes    See flowsheet documentation for full assessment

## 2021-08-30 NOTE — PLAN OF CARE
Problem: OCCUPATIONAL THERAPY ADULT  Goal: Performs self-care activities at highest level of function for planned discharge setting  See evaluation for individualized goals  Description: Treatment Interventions: ADL retraining, Functional transfer training, Patient/family training, Compensatory technique education          See flowsheet documentation for full assessment, interventions and recommendations  Note: Limitation: Decreased ADL status, Decreased self-care trans, Decreased high-level ADLs  Prognosis: Good  Assessment: Pt is a 66 y o  male seen for OT evaluation at [facility], admitted 8/28/2021 s/p fall after drinking  Pt found w/ Closed fracture of right hip ([address] )  Pt now s/p right hip TFN  WBAT per ortho  OT completed extensive review of pt's medical and social history  Comorbidities affecting pt's functional performance at time of assessment include: CABG in 2014, alcohol abuse, ambulatory dysfunction, atelectasis, and hypertension  Personal factors affecting pt at time of IE include:steps to enter environment, difficulty performing ADLS, difficulty performing IADLS  and decreaesed functional mobility  Prior to admission, pt was living with spouse in house with steps to manage  Pt was I w/  ADLS and IADLS, (+) drove, & required no use of DME PTA  Upon evaluation: Pt requires max Ax 2 for bed mobility, mod-max Ax 2 for functional mobility/transfers, supervision for UB ADLs and max A for LB ADLS 2* the following deficits impacting occupational performance: weakness, decreased ROM, decreased strength, decreased balance, decreased tolerance and increased pain  Pt to benefit from continued skilled OT tx while in the hospital to address deficits as defined above and maximize level of functional independence w ADL's and functional mobility  Occupational Performance areas to address include: bathing/shower, toilet hygiene, dressing and functional mobility   Based on findings, pt is of high complexity  The patient's raw score on the AM-PAC Daily Activity inpatient short form is 18, standardized score is 38 66, less than 39 4  Patients at this level are likely to benefit from DC to post-acute rehabilitation services  Please refer to the recommendation of the Occupational Therapist for safe DC planning  At this time, OT recommendations at time of discharge are short term rehab       OT Discharge Recommendation: Post acute rehabilitation services

## 2021-08-30 NOTE — PHYSICAL THERAPY NOTE
PT eval   08/30/21 0936   PT Last Visit   PT Visit Date 08/30/21   Note Type   Note type Evaluation  (+tx)   Pain Assessment   Pain Assessment Tool 0-10   Pain Score 8   Pain Location/Orientation Orientation: Right;Location: Hip   Home Living   Type of [address] One level;Stairs to enter without rails  (2 lion)   Additional Comments no AD pta   Prior Function   Level of [place] Independent with ADLs and functional mobility   Lives With Spouse   Receives Help From Family   ADL Assistance Independent   IADLs Independent   Falls in the last 6 months 1 to 4   Vocational Retired   Comments dailyETOH   Restrictions/Precautions   Wells [place] Bearing Precautions Per Order Yes   RLE [place] Bearing Per Order WBAT   General   Additional Pertinent History daily ETOH, fell fx R hip underwent TFN referred WBAT Rle   Family/Caregiver Present Yes   Cognition   Overall Cognitive Status WFL   Arousal/Participation Alert   Orientation Level Oriented X4   Memory Within functional limits   Following Commands Follows all commands and directions without difficulty   RUE Assessment   RUE Assessment WFL   LUE Assessment   LUE Assessment WFL   RLE Assessment   RLE Assessment WFL  (painful strength 3-/5)   LLE Assessment   LLE Assessment WFL   Coordination   Movements are Fluid and Coordinated   (bradykineticRle)   Sensation WFL   Proprioception   RLE Proprioception Grossly intact   LLE Proprioception Grossly Intact   Bed Mobility   Supine to Sit 2  Maximal assistance   Additional items Assist x 2; Increased time required;Verbal cues;LE management   Sit to Supine 2  Maximal assistance   Additional items Assist x 2;Leg ;Verbal cues;LE management   Transfers   Sit to Stand 2  Maximal assistance   Additional items Assist x 2; Increased time required;Verbal cues;Armrests   Stand to Sit 3  Moderate assistance   Additional items Assist x 2; Increased time required;Verbal cues; Bed elevated  (assist to [facility] Life)   Stand pivot Unable to assess   Ambulation/Elevation   Gait pattern   (unable to take steps stood x 20 sec w/RW pwb Rle)   Balance   Static Sitting Fair   Dynamic Sitting Poor +   Static Standing Poor +   Dynamic Standing Poor   Endurance Deficit   Endurance Deficit Yes   Endurance Deficit Description tires quickly with standing    Activity Tolerance   Activity Tolerance Patient limited by pain; Patient limited by fatigue   Medical Staff Made Aware OT Stacey Austin   Nurse Made Aware KRISTIN Healy   Assessment   Prognosis Good   Problem List Decreased strength;Decreased endurance; Impaired balance;Decreased mobility; Decreased coordination;Decreased safety awareness;Orthopedic restrictions;Decreased skin integrity;Pain   Assessment Pt presents with increased pain R le after fall with femur fx adn TFN placement  Only abl etotol standing with rw and max Ax2  Unable to wt shift to take steps for gait training  Will beenfit form skilledTP serivc esto regain strength Rle, transfers and funcitonal gait  REcommend in-pt rehab at d/c  Will follow see goals   Barriers to Discharge   (pain, mobility)   Goals   Patient Goals getbeer pain management   [facility] Expiration Date 09/08/21   Short Term Goal #1 1) improve strength R le 1/2 grade 2) safe din trnasfers wtih rw wbat Rle 3) safe idn amb with rw 25' levle wbat Rle 4) safe step negotiation x2 for safe home entry   Plan   Treatment/Interventions ADL retraining;Functional transfer training;LE strengthening/ROM; Elevations; Therapeutic exercise; Endurance training;Patient/family training;Equipment eval/education; Bed mobility;Gait training;Spoke to nursing;Spoke to case management;OT   PT Frequency 5x/wk   Recommendation   PT Discharge Recommendation Post acute rehabilitation services   Equipment Recommended [address] [facility] Recommended Wheeled walker   PT - OK to Discharge Yes   Additional Comments   (to STR )   Chidi 8 in Bed Without Bedrails 1   Lying on Back to

## 2021-08-30 NOTE — OCCUPATIONAL THERAPY NOTE
Occupational Therapy Evaluation ([id number]) & Treat ([id number])     Patient Name: Chase MELO Date: 8/30/2021  Problem List  Principal Problem:    Closed fracture of right hip ([address] )  Active Problems:    Other insomnia    Hypercholesteremia    Gastroesophageal reflux disease without esophagitis    Essential hypertension    S/P CABG (coronary artery bypass graft)    Paroxysmal atrial fibrillation (HCC)    Ambulatory dysfunction    Alcohol abuse    Atelectasis of left lung    Acute urinary retention    Sinus tachycardia    Past Medical History  Past Medical History:   Diagnosis Date    Alcohol abuse     Sparks esophagus     Coronary artery disease     Fracture     neck    Hypertension      Past Surgical History  Past Surgical History:   Procedure Laterality Date    APPENDECTOMY      CARDIAC SURGERY  2014    CABG    IR THORACENTESIS  4/29/2020    LIPOMA RESECTION      [place] OPEN RX FEMUR FX+INTRAMED OTILIO Right 8/28/2021    Procedure: INSERTION NAIL IM FEMUR ANTEGRADE (TROCHANTERIC); Surgeon: Sadie Girard MD;  Location:  MAIN OR;  Service: Orthopedics    SPINE SURGERY  06/18/2019    Upper cervical fusion      08/30/21 0934   OT Last Visit   OT Visit Date 08/30/21   Note Type   Note type Evaluation  (& Treat)   Restrictions/Precautions   Weight Bearing Precautions Per Order Yes   RLE Weight Bearing Per Order WBAT   Pain Assessment   Pain Assessment Tool Ryan-Baker FACES   Ryan-Baker FACES Pain Rating 8   Pain Location/Orientation Orientation: Right;Location: Leg   Effect of Pain on Daily Activities transfers, movement   Patient's Stated Pain Goal No pain   Hospital Pain Intervention(s) Cold applied;Repositioned; Rest   Home Living   Type of [address] [facility] One level;Stairs to enter with rails   Bathroom Shower/Tub Tub/shower unit   [employer] Standard   Additional Comments no AD PTA   Prior Function   Level of [place] Independent with ADLs and functional mobility   Lives With Spouse   Receives Help From Family   ADL Assistance Independent   IADLs Independent   Falls in the last 6 months 1 to 4   Vocational Retired  (Teacher)   Subjective   Subjective Pt received in semisupine position  Pt anxious about moving  Agreeable to session  ADL   Eating Assistance 7  Independent   Eating Deficit Setup   Grooming Assistance 7  Independent   Grooming Deficit Setup   UB Bathing Assistance 5  Supervision/Setup   LB Bathing Assistance 2  Maximal Assistance   UB Dressing Assistance 5  Supervision/Setup   LB Dressing Assistance 2  Maximal [address]  2  Maximal Assistance   Bed Mobility   Supine to Sit 2  Maximal assistance   Additional items Assist x 2;Verbal cues; Increased time required;HOB elevated; Bedrails   Sit to Supine 2  Maximal assistance   Additional items Assist x 2;Verbal cues; Increased time required; Bedrails   Additional Comments Pt able to perform bridging with min Ax 1  Transfers   Sit to Stand 2  Maximal assistance   Additional items Assist x 2;Verbal cues   Stand to Sit 3  Moderate assistance   Additional items Assist x 2   Balance   Static Sitting Fair   Dynamic Sitting Poor +   Static Standing Poor +   Dynamic Standing Poor   Activity Tolerance   Activity Tolerance Patient limited by pain   Medical Staff Made Aware PT Francia   Nurse Made Aware KRISTIN Morgan   RUE Assessment   RUE Assessment WFL   LUE Assessment   LUE Assessment WFL   Cognition   Overall Cognitive Status WFL   Arousal/Participation Alert   Attention Within functional limits   Orientation Level Oriented X4   Memory Within functional limits   Following Commands Follows all commands and directions without difficulty   Assessment   Limitation Decreased ADL status; Decreased self-care trans;Decreased high-level ADLs   Prognosis Good   Assessment Pt is a 66 y o  male seen for OT evaluation at [facility], admitted 8/28/2021 s/p fall after drinking  Pt found w/ Closed fracture of right hip ([address] )  Pt now s/p right hip TFN  WBAT per ortho  OT completed extensive review of pt's medical and social history  Comorbidities affecting pt's functional performance at time of assessment include: CABG in 2014, alcohol abuse, ambulatory dysfunction, atelectasis, and hypertension  Personal factors affecting pt at time of IE include:steps to enter environment, difficulty performing ADLS, difficulty performing IADLS  and decreaesed functional mobility  Prior to admission, pt was living with spouse in house with steps to manage  Pt was I w/  ADLS and IADLS, (+) drove, & required no use of DME PTA  Upon evaluation: Pt requires max Ax 2 for bed mobility, mod-max Ax 2 for functional mobility/transfers, supervision for UB ADLs and max A for LB ADLS 2* the following deficits impacting occupational performance: weakness, decreased ROM, decreased strength, decreased balance, decreased tolerance and increased pain  Pt to benefit from continued skilled OT tx while in the hospital to address deficits as defined above and maximize level of functional independence w ADL's and functional mobility  Occupational Performance areas to address include: bathing/shower, toilet hygiene, dressing and functional mobility  Based on findings, pt is of high complexity  The patient's raw score on the AM-PAC Daily Activity inpatient short form is 18, standardized score is 38 66, less than 39 4  Patients at this level are likely to benefit from DC to post-acute rehabilitation services  Please refer to the recommendation of the Occupational Therapist for safe DC planning  At this time, OT recommendations at time of discharge are short term rehab  Goals   Patient Goals Pt wishes to get better   Plan   Treatment Interventions ADL retraining;Functional transfer training;Patient/family training; Compensatory technique education   Goal Expiration Date 09/09/21   OT Treatment Day 0   OT Frequency 3-5x/wk   Additional Treatment Session   Treatment Assessment Pt seen for subsequent treatment session to address transfers and functional mobility  Pt provided with mobility strategies to facilitate use of bed pan  Pt able to perform several reps of bridging in bed with min Ax 1  While seated at EOB, pt provided with continuous VC maintain appropriate body mechanics to perform sit>stand tranfers with mod-max Ax 2  Pt unable to perform appropriate weightshifting to get to the chair  Pt however able to perform lateral scooting towards head of bed with mod Ax 2  Pt returned to supine position with max Ax 2  Pt remained seated in semisupine position  NAD  All needs met  Call bell in reach  RN  aware  Wife at bedside  Recommendation   OT Discharge Recommendation Post acute rehabilitation services   AM-PAC Daily Activity Inpatient   Lower Body Dressing 2   Bathing 2   Toileting 2   Upper Body Dressing 4   Grooming 4   Eating 4   Daily Activity Raw Score 18   Daily Activity Standardized Score (Calc for Raw Score >=11) 38 66   AM-PAC Applied Cognition Inpatient   Following a Speech/Presentation 4   Understanding Ordinary Conversation 4   Taking Medications 4   Remembering Where Things Are Placed or Put Away 4   Remembering List of 4-5 Errands 4   Taking Care of Complicated Tasks 4   Applied Cognition Raw Score 24   Applied Cognition Standardized Score 62 21         Pt will achieve the following goals within 10 days  *Pt will complete UB bathing and dressing with supervision  *Pt will complete LB bathing and dressing with min A      * Pt will complete toileting w/ min A w/ G hygiene/thoroughness using DME PRN    *Pt will complete bed mobility with min Ax 1, with bed flat and no side rail to prep for purposeful tasks    *Pt will perform functional transfers with on/off all surfaces with min Ax 1 using DME as needed w/ G balance/safety  *Pt will increase standing tolerance to 2 minutes in order to complete pericare       *Pt will identify 3-5 fall risks during ADL routine to ensure home safety upon discharge  *Pt will improve functional mobility during ADL/IADL/leisure tasks to min Ax 1 using DME as needed w/ G balance/safety  * Assess DME needs           Yamil Jo OTR/L

## 2021-08-31 ENCOUNTER — PATIENT OUTREACH (OUTPATIENT)
Dept: CASE MANAGEMENT | Facility: OTHER | Age: 71
End: 2021-08-31

## 2021-08-31 VITALS
DIASTOLIC BLOOD PRESSURE: 81 MMHG | BODY MASS INDEX: 28.89 KG/M2 | WEIGHT: 244.71 LBS | RESPIRATION RATE: 18 BRPM | TEMPERATURE: 98.3 F | HEART RATE: 113 BPM | SYSTOLIC BLOOD PRESSURE: 117 MMHG | HEIGHT: 77 IN | OXYGEN SATURATION: 93 %

## 2021-08-31 PROBLEM — D62 ACUTE BLOOD LOSS ANEMIA: Status: ACTIVE | Noted: 2021-08-31

## 2021-08-31 LAB
ANION GAP SERPL CALCULATED.3IONS-SCNC: 8 MMOL/L (ref 4–13)
BUN SERPL-MCNC: 14 MG/DL (ref 5–25)
CALCIUM SERPL-MCNC: 8.3 MG/DL (ref 8.3–10.1)
CHLORIDE SERPL-SCNC: 96 MMOL/L (ref 100–108)
CO2 SERPL-SCNC: 26 MMOL/L (ref 21–32)
CREAT SERPL-MCNC: 1.05 MG/DL (ref 0.6–1.3)
ERYTHROCYTE [DISTWIDTH] IN BLOOD BY AUTOMATED COUNT: 13.1 % (ref 11.6–15.1)
GFR SERPL CREATININE-BSD FRML MDRD: 70 ML/MIN/1.73SQ M
GLUCOSE SERPL-MCNC: 99 MG/DL (ref 65–140)
HCT VFR BLD AUTO: 30.5 % (ref 36.5–49.3)
HGB BLD-MCNC: 10.1 G/DL (ref 12–17)
MCH RBC QN AUTO: 30.9 PG (ref 26.8–34.3)
MCHC RBC AUTO-ENTMCNC: 33.1 G/DL (ref 31.4–37.4)
MCV RBC AUTO: 93 FL (ref 82–98)
PLATELET # BLD AUTO: 175 THOUSANDS/UL (ref 149–390)
PMV BLD AUTO: 10.4 FL (ref 8.9–12.7)
POTASSIUM SERPL-SCNC: 3.8 MMOL/L (ref 3.5–5.3)
RBC # BLD AUTO: 3.27 MILLION/UL (ref 3.88–5.62)
SARS-COV-2 RNA RESP QL NAA+PROBE: NEGATIVE
SODIUM SERPL-SCNC: 130 MMOL/L (ref 136–145)
WBC # BLD AUTO: 8.39 THOUSAND/UL (ref 4.31–10.16)

## 2021-08-31 PROCEDURE — 97530 THERAPEUTIC ACTIVITIES: CPT

## 2021-08-31 PROCEDURE — 85027 COMPLETE CBC AUTOMATED: CPT | Performed by: INTERNAL MEDICINE

## 2021-08-31 PROCEDURE — U0003 INFECTIOUS AGENT DETECTION BY NUCLEIC ACID (DNA OR RNA); SEVERE ACUTE RESPIRATORY SYNDROME CORONAVIRUS 2 (SARS-COV-2) (CORONAVIRUS DISEASE [COVID-19]), AMPLIFIED PROBE TECHNIQUE, MAKING USE OF HIGH THROUGHPUT TECHNOLOGIES AS DESCRIBED BY CMS-2020-01-R: HCPCS | Performed by: PHYSICIAN ASSISTANT

## 2021-08-31 PROCEDURE — 99239 HOSP IP/OBS DSCHRG MGMT >30: CPT | Performed by: PHYSICIAN ASSISTANT

## 2021-08-31 PROCEDURE — U0005 INFEC AGEN DETEC AMPLI PROBE: HCPCS | Performed by: PHYSICIAN ASSISTANT

## 2021-08-31 PROCEDURE — 99024 POSTOP FOLLOW-UP VISIT: CPT | Performed by: PHYSICIAN ASSISTANT

## 2021-08-31 PROCEDURE — 80048 BASIC METABOLIC PNL TOTAL CA: CPT | Performed by: INTERNAL MEDICINE

## 2021-08-31 PROCEDURE — 97110 THERAPEUTIC EXERCISES: CPT

## 2021-08-31 RX ORDER — TAMSULOSIN HYDROCHLORIDE 0.4 MG/1
0.4 CAPSULE ORAL
Refills: 0
Start: 2021-08-31 | End: 2021-09-17 | Stop reason: ALTCHOICE

## 2021-08-31 RX ORDER — OXYCODONE HYDROCHLORIDE 5 MG/1
5 TABLET ORAL EVERY 4 HOURS PRN
Qty: 12 TABLET | Refills: 0 | Status: SHIPPED | OUTPATIENT
Start: 2021-08-31 | End: 2021-09-10

## 2021-08-31 RX ORDER — AMOXICILLIN 250 MG
1 CAPSULE ORAL 2 TIMES DAILY PRN
Refills: 0
Start: 2021-08-31 | End: 2021-10-19 | Stop reason: ALTCHOICE

## 2021-08-31 RX ORDER — OXYCODONE HYDROCHLORIDE 10 MG/1
10 TABLET ORAL EVERY 4 HOURS PRN
Qty: 12 TABLET | Refills: 0 | Status: SHIPPED | OUTPATIENT
Start: 2021-08-31 | End: 2021-09-10

## 2021-08-31 RX ADMIN — FOLIC ACID 1 MG: 1 TABLET ORAL at 08:37

## 2021-08-31 RX ADMIN — METOPROLOL SUCCINATE 50 MG: 50 TABLET, EXTENDED RELEASE ORAL at 08:37

## 2021-08-31 RX ADMIN — FUROSEMIDE 40 MG: 40 TABLET ORAL at 08:37

## 2021-08-31 RX ADMIN — PRAVASTATIN SODIUM 20 MG: 20 TABLET ORAL at 08:37

## 2021-08-31 RX ADMIN — ASPIRIN 81 MG: 81 TABLET, COATED ORAL at 08:37

## 2021-08-31 RX ADMIN — POLYETHYLENE GLYCOL 3350 17 G: 17 POWDER, FOR SOLUTION ORAL at 08:37

## 2021-08-31 RX ADMIN — OXYCODONE HYDROCHLORIDE 5 MG: 5 TABLET ORAL at 10:50

## 2021-08-31 RX ADMIN — ENOXAPARIN SODIUM 40 MG: 100 INJECTION SUBCUTANEOUS at 08:37

## 2021-08-31 RX ADMIN — PANTOPRAZOLE SODIUM 40 MG: 40 TABLET, DELAYED RELEASE ORAL at 05:23

## 2021-08-31 RX ADMIN — SODIUM CHLORIDE 500 ML: 0.9 INJECTION, SOLUTION INTRAVENOUS at 11:33

## 2021-08-31 RX ADMIN — HYDROMORPHONE HYDROCHLORIDE 0.5 MG: 1 INJECTION, SOLUTION INTRAMUSCULAR; INTRAVENOUS; SUBCUTANEOUS at 03:55

## 2021-08-31 RX ADMIN — THIAMINE HCL TAB 100 MG 100 MG: 100 TAB at 08:37

## 2021-08-31 RX ADMIN — OXYCODONE HYDROCHLORIDE 10 MG: 5 TABLET ORAL at 08:37

## 2021-08-31 NOTE — DISCHARGE INSTRUCTIONS
Discharge Instructions - Orthopedics  Rudy Garcia 66 y o  male MRN: [id number]  Unit/Bed#: -01    Weight Bearing Status:                                           Weight bearing as tolerated to right lower extremity    DVT prophylaxis  Lovenox 40mg subq daily for 30 days     Pain:  Continue analgesics as directed    Dressing Instructions:   Please keep clean, dry and intact until follow up     Appt Instructions: If you do not have your appointment, please call the clinic at [phone] to see Marlan Fleischer in 2 weeks post-op  Otherwise followup as scheduled     Contact the office sooner if you experience any increased numbness/tingling in the extremities

## 2021-08-31 NOTE — ASSESSMENT & PLAN NOTE
On ambien prn  Patient states he uses alcohol in order to sleep  Discussed ill effects of alcohol and insomnia  Discussed considering alcohol rehab and cognitive behavioral therapy as an outpatient

## 2021-08-31 NOTE — ASSESSMENT & PLAN NOTE
· Right hip fracture, sustained after a fall after drinking heavily  ·  Right femur x-ray- right trochanteric femur fracture  · Status post right hip TFN (8/28/21) POD #3  · Postop care per Orthopedics  · DVT prophylaxis- Lovenox  · Pain control p r n   · P T /OT consulted - STR

## 2021-08-31 NOTE — ASSESSMENT & PLAN NOTE
History of alcohol abuse, drinks 10 ounces of vodka daily  Blood alcohol level 235 on admission  Continue CIWA protocol  Librium 5mg TID - discontinued as patient is not showing signs of withdrawal at this time   On thiamine, folate  Discussed alcohol cessation

## 2021-08-31 NOTE — OCCUPATIONAL THERAPY NOTE
Occupational Therapy Tx Note      Patient Name: Arthur Felix  KSFDS'D Date: 8/31/2021  Problem List  Principal Problem:    Closed fracture of right hip ([address] )  Active Problems:    Other insomnia    Hypercholesteremia    Gastroesophageal reflux disease without esophagitis    Essential hypertension    S/P CABG (coronary artery bypass graft)    Paroxysmal atrial fibrillation (HCC)    Ambulatory dysfunction    Alcohol abuse    Atelectasis of left lung    Acute urinary retention    Sinus tachycardia    Acute blood loss anemia    Past Medical History  Past Medical History:   Diagnosis Date    Alcohol abuse     Sparks esophagus     Coronary artery disease     Fracture     neck    Hypertension      Past Surgical History  Past Surgical History:   Procedure Laterality Date    APPENDECTOMY      CARDIAC SURGERY  2014    CABG    IR THORACENTESIS  4/29/2020    LIPOMA RESECTION      [place] OPEN RX FEMUR FX+INTRAMED OTILIO Right 8/28/2021    Procedure: INSERTION NAIL IM FEMUR ANTEGRADE (TROCHANTERIC); Surgeon: Isabel Zuniga MD;  Location:  MAIN OR;  Service: Orthopedics    SPINE SURGERY  06/18/2019    Upper cervical fusion         08/31/21 1058   OT Last Visit   OT Visit Date 08/31/21   Note Type   Note Type Treatment   Restrictions/Precautions   Weight Bearing Precautions Per Order Yes   RLE Weight Bearing Per Order WBAT   Pain Assessment   Pain Assessment Tool Ryan-Baker FACES   Ryan-Baker FACES Pain Rating 8   Pain Location/Orientation Orientation: Right;Location: Leg   Patient's Stated Pain Goal No pain   ADL   LB Dressing Assistance 2  Maximal Assistance   LB Dressing Deficit Don/doff R sock; Don/doff L sock   Bed Mobility   Supine to Sit 2  Maximal assistance   Additional items Assist x 2; Increased time required   Transfers   Sit to Stand 2  Maximal assistance   Additional items Assist x 2;Verbal cues   Stand to Sit 2  Maximal assistance Additional items Assist x 2;Verbal cues   Stand pivot 2  Maximal assistance   Additional items Assist x 2;Verbal cues  (RW)   Cognition   Overall Cognitive Status WFL   Arousal/Participation Alert   Attention Within functional limits   Orientation Level Oriented X4   Memory Within functional limits   Following Commands Follows one step commands without difficulty   Activity Tolerance   Activity Tolerance Patient limited by fatigue;Patient limited by pain   Medical Staff Made Aware PT KRISTIN Bolanos   Assessment   Assessment Pt seen for OT tx session with focus on functional balance, functional mobility, ADL status, and transfer safety  Patient agreeable to OT treatment session  Pt received supine in bed  Pt increasingly able to tolerate activity  Performed transfers with max A x 2  Pt with increased difficulty weightshifting in standing   Patient continues to be functioning below baseline level, occupational performance remains limited secondary to factors listed above, and pt at increased risk for falls and injury  The patient's raw score on the AM-PAC Daily Activity inpatient short form is 18, standardized score is 38 66, less than 39 4  Patients at this level are likely to benefit from DC to post-acute rehabilitation services  Please refer to the recommendation of the Occupational Therapist for safe DC planning  From OT standpoint, recommendation at time of d/c would be Short Term Rehab  Patient to benefit from continued Occupational Therapy treatment while in the hospital to address deficits as defined above and maximize level of functional independence with ADLs and functional mobility  Pt left with call bell in reach, tray table in reach, needs met  Plan   Treatment Interventions ADL retraining;Functional transfer training; Endurance training;Equipment evaluation/education; Compensatory technique education   Goal Expiration Date 09/09/21   OT Treatment Day 1   OT Frequency 3-5x/wk   Recommendation   OT Discharge Recommendation Post acute rehabilitation services   AM-PAC Daily Activity Inpatient   Lower Body Dressing 2   Bathing 2   Toileting 2   Upper Body Dressing 4   Grooming 4   Eating 4   Daily Activity Raw Score 18   Daily Activity Standardized Score (Calc for Raw Score >=11) 38 66   AM-PAC Applied Cognition Inpatient   Following a Speech/Presentation 4   Understanding Ordinary Conversation 4   Taking Medications 4   Remembering Where Things Are Placed or Put Away 4   Remembering List of 4-5 Errands 4   Taking Care of Complicated Tasks 4   Applied Cognition Raw Score 24   Applied Cognition Standardized Score 62 21              Glonavjotus Kareen, OTR/L

## 2021-08-31 NOTE — PLAN OF CARE
Problem: PAIN - ADULT  Goal: Verbalizes/displays adequate comfort level or baseline comfort level  Description: Interventions:  - Encourage patient to monitor pain and request assistance  - Assess pain using appropriate pain scale  - Administer analgesics based on type and severity of pain and evaluate response  - Implement non-pharmacological measures as appropriate and evaluate response  - Consider cultural and social influences on pain and pain management  - Notify physician/advanced practitioner if interventions unsuccessful or patient reports new pain  Outcome: Progressing     Problem: INFECTION - ADULT  Goal: Absence or prevention of progression during hospitalization  Description: INTERVENTIONS:  - Assess and monitor for signs and symptoms of infection  - Monitor lab/diagnostic results  - Monitor all insertion sites, i e  indwelling lines, tubes, and drains  - Monitor endotracheal if appropriate and nasal secretions for changes in amount and color  - Mary Alice appropriate cooling/warming therapies per order  - Administer medications as ordered  - Instruct and encourage patient and family to use good hand hygiene technique  - Identify and instruct in appropriate isolation precautions for identified infection/condition  Outcome: Progressing  Goal: Absence of fever/infection during neutropenic period  Description: INTERVENTIONS:  - Monitor WBC    Outcome: Progressing     Problem: SAFETY ADULT  Goal: Patient will remain free of falls  Description: INTERVENTIONS:  - Educate patient/family on patient safety including physical limitations  - Instruct patient to call for assistance with activity   - Consult OT/PT to assist with strengthening/mobility   - Keep Call bell within reach  - Keep bed low and locked with side rails adjusted as appropriate  - Keep care items and personal belongings within reach  - Initiate and maintain comfort rounds  - Make Fall Risk Sign visible to staff  - Offer Toileting every 2 Hours, in advance of need  - Initiate/Maintain bed alarm  - Obtain necessary fall risk management equipment:   - Apply yellow socks and bracelet for high fall risk patients  - Consider moving patient to room near nurses station  Outcome: Progressing  Goal: Maintain or return to baseline ADL function  Description: INTERVENTIONS:  -  Assess patient's ability to carry out ADLs; assess patient's baseline for ADL function and identify physical deficits which impact ability to perform ADLs (bathing, care of mouth/teeth, toileting, grooming, dressing, etc )  - Assess/evaluate cause of self-care deficits   - Assess range of motion  - Assess patient's mobility; develop plan if impaired  - Assess patient's need for assistive devices and provide as appropriate  - Encourage maximum independence but intervene and supervise when necessary  - Involve family in performance of ADLs  - Assess for home care needs following discharge   - Consider OT consult to assist with ADL evaluation and planning for discharge  - Provide patient education as appropriate  Outcome: Progressing  Goal: Maintains/Returns to pre admission functional level  Description: INTERVENTIONS:  - Perform BMAT or MOVE assessment daily    - Set and communicate daily mobility goal to care team and patient/family/caregiver  - Collaborate with rehabilitation services on mobility goals if consulted  - Perform Range of Motion 2 times a day  - Reposition patient every 2 hours    - Dangle patient 2 times a day  - Stand patient 2 times a day  - Ambulate patient 2 times a day  - Out of bed to chair 2 times a day   - Out of bed for meals 2 times a day  - Out of bed for toileting  - Record patient progress and toleration of activity level   Outcome: Progressing     Problem: DISCHARGE PLANNING  Goal: Discharge to home or other facility with appropriate resources  Description: INTERVENTIONS:  - Identify barriers to discharge w/patient and caregiver  - Arrange for needed discharge resources and transportation as appropriate  - Identify discharge learning needs (meds, wound care, etc )  - Arrange for interpretive services to assist at discharge as needed  - Refer to Case Management Department for coordinating discharge planning if the patient needs post-hospital services based on physician/advanced practitioner order or complex needs related to functional status, cognitive ability, or social support system  Outcome: Progressing     Problem: Knowledge Deficit  Goal: Patient/family/caregiver demonstrates understanding of disease process, treatment plan, medications, and discharge instructions  Description: Complete learning assessment and assess knowledge base    Interventions:  - Provide teaching at level of understanding  - Provide teaching via preferred learning methods  Outcome: Progressing     Problem: Potential for Falls  Goal: Patient will remain free of falls  Description: INTERVENTIONS:  - Educate patient/family on patient safety including physical limitations  - Instruct patient to call for assistance with activity   - Consult OT/PT to assist with strengthening/mobility   - Keep Call bell within reach  - Keep bed low and locked with side rails adjusted as appropriate  - Keep care items and personal belongings within reach  - Initiate and maintain comfort rounds  - Make Fall Risk Sign visible to staff  - Offer Toileting every 2 Hours, in advance of need  - Initiate/Maintain bed alarm  - Obtain necessary fall risk management equipment:   - Apply yellow socks and bracelet for high fall risk patients  - Consider moving patient to room near nurses station  Outcome: Progressing     Problem: Prexisting or High Potential for Compromised Skin Integrity  Goal: Skin integrity is maintained or improved  Description: INTERVENTIONS:  - Identify patients at risk for skin breakdown  - Assess and monitor skin integrity  - Assess and monitor nutrition and hydration status  - Monitor labs   - Assess for incontinence   - Turn and reposition patient  - Assist with mobility/ambulation  - Relieve pressure over bony prominences  - Avoid friction and shearing  - Provide appropriate hygiene as needed including keeping skin clean and dry  - Evaluate need for skin moisturizer/barrier cream  - Collaborate with interdisciplinary team   - Patient/family teaching  - Consider wound care consult   Outcome: Progressing     Problem: MOBILITY - ADULT  Goal: Maintain or return to baseline ADL function  Description: INTERVENTIONS:  -  Assess patient's ability to carry out ADLs; assess patient's baseline for ADL function and identify physical deficits which impact ability to perform ADLs (bathing, care of mouth/teeth, toileting, grooming, dressing, etc )  - Assess/evaluate cause of self-care deficits   - Assess range of motion  - Assess patient's mobility; develop plan if impaired  - Assess patient's need for assistive devices and provide as appropriate  - Encourage maximum independence but intervene and supervise when necessary  - Involve family in performance of ADLs  - Assess for home care needs following discharge   - Consider OT consult to assist with ADL evaluation and planning for discharge  - Provide patient education as appropriate  Outcome: Progressing  Goal: Maintains/Returns to pre admission functional level  Description: INTERVENTIONS:  - Perform BMAT or MOVE assessment daily    - Set and communicate daily mobility goal to care team and patient/family/caregiver  - Collaborate with rehabilitation services on mobility goals if consulted  - Perform Range of Motion 2 times a day  - Reposition patient every 2 hours    - Dangle patient 2 times a day  - Stand patient 2 times a day  - Ambulate patient 2 times a day  - Out of bed to chair 2 times a day   - Out of bed for meals 2 times a day  - Out of bed for toileting  - Record patient progress and toleration of activity level   Outcome: Progressing

## 2021-08-31 NOTE — ASSESSMENT & PLAN NOTE
Sinus tachycardia, 's - likely secondary to pain  Continue with home dose metoprolol  EKG - sinus tach  Increased pain medication for better pain control

## 2021-08-31 NOTE — PROGRESS NOTES
Rickey Figueredo  66 y o   male  MR#: [id number]  8/31/2021    Post-op days: 3  Extremity: right hip    Subjective: Patient seen and examined  Lying comfortably in bed  Doing well  Pain is controlled  Denies chest pain, SOB, fever or chills  Vitals:   Vitals:    08/31/21 0717   BP: 149/95   Pulse: (!) 106   Resp: 18   Temp: 98 3 °F (36 8 °C)   SpO2: 94%       Exam:   A&O x 3 NAD  Right hip:  Dressings c/d/i  Thigh swollen but compressible  Calf soft, nontender  NV intact    Labs:   WBC   Recent Labs     08/29/21  0538 08/30/21  0502 08/31/21  0508   WBC 10 06 10 24* 8 39     H/H   Recent Labs     08/29/21  0538 08/30/21  0502 08/31/21  0508   HGB 12 4 11 4* 10 1*   /  Recent Labs     08/29/21  0538 08/30/21  0502 08/31/21  0508   HCT 36 6 35 3* 30 5*     Sed Rate No results for input(s): SEDRATE in the last 72 hours  CRP No results for input(s): CRP in the last 72 hours  Assessment:   S/p right hip TFN    Plan:   WBAT to RLE with assistance  PT/OT  Pain control  DVT prophylaxis: lovenox, mechanical  Recommend Lovenox 40 mg daily x 30 days postoperatively  ABLA: stable  Hbg 10 1 this AM  Continue to monitor vitals and H/H   DC planning  Ortho stable for discharge  Follow up in office with Dr Rosemary Durant in 10-14 days

## 2021-08-31 NOTE — PHYSICAL THERAPY NOTE
PT tx     08/31/21 1000   PT Last Visit   PT Visit Date 08/31/21   Note Type   Note Type Treatment   Pain Assessment   Pain Assessment Tool 0-10   Pain Score 5   Pain Location/Orientation Orientation: Right;Location: Hip  (with movement and wb)   Restrictions/Precautions   Weight Bearing Precautions Per Order Yes   RLE Weight Bearing Per Order WBAT   General   Chart Reviewed Yes   Additional Pertinent History better pain control   Cognition   Overall Cognitive Status WFL   Arousal/Participation Alert   Attention Within functional limits   Orientation Level Oriented X4   Following Commands Follows all commands and directions without difficulty   Subjective   Subjective Pt with better pain management today   Bed Mobility   Supine to Sit 3  Moderate assistance   Additional items Assist x 2   Sit to Supine 2  Maximal assistance   Additional items Assist x 2   Exercises   Ankle Pumps Supine;10 reps;AROM; Right;Left   THR Supine;10 reps;AAROM; Right   Assessment   Prognosis Good   Problem List Decreased strength;Decreased range of motion;Decreased endurance; Impaired balance;Decreased mobility; Decreased coordination;Decreased safety awareness;Pain   Assessment Pt bony ther ex well today  Wnat sto wait for trnasfer/giat tarining until pain meds more effective  Con't PT   Barriers to Discharge   (pain, mobility)   Goals   Patient Goals get better   Plan   Treatment/Interventions ADL retraining;Functional transfer training;LE strengthening/ROM; Elevations; Therapeutic exercise; Endurance training;Patient/family training;Equipment eval/education; Bed mobility;Gait training;Spoke to nursing;Spoke to case management;OT   Progress Progressing toward goals   PT Frequency 5x/wk   Recommendation   PT Discharge Recommendation Post acute rehabilitation services   Equipment Recommended [facility] Recommended Wheeled walker   PT - OK to Discharge Yes   Additional Comments   (to STR with medical clearance)   AM-PAC Basic Mobility Inpatient   Turning in Bed Without Bedrails 2   Lying on Back to Sitting on Edge of Flat Bed 2   Moving Bed to Chair 2   Standing Up From Chair 2   Walk in Room 1   Climb 3-5 Stairs 1   Basic Mobility Inpatient Raw Score 10   Turning Head Towards Sound 4   Follow Simple Instructions 4   Low Function Basic Mobility Raw Score 18   Low Function Basic Mobility Standardized Score 29 25   Lilliam Paiz, PT

## 2021-08-31 NOTE — DISCHARGE SUMMARY
[place]  Discharge- Marcelina Merino 1950, 66 y o  male MRN: [id number]  Unit/Bed#: -Rei Encounter: [id number]  Primary Care Provider: Lucy Burns MD   Date and time admitted to hospital: 8/28/2021  6:16 AM    * Closed fracture of right hip [facility])  Assessment & Plan    · Right hip fracture, sustained after a fall after drinking heavily  ·  Right femur x-ray- right trochanteric femur fracture  · Status post right hip TFN (8/28/21) POD #3  · Postop care per Orthopedics  · DVT prophylaxis- Lovenox  · Pain control p r n   · P T /OT consulted - STR    Alcohol abuse  Assessment & Plan  History of alcohol abuse, drinks 10 ounces of vodka daily  Blood alcohol level 235 on admission  Continue CIWA protocol  Librium 5mg TID - discontinued as patient is not showing signs of withdrawal at this time   On thiamine, folate  Discussed alcohol cessation      Acute urinary retention  Assessment & Plan  · Post op urinary retention, noted 8/28/21 - now resolved  · S/p straight cath 1200cc  · Started on flomax  · Urology on board  · On urinary retention protocol    Sinus tachycardia  Assessment & Plan  Sinus tachycardia, 's - likely secondary to pain  Continue with home dose metoprolol  EKG - sinus tach  Increased pain medication for better pain control    Atelectasis of left lung  Assessment & Plan  · Atelectasis of left lung base, noted on preoperative chest x-ray, in the setting of patient with history of alcohol abuse, requiring use of incentive spirometry postoperatively  · Chest x-ray (08/28/2021)- Chronic moderate left pleural effusion with left basilar atelectasis    · Patient denies being an active smoker  · Continue with incentive spirometry  · Keep oxygen saturation greater than 92%, use supplemental oxygen as needed    Acute blood loss anemia  Assessment & Plan  · Hemoglobin down from 13-10 secondary to surgery  · Patient asymptomatic    Paroxysmal atrial fibrillation [facility])  Assessment & Plan  On metoprolol 50 mg b i d , not on anticoagulation    Ambulatory dysfunction  Assessment & Plan  H/o ambulatory dysfunction, falls exacerbated by alcohol abuse  Pt/OT - STR    Other insomnia  Assessment & Plan  On ambien prn  Patient states he uses alcohol in order to sleep  Discussed ill effects of alcohol and insomnia  Discussed considering alcohol rehab and cognitive behavioral therapy as an outpatient  Medical Problems     Resolved Problems  Date Reviewed: 8/31/2021    None              Discharging Physician / Practitioner: Sue Ling PA-C  PCP: Phyllis Griffiths MD  Admission Date:   Admission Orders (From admission, onward)     Ordered        08/28/21 0745  INPATIENT ADMISSION  Once                   Discharge Date: 08/31/21    Disposition:    Other [facility]    Reason for Admission:  Right hip pain following fall    Discharge Diagnoses:   Please see assessment and plan section above for further details regarding discharge diagnoses  Consultations During Hospital Stay:  · Dr James Titus  · Dr Chung Lab    Procedures Performed:      Right femur XR - No acute osseous abnormality  Patient's known intertrochanteric fracture is not included in the field-of-view      Right hip XR - Intertrochanteric fracture of right femur    Pelvic XR - Intertrochanteric fracture of right femur    CXR - Chronic moderate left pleural effusion with left basilar atelectasis    CT cervical spine - No acute compression collapse of the vertebra seen    CT head - No acute intracranial hemorrhage seen  No extra-axial collection    CT chest - No solid visceral injury seen  Intertrochanteric fracture of the right femur with mild comminution of the greater trochanter and lesser trochanter with varus alignment  Chronic left effusion with rounded atelectasis  No new consolidation    Significant Findings / Test Results:   · See above    Incidental Findings:   · none     Test Results Pending at Discharge (will require follow up):   · none     Outpatient Tests Requested:  · none    Complications:  none    Hospital Course:      Norvin Harada is a 66 y o  male patient who originally presented to the hospital on 8/28/2021 due to right hip pain status post fall  Patient did not lose consciousness or hit his head  Patient was found to have a right intertrochanteric femur fracture  He was seen in consultation by Orthopedic surgery  Patient underwent right femur closed reduction and intramedullary nailing on 8/28  Patient did have some postop urinary retention and was seen by Urology, but this resolved prior to discharge  Patient will be discharged to short-term rehab  Patient also has a history of alcohol abuse  He was started on Librium and CIWA protocol on admission  Librium was discontinued as patient did not show signs of withdrawal   Discussed with the patient alcohol cessation in seeking outpatient alcohol rehab  Patient will be discharged to short-term rehab in stable condition  Condition at Discharge: good    Discharge Day Visit / Exam:   Subjective:  Patient complaining of leg pain  Vitals: Blood Pressure: 149/95 (08/31/21 0717)  Pulse: (!) 106 (08/31/21 0717)  Temperature: 98 3 °F (36 8 °C) (08/31/21 0717)  Temp Source: Oral (08/31/21 0129)  Respirations: 18 (08/31/21 0717)  Height: 6' 5" (195 6 cm) (08/29/21 0656)  Weight - Scale: 111 kg (244 lb 11 4 oz) (08/29/21 0656)  SpO2: 94 % (08/31/21 0717)  Exam:   Physical Exam  Vitals and nursing note reviewed  Constitutional:       Appearance: He is well-developed  HENT:      Head: Normocephalic and atraumatic  Eyes:      Conjunctiva/sclera: Conjunctivae normal    Cardiovascular:      Rate and Rhythm: Normal rate and regular rhythm  Heart sounds: No murmur heard  Pulmonary:      Effort: Pulmonary effort is normal  No respiratory distress  Breath sounds: Normal breath sounds  Abdominal:      Palpations: Abdomen is soft  Tenderness: There is no abdominal tenderness  Musculoskeletal:      Cervical back: Neck supple  Skin:     General: Skin is warm and dry  Neurological:      Mental Status: He is alert  Discussion with Family: wife called with update    Medication Adjustments and Discharge Medications:  · Discharge Medication List: See after visit summary for reconciled discharge medications  · Medication Dosing Tapers - Please refer to Discharge Medication List for details on any medication dosing tapers (if applicable to patient)  · Summary of Medication Adjustments made as a result of this hospitalization:   · Medications being temporarily held (include recommended restart time): Wound Care Recommendations:  When applicable, please see wound care section of After Visit Summary  Instructions for any Catheters / Lines Present at Discharge (including removal date, if applicable):     Diet Recommendations at Discharge:  Diet -        Diet Orders   (From admission, onward)             Start     Ordered    08/28/21 1415  Diet Regular; Regular House  Diet effective now     Question Answer Comment   Diet Type Regular    Regular Regular House        08/28/21 1414                Goals of Care Discussions:  · Code Status at Discharge: Level 1 - Full Code  · Goals of care were not discussed during this admission  Discharge instructions/Information to patient and family:   See after visit summary section titled Discharge Instructions for information provided to patient and family  Planned Readmission: none      Discharge Statement:  I spent 45 minutes discharging the patient  This time was spent on the day of discharge  I had direct contact with the patient on the day of discharge  Greater than 50% of the total time was spent examining patient, answering all patient questions, arranging and discussing plan of care with patient as well as directly providing post-discharge instructions    Additional time then spent on discharge activities  **Please Note: This note may have been constructed using a voice recognition system  **

## 2021-08-31 NOTE — PHYSICAL THERAPY NOTE
PT tx     08/31/21 1055   PT Last Visit   PT Visit Date 08/31/21   Note Type   Note Type Treatment   Pain Assessment   Pain Assessment Tool Ryan-Baker FACES   Ryan-Baker FACES Pain Rating 8   Pain Location/Orientation Orientation: Right;Location: Hip   Restrictions/Precautions   RLE Weight Bearing Per Order WBAT   General   Chart Reviewed Yes   Cognition   Overall Cognitive Status WFL   Orientation Level Oriented X4   Following Commands Follows one step commands with increased time or repetition   Subjective   Subjective ready    Bed Mobility   Supine to Sit 2  Maximal assistance   Additional items Assist x 2; Increased time required;Verbal cues;LE management   Transfers   Sit to Stand 2  Maximal assistance   Additional items Assist x 2; Increased time required;Verbal cues   Stand to Sit 2  Maximal assistance   Additional items Assist x 2; Increased time required;Verbal cues;Armrests   Stand pivot 2  Maximal assistance   Additional items Assist x 2; Increased time required;Verbal cues;Armrests  (rw)   Ambulation/Elevation   Gait pattern Forward Flexion; Inconsistent lucina;Poor UE support; Short stride; Foward flexed; Shuffling; Improper Weight shift  (requires assist to advance R le and wt shift)   Gait Assistance 2  Maximal assist   Additional items Assist x 2;Verbal cues; Tactile cues   Assistive Device Rolling walker   Distance 3'   Balance   Static Sitting Good   Dynamic Sitting Fair -   Static Standing Fair -   Dynamic Standing Poor +   Ambulatory Poor +   Endurance Deficit   Endurance Deficit Yes   Endurance Deficit Description tires quickly   Activity Tolerance   Activity Tolerance Patient limited by fatigue;Patient limited by pain   Medical Staff Made Aware ALYSON Hathaway   Nurse Made Aware KRISTIN Clark   Assessment   Prognosis Good   Problem List Decreased strength;Decreased endurance; Impaired balance;Decreased mobility; Decreased coordination;Decreased safety awareness;Orthopedic restrictions;Decreased skin integrity;Pain   Assessment Pt able to mobilize to chair with rw adn few steps Max Ax2  Requires assist to advance R foot and for wt shift, cues for increased ue wt bearing  Con't PT tent transfer to rehab this PM    Barriers to Discharge Inaccessible home environment;Decreased caregiver support   Goals   Patient Goals getbetter  walk   Plan   Treatment/Interventions ADL retraining;Functional transfer training;LE strengthening/ROM; Elevations; Therapeutic exercise; Endurance training;Patient/family training;Equipment eval/education; Bed mobility;Gait training;Spoke to case management;Spoke to nursing;OT   Progress Progressing toward goals   PT Frequency 7x/wk; Twice a day  (at rehab)   Recommendation   PT Discharge Recommendation Post acute rehabilitation services   Equipment Recommended [address] [facility] Recommended Wheeled walker   PT - OK to Discharge Yes   Additional Comments   (to in-pt rehab)   Chidi 8 in Bed Without Bedrails 2   Lying on Back to Sitting on Edge of Flat Bed 2   Moving Bed to Chair 2   Standing Up From Chair 2   Walk in Room 1   Climb 3-5 Stairs 1   Basic Mobility Inpatient Raw Score 10   Turning Head Towards Sound 4   Follow Simple Instructions 4   Low Function Basic Mobility Raw Score 18   Low Function Basic Mobility Standardized Score 29 25   Melissa Gilliam, PT

## 2021-08-31 NOTE — TRANSPORTATION MEDICAL NECESSITY
Section I - General Information    Name of Patient: Horace Hitchcock                 : 1950    Medicare #: [id number]  Transport Date: 21 (PCS is valid for round trips on this date and for all repetitive trips in the 60-day range as noted below )  Origin: [facility] MED SURG UNIT                                                         Destination: [facility]  Is the pt's stay covered under Medicare Part A (PPS/DRG)   []     Closest appropriate facility? If no, why is transport to more distant facility required? Yes  If hospice pt, is this transport related to pt's terminal illness? NA       Section II - Medical Necessity Questionnaire  Ambulance transportation is medically necessary only if other means of transport are contraindicated or would be potentially harmful to the patient  To meet this requirement, the patient must either be "bed confined" or suffer from a condition such that transport by means other than ambulance is contraindicated by the patient's condition  The following questions must be answered by the medical professional signing below for this form to be valid:    1)  Describe the MEDICAL CONDITION (physical and/or mental) of this patient AT [address] [place] [address] that requires the patient to be transported in an ambulance and why transport by other means is contraindicated by the patient's condition Right Hip FX/Pain/Unable to tolerate sitting in [facility] for transportation due to pain     2) Is the patient "bed confined" as defined below? Yes  To be "be confined" the patient must satisfy all three of the following conditions: (1) unable to get up from bed without Assistance; AND (2) unable to ambulate; AND (3) unable to sit in a chair or wheelchair  3) Can this patient safely be transported by car or wheelchair van (i e , seated during transport without a medical attendant or monitoring)?    No    4) In addition to completing questions 1-3 above, please check any of the following conditions that apply*:   *Note: supporting documentation for any boxes checked must be maintained in the patient's medical records  If hosp-hosp transfer, describe services needed at 2nd facility not available at 1st facility? Non-Healed Fractures  Moderate/severe pain on movement   Unable to tolerate seated position for time needed to transport       Section III - Signature of Physician or Healthcare Professional  I certify that the above information is true and correct based on my evaluation of this patient, and represent that the patient requires transport by ambulance and that other forms of transport are contraindicated  I understand that this information will be used by the Centers for Medicare and Medicaid Services (CMS) to support the determination of medical necessity for ambulance services, and I represent that I have personal knowledge of the patient's condition at time of transport  []  If this box is checked, I also certify that the patient is physically or mentally incapable of signing the ambulance service's claim and that the institution with which I am affiliated has furnished care, services, or assistance to the patient  My signature below is made on behalf of the patient pursuant to 42 CFR §424 36(b)(4)  In accordance with 42 CFR §424 37, the specific reason(s) that the patient is physically or mentally incapable of signing the claim form is as follows:       Signature of Physician* or Healthcare Professional______________________________________________________________  Signature Date 08/31/21 (For scheduled repetitive transports, this form is not valid for transports performed more than 60 days after this date)    Printed Name & Credentials of Physician or Healthcare Professional (MD, DO, RN, etc )_________Jen Michaels RN_______________________  *Form must be signed by patient's attending physician for scheduled, repetitive transports   For non-repetitive, unscheduled ambulance transports, if unable to obtain the signature of the attending physician, any of the following may sign (choose appropriate option below)  [] Physician Assistant []  Clinical Nurse Specialist []  Registered Nurse  []  Nurse Practitioner  [x] Discharge Planner

## 2021-08-31 NOTE — PLAN OF CARE
Problem: OCCUPATIONAL THERAPY ADULT  Goal: Performs self-care activities at highest level of function for planned discharge setting  See evaluation for individualized goals  Description: Treatment Interventions: ADL retraining, Functional transfer training, Endurance training, Equipment evaluation/education, Compensatory technique education          See flowsheet documentation for full assessment, interventions and recommendations  Note: Limitation: Decreased ADL status, Decreased self-care trans, Decreased high-level ADLs  Prognosis: Good  Assessment: Pt seen for OT tx session with focus on functional balance, functional mobility, ADL status, and transfer safety  Patient agreeable to OT treatment session  Pt received supine in bed  Pt increasingly able to tolerate activity  Performed transfers with max A x 2  Pt with increased difficulty weightshifting in standing   Patient continues to be functioning below baseline level, occupational performance remains limited secondary to factors listed above, and pt at increased risk for falls and injury  The patient's raw score on the AM-PAC Daily Activity inpatient short form is 18, standardized score is 38 66, less than 39 4  Patients at this level are likely to benefit from DC to post-acute rehabilitation services  Please refer to the recommendation of the Occupational Therapist for safe DC planning  From OT standpoint, recommendation at time of d/c would be Short Term Rehab  Patient to benefit from continued Occupational Therapy treatment while in the hospital to address deficits as defined above and maximize level of functional independence with ADLs and functional mobility  Pt left with call bell in reach, tray table in reach, needs met       OT Discharge Recommendation: Post acute rehabilitation services

## 2021-08-31 NOTE — PLAN OF CARE
Problem: PHYSICAL THERAPY ADULT  Goal: Performs mobility at highest level of function for planned discharge setting  See evaluation for individualized goals  Description: Treatment/Interventions: ADL retraining, Functional transfer training, LE strengthening/ROM, Elevations, Therapeutic exercise, Endurance training, Patient/family training, Equipment eval/education, Bed mobility, Gait training, Spoke to nursing, Spoke to case management, OT  Equipment Recommended: Linard Bernheim       See flowsheet documentation for full assessment, interventions and recommendations  Outcome: Progressing  Note: Prognosis: Good  Problem List: Decreased strength, Decreased endurance, Impaired balance, Decreased mobility, Decreased coordination, Decreased safety awareness, Orthopedic restrictions, Decreased skin integrity, Pain  Assessment: Pt able to mobilize to chair with rw adn few steps Max Ax2  Requires assist to advance R foot and for wt shift, cues for increased ue wt bearing  Con't PT tent transfer to rehab this PM   Barriers to Discharge: Inaccessible home environment, Decreased caregiver support        PT Discharge Recommendation: Post acute rehabilitation services     PT - OK to Discharge: Yes    See flowsheet documentation for full assessment

## 2021-08-31 NOTE — CASE MANAGEMENT
LOS: 3 days    Met with patient at bedside to discuss SNF referrals- discussed acute rehab and referrals  Preferance is [facility]  Patient requests that wife be called to discuss discharge planning  Call to wife Midge and discussed same  Referral made to [facility] and they can accept referral and have a bed to offer today  Will need a COVID  [facility] call report to [phone] and fax [phone]  Transportation scheduled with Hermelindo Kennedy at [address] for 1330 today  Notification to Mike Yates PA-C and nurse John Whitmore  Update to Ashvin Cool at [facility], Patient and wife

## 2021-09-01 LAB
ATRIAL RATE: 77 BPM
P AXIS: 60 DEGREES
PR INTERVAL: 174 MS
QRS AXIS: 49 DEGREES
QRSD INTERVAL: 104 MS
QT INTERVAL: 424 MS
QTC INTERVAL: 479 MS
T WAVE AXIS: -6 DEGREES
VENTRICULAR RATE: 77 BPM

## 2021-09-01 PROCEDURE — 93010 ELECTROCARDIOGRAM REPORT: CPT | Performed by: INTERNAL MEDICINE

## 2021-09-07 ENCOUNTER — PATIENT OUTREACH (OUTPATIENT)
Dept: CASE MANAGEMENT | Facility: OTHER | Age: 71
End: 2021-09-07

## 2021-09-07 ENCOUNTER — TELEPHONE (OUTPATIENT)
Dept: OBGYN CLINIC | Facility: HOSPITAL | Age: 71
End: 2021-09-07

## 2021-09-07 NOTE — PROGRESS NOTES
Chart review completed  Email sent to facility requesting update on patient  This care manager assistant will continue to monitor via chart review throughout bundle episode  This CM assistant receieved an update on the patient  The patient has a discharge of 9/9/21 to home with [facility]  This care manager assistant will continue to monitor via chart review throughout bundle episode

## 2021-09-07 NOTE — TELEPHONE ENCOUNTER
Appt scheduled for PO with Dr Jeanette Cook 9/13 10:45am   [facility] notified and msg left to call back to confirm appt  [phone]

## 2021-09-09 ENCOUNTER — PATIENT OUTREACH (OUTPATIENT)
Dept: CASE MANAGEMENT | Facility: OTHER | Age: 71
End: 2021-09-09

## 2021-09-09 NOTE — PROGRESS NOTES
Chart review complete the patient has a discharge of today 9/9/21 to Home with [facility] AT [facility]  Email sent to facility requesting update on patient  This care manager assistant will continue to monitor via chart review throughout bundle episode

## 2021-09-10 ENCOUNTER — PATIENT OUTREACH (OUTPATIENT)
Dept: CASE MANAGEMENT | Facility: OTHER | Age: 71
End: 2021-09-10

## 2021-09-10 NOTE — PROGRESS NOTES
This CM Assistant received an update on the patient  The patient is discharging today 9/10/21 to Home with [facility]  A email was sent to the facility requesting discharge instructions  When CM assistant has received the Discharge paperwork CM assistant will attach to this episode  I have removed myself off of the care team, added the CM to the care team who will follow the patient through the bundle episode, sent the care manager a inbasket notifying them of the bundle episode, updated the BPCI form, and updated the care coordination note

## 2021-09-13 ENCOUNTER — TELEPHONE (OUTPATIENT)
Dept: FAMILY MEDICINE CLINIC | Facility: HOSPITAL | Age: 71
End: 2021-09-13

## 2021-09-13 ENCOUNTER — PATIENT OUTREACH (OUTPATIENT)
Dept: FAMILY MEDICINE CLINIC | Facility: HOSPITAL | Age: 71
End: 2021-09-13

## 2021-09-13 ENCOUNTER — OFFICE VISIT (OUTPATIENT)
Dept: OBGYN CLINIC | Facility: CLINIC | Age: 71
End: 2021-09-13

## 2021-09-13 ENCOUNTER — APPOINTMENT (OUTPATIENT)
Dept: RADIOLOGY | Facility: CLINIC | Age: 71
End: 2021-09-13
Payer: MEDICARE

## 2021-09-13 VITALS
DIASTOLIC BLOOD PRESSURE: 70 MMHG | BODY MASS INDEX: 28.81 KG/M2 | HEIGHT: 77 IN | SYSTOLIC BLOOD PRESSURE: 112 MMHG | WEIGHT: 244 LBS

## 2021-09-13 DIAGNOSIS — Z48.89 AFTERCARE FOLLOWING SURGERY: ICD-10-CM

## 2021-09-13 DIAGNOSIS — Z48.89 AFTERCARE FOLLOWING SURGERY: Primary | ICD-10-CM

## 2021-09-13 DIAGNOSIS — Z71.89 COMPLEX CARE COORDINATION: Primary | ICD-10-CM

## 2021-09-13 PROCEDURE — 73552 X-RAY EXAM OF FEMUR 2/>: CPT

## 2021-09-13 PROCEDURE — 99024 POSTOP FOLLOW-UP VISIT: CPT | Performed by: ORTHOPAEDIC SURGERY

## 2021-09-13 RX ORDER — OXYCODONE HYDROCHLORIDE 5 MG/1
TABLET ORAL
COMMUNITY
Start: 2021-09-10 | End: 2021-09-17 | Stop reason: SDUPTHER

## 2021-09-13 RX ORDER — OXYCODONE HYDROCHLORIDE 10 MG/1
TABLET ORAL
COMMUNITY
Start: 2021-09-10 | End: 2021-09-17 | Stop reason: ALTCHOICE

## 2021-09-13 NOTE — PROGRESS NOTES
PROGRESS NOTE:  Post-operative Visit  Date of Surgery: 8/28/21    SUBJECTIVE:  Patient returns for follow-up, status post R femur IMN     The pain is mild and moderate  Medication for pain is tylenol, NSAID and narcotic  Gait: sitting in wheelchair  Assistive device:  walker and wheelchair  Reports he is doing okay  States he has not been drinking  Reports he is using a walker with PT but uses wheelchair for longer distances  Mild pain with standing, moderate pain with walking  OBJECTIVE:  Hip wounds are appropriate  No erythema, drainage or obvious signs of infection  Neurovascular status is normal        Sensation Intact to Light Touch in Sural, Saphenous, Tibial, Superficial Peroneal, and Deep Peroneal Nerve Distribution  Motor function 5 out of 5 strength in Tibialis Anterior, Gastrocnemius, Soleus, Extensor Hallucis Longus, and Flexor Hallucis Longus Muscles  Extremity Warm and Well Perfused  Brisk Capillary Refill in Toes  RADIOGRAPHS:  Stable, well fixed, well aligned IMN without complication  ASSESSMENT & PLAN:  - Continue standard postop total hip protocol, outpatient physical therapy, and wean pain medication       - Follow-up in 4-6 weeks, with XR and for clinical check  - recommend nutrition supplementation (boost, etc)  - recommend Ca, Vit D supplements  - recommend tylenol, NSAIDs for pain  - do NOT recommend narcotics    Discussed with patient that should any issues/questions/concerns arise they should phone the office  Kary Vasquez MD  Adult Reconstruction  Department of Orthopaedic Surgery  [address]  11:08 AM

## 2021-09-13 NOTE — PROGRESS NOTES
Outpatient Care Management Note:    New BPCI referral received  Corrine Madsen was discharged from [facility] on 9/10  Care manager called Corrine Madsen and spoke with his wife, Renny Mendenhall  She is listed on his communication form  [facility]  He is also being seen by an RN who visited on the weekend  Corrine Madsen is scheduled to see orthopedics today  I instructed her to call Dr Mello Rivers office to schedule a hospital follow up  Kevinalma is managing all of Obinna's medications  We completed a med review  She states he was also discharged from [facility] with oxycodone 5 mg and 10 mg tablets  He is using the 5 mg during the day and the 10 mg at bedtime  Saskia noted that he has a history of alcoholism  The drinking caused his fall  He has not had a drink in 3 weeks and she will not allow him to have alcohol during his rehab from his injury  CM reviewed the risks associated with alcohol, pain medications and sleeping medicines  We discussed a referral to social work  Kevinalma states he has not been receptive to rehab or resources in the past, but she agreed to [facility] placing a referral      AILEEN gave Saskia my contact information  She knows to call [facility] directly for any urgent concerns

## 2021-09-13 NOTE — PROGRESS NOTES
I am under the impression that JORDAN after rehab can only be done/billed by PCP! Please also ask him to see me for JORDAN  Am I correct Fredia Screen?

## 2021-09-14 ENCOUNTER — TRANSITIONAL CARE MANAGEMENT (OUTPATIENT)
Dept: FAMILY MEDICINE CLINIC | Facility: HOSPITAL | Age: 71
End: 2021-09-14

## 2021-09-15 ENCOUNTER — PATIENT OUTREACH (OUTPATIENT)
Dept: FAMILY MEDICINE CLINIC | Facility: HOSPITAL | Age: 71
End: 2021-09-15

## 2021-09-15 DIAGNOSIS — G47.09 OTHER INSOMNIA: ICD-10-CM

## 2021-09-15 RX ORDER — ZOLPIDEM TARTRATE 6.25 MG/1
6.25 TABLET, FILM COATED, EXTENDED RELEASE ORAL
Qty: 30 TABLET | Refills: 0 | Status: SHIPPED | OUTPATIENT
Start: 2021-09-15 | End: 2021-09-17 | Stop reason: ALTCHOICE

## 2021-09-15 NOTE — PROGRESS NOTES
IVAN Gallagher received referral from KRISTIN Mg to offer patient Alcohol recovery resources  IVAN GALLAGHER called patient ([phone]) and spoke with patient and his wife, Rhea ChadwickCarsonalpa LOVE CM received verbal consent form patient to speak with his wife, Rhea ChadwickCarsonalpa LOVE CM introduced IVAN GALLAGHER role  IVAN GALLAGHER confirmed patient's address, contact information, emergency contacts and insurance  IVAN GALLAGHER completed assessment with patient  Patient is , retired and receives a pension and SSI monthly  Patient and Midge report no financial issues  Midge confirmed that they always have enough food at home and are always able to pay their bills  Patient utilizes a RW to ambulate and is independent with all ADLs  Patient is currently receiving VNA services through [facility]  Patient considers his wife and two children his support and speaks with them everyday  Patient struggles with alcohol abuse  However, patient's wife stated he has not had any alcohol since he returned home  IVAN GALLAGHER discussed resources available to support patient in his recovery  Patient is not interested  Patient is not a  and denies any mental health history  Patient has a car and his wife can transport him as needed  IVAN GALLAGHER provided Midge with psychosocial support as she feels overwhelmed with the physical rehab process and the 'long road' ahead  Midge stated that her children are a very big support for her and provide her breaks as needed  Midge and patient had no other questions, concerns or needs  IVAN GALLAGHER encouraged both patient and Midge to contact IVAN GALLAGHER for any future needs  Please re consult IVAN GALLAGHER as needed

## 2021-09-17 ENCOUNTER — OFFICE VISIT (OUTPATIENT)
Dept: FAMILY MEDICINE CLINIC | Facility: HOSPITAL | Age: 71
End: 2021-09-17
Payer: MEDICARE

## 2021-09-17 VITALS
HEART RATE: 71 BPM | DIASTOLIC BLOOD PRESSURE: 70 MMHG | BODY MASS INDEX: 28.22 KG/M2 | WEIGHT: 239 LBS | HEIGHT: 77 IN | OXYGEN SATURATION: 98 % | SYSTOLIC BLOOD PRESSURE: 110 MMHG

## 2021-09-17 DIAGNOSIS — I25.10 CORONARY ARTERY DISEASE INVOLVING NATIVE CORONARY ARTERY OF NATIVE HEART WITHOUT ANGINA PECTORIS: ICD-10-CM

## 2021-09-17 DIAGNOSIS — F10.10 ALCOHOL ABUSE: ICD-10-CM

## 2021-09-17 DIAGNOSIS — I48.0 PAROXYSMAL ATRIAL FIBRILLATION (HCC): ICD-10-CM

## 2021-09-17 DIAGNOSIS — S72.001A CLOSED FRACTURE OF RIGHT HIP, INITIAL ENCOUNTER (HCC): Primary | ICD-10-CM

## 2021-09-17 DIAGNOSIS — K21.9 GASTROESOPHAGEAL REFLUX DISEASE WITHOUT ESOPHAGITIS: ICD-10-CM

## 2021-09-17 DIAGNOSIS — G47.09 OTHER INSOMNIA: ICD-10-CM

## 2021-09-17 DIAGNOSIS — I10 ESSENTIAL HYPERTENSION: ICD-10-CM

## 2021-09-17 DIAGNOSIS — I83.893 VARICOSE VEINS OF BOTH LEGS WITH EDEMA: ICD-10-CM

## 2021-09-17 PROCEDURE — 99496 TRANSJ CARE MGMT HIGH F2F 7D: CPT | Performed by: INTERNAL MEDICINE

## 2021-09-17 RX ORDER — CELECOXIB 200 MG/1
200 CAPSULE ORAL
Qty: 30 CAPSULE | Refills: 0 | Status: SHIPPED | OUTPATIENT
Start: 2021-09-17 | End: 2021-10-11 | Stop reason: SDUPTHER

## 2021-09-17 RX ORDER — OXYCODONE HYDROCHLORIDE 5 MG/1
5 TABLET ORAL DAILY PRN
Qty: 10 TABLET | Refills: 0 | Status: SHIPPED | OUTPATIENT
Start: 2021-09-17 | End: 2021-10-19 | Stop reason: SDUPTHER

## 2021-09-17 NOTE — ASSESSMENT & PLAN NOTE
Pt's wife assured me that she would not allow pt to drink alcohol during his rehab     I offered naltrexone to pt to help him abstain from alcohol and will revisit this at the next appointment as per the family's request

## 2021-09-17 NOTE — PROGRESS NOTES
Assessment/Plan:     Closed fracture of right hip (HCC)  Pt's undergoing home physical therapy after right hip fracture  He still has significant pain  Will wean oxycodone to 5mg before PT and start celebrex 200mg in am for 30 days for pain control and enable him to work with PT    Varicose veins of both legs with edema  B/L E edema is due to varicose veins and sitting more  Doubt chf, VTE  Will try elevating the legs, compression stockings and will increase lasix to 40mg in am  Will reassess pt in 4 weeks    Paroxysmal atrial fibrillation (HCC)  Pt has hx of afib  Heart rhythm is regular today   Continue toprol 50 bid and aspirin    Coronary artery disease involving native coronary artery of native heart without angina pectoris  Pt denies chest discomfort  Has hx of CAD  Continue aspirin, toprol and pravastatin    Essential hypertension  Pt has chronic hypertension  BP is controlled  Continue toprol and lasix    Gastroesophageal reflux disease without esophagitis  Pt has gerd  Denies symptoms of gerd, dysphagia or signs of gi bleed  Continue pantoprazole    Alcohol abuse  Pt's wife assured me that she would not allow pt to drink alcohol during his rehab  I offered naltrexone to pt to help him abstain from alcohol and will revisit this at the next appointment as per the family's request    Other insomnia  Pt has chronic insomnia: difficulty falling asleep as well as maintaining sleep for yrs  He claims he used alcohol to help him sleep  Wife reports that he has apneic episodes and has never had a sleep study  Will arrange for home sleep study after his recovery as per their wish  I told pt that taking Naz Iron may cause him to die when he drinks alcohol and the risks of taking ambien outweigh the benefits in his case therefore I will not prescribe more ambien for him! Will refer to sleep specialist once he has recovered and is more mobile         Diagnoses and all orders for this visit:    Closed fracture of right hip, initial encounter ([facility] [address] )  -     oxyCODONE (ROXICODONE) 5 mg immediate release tablet; Take 1 tablet (5 mg total) by mouth daily as needed for moderate pain (before physical therapy session)Max Daily Amount: 5 mg  -     celecoxib (CeleBREX) 200 mg capsule; Take 1 capsule (200 mg total) by mouth daily with breakfast    Essential hypertension    Coronary artery disease involving native coronary artery of native heart without angina pectoris    Paroxysmal atrial fibrillation (HCC)    Other insomnia    Alcohol abuse    Varicose veins of both legs with edema  -     Elastic Bandages & Supports (Medical Compression Stockings) MISC; Use daily Wear them during the day and off at night    Gastroesophageal reflux disease without esophagitis         Subjective:     Patient ID: Marcelina Merino is a 66 y o  male  Patient presents with his wife for transitional care management after discharge from the nursing on September 10th  He fell at home and broke his right hip underwent surgery  He was discharged to SNF for rehab and was released home  Home physical therapy has been working with patient  He complains of right hip pain worse when he undergoes physical therapy  His wife told me that he had not drunk since coming home because she did not allow him  Patient complains of right hip pain when walking with PT and walker  He denies constipation, urinary obstruction, signs of GI or  bleeding  He also complains of bilateral lower extremity swelling since surgery  Denies chest pain, pleurisy, shortness of breath at rest or with exertion  He has chronic insomnia:  Difficulty falling asleep and maintaining sleep  Alcohol helped him sleepy claims  He has not undergone alcohol withdrawal and has had no seizures after he stops drinking about 3 weeks ago  Review of Systems   Constitutional: Negative for fever  Respiratory: Negative for cough and shortness of breath      Cardiovascular: Negative for chest pain, palpitations and leg swelling  Gastrointestinal: Negative for abdominal pain, blood in stool and constipation  Genitourinary: Negative for difficulty urinating and hematuria  Neurological: Negative for tremors, weakness and numbness  Psychiatric/Behavioral: Negative for dysphoric mood (Patient denies being depressed)  Objective:     Physical Exam  Constitutional:       General: He is not in acute distress  Appearance: He is not ill-appearing or toxic-appearing  HENT:      Head: Normocephalic  Eyes:      Conjunctiva/sclera: Conjunctivae normal    Cardiovascular:      Rate and Rhythm: Normal rate and regular rhythm  Heart sounds: No murmur heard  Pulmonary:      Effort: No respiratory distress  Breath sounds: No wheezing or rales  Abdominal:      General: Bowel sounds are normal  There is no distension  Palpations: Abdomen is soft  Musculoskeletal:         General: Tenderness (Right hip area is tender) present  Skin:     General: Skin is warm and dry  Comments: Surgical incisions are well healed without signs of cellulitis at the right hip  He has bilateral varicose veins lower extremities with  1+ bilateral edema of the feet and lower legs      Neurological:      Mental Status: He is alert and oriented to person, place, and time  Comments: Patient has no tremors, he moves all extremities   Psychiatric:         Mood and Affect: Mood normal            Vitals:    09/17/21 1418   BP: 110/70   Pulse: 71   SpO2: 98%   Weight: 108 kg (239 lb)   Height: 6' 5" (1 956 m)       Transitional Care Management Review:  Rickey Figueredo is a 66 y o  male here for TCM follow up  During the TCM phone call patient stated:    TCM Call (since 8/17/2021)     Date and time call was made  9/14/2021  2:16 PM    Patient was hospitialized at  [address] [place] [address]  d/c' from [facility] to [facility] rehab on 8/31/21      Date of Admission  08/28/21    Date of discharge 09/10/21    Diagnosis  falls--surgery on hip    Were the patients medications reviewed and updated  Yes      TCM Call (since 8/17/2021)     Should patient be enrolled in anticoag monitoring? No    Scheduled for follow up? Yes    I have advised the patient to call PCP with any new or worsening symptoms  Wilmar Fuentes MA [facility] [address]    Comments  Spoke to wife  Pt doing well  Meds reviewed with wife, chart updated  Has TCM scheduled for 9/17/21 with Dr Glenis Tang MD

## 2021-09-17 NOTE — ASSESSMENT & PLAN NOTE
B/L E edema is due to varicose veins and sitting more  Doubt chf, VTE      Will try elevating the legs, compression stockings and will increase lasix to 40mg in am  Will reassess pt in 4 weeks

## 2021-09-17 NOTE — ASSESSMENT & PLAN NOTE
Pt's undergoing home physical therapy after right hip fracture  He still has significant pain    Will wean oxycodone to 5mg before PT and start celebrex 200mg in am for 30 days for pain control and enable him to work with PT

## 2021-09-17 NOTE — ASSESSMENT & PLAN NOTE
Pt has chronic insomnia: difficulty falling asleep as well as maintaining sleep for yrs  He claims he used alcohol to help him sleep  Wife reports that he has apneic episodes and has never had a sleep study  Will arrange for home sleep study after his recovery as per their wish  I told pt that taking Naz Iron may cause him to die when he drinks alcohol and the risks of taking ambien outweigh the benefits in his case therefore I will not prescribe more ambien for him! Will refer to sleep specialist once he has recovered and is more mobile

## 2021-09-21 ENCOUNTER — PATIENT OUTREACH (OUTPATIENT)
Dept: FAMILY MEDICINE CLINIC | Facility: HOSPITAL | Age: 71
End: 2021-09-21

## 2021-09-21 ENCOUNTER — TELEPHONE (OUTPATIENT)
Dept: FAMILY MEDICINE CLINIC | Facility: HOSPITAL | Age: 71
End: 2021-09-21

## 2021-09-21 NOTE — TELEPHONE ENCOUNTER
Naomie [facility] homecare nurse calling to report patient is having leg cramps in Right lower leg  Dr Alex Umanzor recently increased his Lasix  She is asking if Dr Alex Umanzor would like to increase his Potassium? Or order labs? She is asking for return call with Dr Alex Umanzor instructions

## 2021-09-24 ENCOUNTER — TELEPHONE (OUTPATIENT)
Dept: FAMILY MEDICINE CLINIC | Facility: HOSPITAL | Age: 71
End: 2021-09-24

## 2021-09-24 DIAGNOSIS — S72.001A CLOSED FRACTURE OF RIGHT HIP, INITIAL ENCOUNTER (HCC): Primary | ICD-10-CM

## 2021-10-04 ENCOUNTER — EVALUATION (OUTPATIENT)
Dept: PHYSICAL THERAPY | Facility: CLINIC | Age: 71
End: 2021-10-04
Payer: MEDICARE

## 2021-10-04 DIAGNOSIS — Z98.890 STATUS POST HIP SURGERY: Primary | ICD-10-CM

## 2021-10-04 DIAGNOSIS — S72.001D ENCOUNTER FOR AFTERCARE FOR HEALING CLOSED TRAUMATIC FRACTURE OF RIGHT HIP: ICD-10-CM

## 2021-10-04 PROCEDURE — 97161 PT EVAL LOW COMPLEX 20 MIN: CPT | Performed by: PHYSICAL THERAPIST

## 2021-10-04 PROCEDURE — 97140 MANUAL THERAPY 1/> REGIONS: CPT | Performed by: PHYSICAL THERAPIST

## 2021-10-06 ENCOUNTER — OFFICE VISIT (OUTPATIENT)
Dept: PHYSICAL THERAPY | Facility: CLINIC | Age: 71
End: 2021-10-06
Payer: MEDICARE

## 2021-10-06 DIAGNOSIS — Z98.890 STATUS POST HIP SURGERY: Primary | ICD-10-CM

## 2021-10-06 DIAGNOSIS — S72.001D ENCOUNTER FOR AFTERCARE FOR HEALING CLOSED TRAUMATIC FRACTURE OF RIGHT HIP: ICD-10-CM

## 2021-10-06 PROCEDURE — 97110 THERAPEUTIC EXERCISES: CPT

## 2021-10-06 PROCEDURE — 97140 MANUAL THERAPY 1/> REGIONS: CPT

## 2021-10-08 ENCOUNTER — PATIENT OUTREACH (OUTPATIENT)
Dept: FAMILY MEDICINE CLINIC | Facility: HOSPITAL | Age: 71
End: 2021-10-08

## 2021-10-11 ENCOUNTER — OFFICE VISIT (OUTPATIENT)
Dept: PHYSICAL THERAPY | Facility: CLINIC | Age: 71
End: 2021-10-11
Payer: MEDICARE

## 2021-10-11 DIAGNOSIS — S72.001D ENCOUNTER FOR AFTERCARE FOR HEALING CLOSED TRAUMATIC FRACTURE OF RIGHT HIP: ICD-10-CM

## 2021-10-11 DIAGNOSIS — K21.9 GASTROESOPHAGEAL REFLUX DISEASE WITHOUT ESOPHAGITIS: ICD-10-CM

## 2021-10-11 DIAGNOSIS — S72.001A CLOSED FRACTURE OF RIGHT HIP, INITIAL ENCOUNTER (HCC): ICD-10-CM

## 2021-10-11 DIAGNOSIS — Z98.890 STATUS POST HIP SURGERY: Primary | ICD-10-CM

## 2021-10-11 PROCEDURE — 97140 MANUAL THERAPY 1/> REGIONS: CPT

## 2021-10-11 PROCEDURE — 97112 NEUROMUSCULAR REEDUCATION: CPT

## 2021-10-11 PROCEDURE — 97110 THERAPEUTIC EXERCISES: CPT

## 2021-10-11 RX ORDER — CELECOXIB 200 MG/1
200 CAPSULE ORAL
Qty: 30 CAPSULE | Refills: 5 | Status: SHIPPED | OUTPATIENT
Start: 2021-10-11 | End: 2021-10-19 | Stop reason: ALTCHOICE

## 2021-10-11 RX ORDER — PANTOPRAZOLE SODIUM 40 MG/1
40 TABLET, DELAYED RELEASE ORAL DAILY
Qty: 30 TABLET | Refills: 5 | Status: SHIPPED | OUTPATIENT
Start: 2021-10-11 | End: 2022-04-06

## 2021-10-13 ENCOUNTER — APPOINTMENT (OUTPATIENT)
Dept: RADIOLOGY | Facility: CLINIC | Age: 71
End: 2021-10-13
Payer: MEDICARE

## 2021-10-13 ENCOUNTER — OFFICE VISIT (OUTPATIENT)
Dept: OBGYN CLINIC | Facility: CLINIC | Age: 71
End: 2021-10-13

## 2021-10-13 VITALS
HEIGHT: 77 IN | WEIGHT: 244 LBS | SYSTOLIC BLOOD PRESSURE: 120 MMHG | BODY MASS INDEX: 28.81 KG/M2 | DIASTOLIC BLOOD PRESSURE: 82 MMHG

## 2021-10-13 DIAGNOSIS — Z47.89 AFTERCARE FOLLOWING SURGERY OF THE MUSCULOSKELETAL SYSTEM: Primary | ICD-10-CM

## 2021-10-13 DIAGNOSIS — Z47.89 AFTERCARE FOLLOWING SURGERY OF THE MUSCULOSKELETAL SYSTEM: ICD-10-CM

## 2021-10-13 PROCEDURE — 99024 POSTOP FOLLOW-UP VISIT: CPT | Performed by: ORTHOPAEDIC SURGERY

## 2021-10-13 PROCEDURE — 73502 X-RAY EXAM HIP UNI 2-3 VIEWS: CPT

## 2021-10-13 RX ORDER — CYCLOBENZAPRINE HCL 10 MG
10 TABLET ORAL
Qty: 20 TABLET | Refills: 0 | Status: SHIPPED | OUTPATIENT
Start: 2021-10-13 | End: 2021-10-13

## 2021-10-14 ENCOUNTER — OFFICE VISIT (OUTPATIENT)
Dept: PHYSICAL THERAPY | Facility: CLINIC | Age: 71
End: 2021-10-14
Payer: MEDICARE

## 2021-10-14 DIAGNOSIS — S72.001D ENCOUNTER FOR AFTERCARE FOR HEALING CLOSED TRAUMATIC FRACTURE OF RIGHT HIP: ICD-10-CM

## 2021-10-14 DIAGNOSIS — Z98.890 STATUS POST HIP SURGERY: Primary | ICD-10-CM

## 2021-10-14 PROCEDURE — 97112 NEUROMUSCULAR REEDUCATION: CPT

## 2021-10-14 PROCEDURE — 97110 THERAPEUTIC EXERCISES: CPT

## 2021-10-15 ENCOUNTER — LAB (OUTPATIENT)
Dept: LAB | Facility: HOSPITAL | Age: 71
End: 2021-10-15
Attending: INTERNAL MEDICINE
Payer: MEDICARE

## 2021-10-15 DIAGNOSIS — F10.10 ALCOHOL ABUSE: ICD-10-CM

## 2021-10-15 LAB
ALBUMIN SERPL BCP-MCNC: 3.5 G/DL (ref 3.5–5)
ALP SERPL-CCNC: 102 U/L (ref 46–116)
ALT SERPL W P-5'-P-CCNC: 18 U/L (ref 12–78)
ANION GAP SERPL CALCULATED.3IONS-SCNC: 2 MMOL/L (ref 4–13)
AST SERPL W P-5'-P-CCNC: 13 U/L (ref 5–45)
BILIRUB SERPL-MCNC: 0.4 MG/DL (ref 0.2–1)
BUN SERPL-MCNC: 17 MG/DL (ref 5–25)
CALCIUM SERPL-MCNC: 9.5 MG/DL (ref 8.3–10.1)
CHLORIDE SERPL-SCNC: 106 MMOL/L (ref 100–108)
CO2 SERPL-SCNC: 28 MMOL/L (ref 21–32)
CREAT SERPL-MCNC: 1.34 MG/DL (ref 0.6–1.3)
GFR SERPL CREATININE-BSD FRML MDRD: 52 ML/MIN/1.73SQ M
GLUCOSE SERPL-MCNC: 114 MG/DL (ref 65–140)
MAGNESIUM SERPL-MCNC: 2.3 MG/DL (ref 1.6–2.6)
POTASSIUM SERPL-SCNC: 3.9 MMOL/L (ref 3.5–5.3)
PROT SERPL-MCNC: 8.2 G/DL (ref 6.4–8.2)
SODIUM SERPL-SCNC: 136 MMOL/L (ref 136–145)

## 2021-10-15 PROCEDURE — 36415 COLL VENOUS BLD VENIPUNCTURE: CPT

## 2021-10-15 PROCEDURE — 83735 ASSAY OF MAGNESIUM: CPT

## 2021-10-15 PROCEDURE — 80053 COMPREHEN METABOLIC PANEL: CPT

## 2021-10-18 ENCOUNTER — EVALUATION (OUTPATIENT)
Dept: PHYSICAL THERAPY | Facility: CLINIC | Age: 71
End: 2021-10-18
Payer: MEDICARE

## 2021-10-18 DIAGNOSIS — Z98.890 STATUS POST HIP SURGERY: Primary | ICD-10-CM

## 2021-10-18 DIAGNOSIS — S72.001D ENCOUNTER FOR AFTERCARE FOR HEALING CLOSED TRAUMATIC FRACTURE OF RIGHT HIP: ICD-10-CM

## 2021-10-18 PROCEDURE — 97112 NEUROMUSCULAR REEDUCATION: CPT | Performed by: PHYSICAL THERAPIST

## 2021-10-18 PROCEDURE — 97140 MANUAL THERAPY 1/> REGIONS: CPT | Performed by: PHYSICAL THERAPIST

## 2021-10-18 PROCEDURE — 97110 THERAPEUTIC EXERCISES: CPT | Performed by: PHYSICAL THERAPIST

## 2021-10-19 ENCOUNTER — OFFICE VISIT (OUTPATIENT)
Dept: FAMILY MEDICINE CLINIC | Facility: HOSPITAL | Age: 71
End: 2021-10-19
Payer: MEDICARE

## 2021-10-19 VITALS
HEART RATE: 69 BPM | WEIGHT: 245 LBS | BODY MASS INDEX: 28.93 KG/M2 | HEIGHT: 77 IN | DIASTOLIC BLOOD PRESSURE: 74 MMHG | SYSTOLIC BLOOD PRESSURE: 122 MMHG | RESPIRATION RATE: 16 BRPM | OXYGEN SATURATION: 97 %

## 2021-10-19 DIAGNOSIS — I10 ESSENTIAL HYPERTENSION: ICD-10-CM

## 2021-10-19 DIAGNOSIS — Z23 NEED FOR INFLUENZA VACCINATION: ICD-10-CM

## 2021-10-19 DIAGNOSIS — G47.09 OTHER INSOMNIA: ICD-10-CM

## 2021-10-19 DIAGNOSIS — S72.001A CLOSED FRACTURE OF RIGHT HIP, INITIAL ENCOUNTER (HCC): Primary | ICD-10-CM

## 2021-10-19 PROCEDURE — G0008 ADMIN INFLUENZA VIRUS VAC: HCPCS

## 2021-10-19 PROCEDURE — 90662 IIV NO PRSV INCREASED AG IM: CPT

## 2021-10-19 PROCEDURE — 99213 OFFICE O/P EST LOW 20 MIN: CPT | Performed by: INTERNAL MEDICINE

## 2021-10-19 RX ORDER — OXYCODONE HYDROCHLORIDE 5 MG/1
5 TABLET ORAL DAILY PRN
Qty: 10 TABLET | Refills: 0 | Status: SHIPPED | OUTPATIENT
Start: 2021-10-19 | End: 2021-12-15 | Stop reason: ALTCHOICE

## 2021-10-19 RX ORDER — CHOLECALCIFEROL (VITAMIN D3) 25 MCG
CAPSULE ORAL
COMMUNITY

## 2021-10-21 ENCOUNTER — OFFICE VISIT (OUTPATIENT)
Dept: PHYSICAL THERAPY | Facility: CLINIC | Age: 71
End: 2021-10-21
Payer: MEDICARE

## 2021-10-21 DIAGNOSIS — S72.001D ENCOUNTER FOR AFTERCARE FOR HEALING CLOSED TRAUMATIC FRACTURE OF RIGHT HIP: ICD-10-CM

## 2021-10-21 DIAGNOSIS — Z98.890 STATUS POST HIP SURGERY: Primary | ICD-10-CM

## 2021-10-21 PROCEDURE — 97112 NEUROMUSCULAR REEDUCATION: CPT

## 2021-10-21 PROCEDURE — 97110 THERAPEUTIC EXERCISES: CPT

## 2021-10-25 ENCOUNTER — OFFICE VISIT (OUTPATIENT)
Dept: PHYSICAL THERAPY | Facility: CLINIC | Age: 71
End: 2021-10-25
Payer: MEDICARE

## 2021-10-25 DIAGNOSIS — S72.001D ENCOUNTER FOR AFTERCARE FOR HEALING CLOSED TRAUMATIC FRACTURE OF RIGHT HIP: ICD-10-CM

## 2021-10-25 DIAGNOSIS — Z98.890 STATUS POST HIP SURGERY: Primary | ICD-10-CM

## 2021-10-25 PROCEDURE — 97112 NEUROMUSCULAR REEDUCATION: CPT

## 2021-10-25 PROCEDURE — 97110 THERAPEUTIC EXERCISES: CPT

## 2021-10-28 ENCOUNTER — OFFICE VISIT (OUTPATIENT)
Dept: PHYSICAL THERAPY | Facility: CLINIC | Age: 71
End: 2021-10-28
Payer: MEDICARE

## 2021-10-28 DIAGNOSIS — Z98.890 STATUS POST HIP SURGERY: Primary | ICD-10-CM

## 2021-10-28 DIAGNOSIS — S72.001D ENCOUNTER FOR AFTERCARE FOR HEALING CLOSED TRAUMATIC FRACTURE OF RIGHT HIP: ICD-10-CM

## 2021-10-28 PROCEDURE — 97112 NEUROMUSCULAR REEDUCATION: CPT

## 2021-10-28 PROCEDURE — 97110 THERAPEUTIC EXERCISES: CPT

## 2021-10-29 ENCOUNTER — PATIENT OUTREACH (OUTPATIENT)
Dept: FAMILY MEDICINE CLINIC | Facility: HOSPITAL | Age: 71
End: 2021-10-29

## 2021-11-01 ENCOUNTER — OFFICE VISIT (OUTPATIENT)
Dept: PHYSICAL THERAPY | Facility: CLINIC | Age: 71
End: 2021-11-01
Payer: MEDICARE

## 2021-11-01 ENCOUNTER — LAB (OUTPATIENT)
Dept: LAB | Facility: HOSPITAL | Age: 71
End: 2021-11-01
Attending: INTERNAL MEDICINE
Payer: MEDICARE

## 2021-11-01 DIAGNOSIS — Z98.890 STATUS POST HIP SURGERY: Primary | ICD-10-CM

## 2021-11-01 DIAGNOSIS — I10 ESSENTIAL HYPERTENSION: ICD-10-CM

## 2021-11-01 DIAGNOSIS — S72.001D ENCOUNTER FOR AFTERCARE FOR HEALING CLOSED TRAUMATIC FRACTURE OF RIGHT HIP: ICD-10-CM

## 2021-11-01 LAB
ANION GAP SERPL CALCULATED.3IONS-SCNC: 8 MMOL/L (ref 4–13)
BUN SERPL-MCNC: 18 MG/DL (ref 5–25)
CALCIUM SERPL-MCNC: 9.6 MG/DL (ref 8.3–10.1)
CHLORIDE SERPL-SCNC: 105 MMOL/L (ref 100–108)
CO2 SERPL-SCNC: 21 MMOL/L (ref 21–32)
CREAT SERPL-MCNC: 1.38 MG/DL (ref 0.6–1.3)
GFR SERPL CREATININE-BSD FRML MDRD: 50 ML/MIN/1.73SQ M
GLUCOSE P FAST SERPL-MCNC: 96 MG/DL (ref 65–99)
POTASSIUM SERPL-SCNC: 4.1 MMOL/L (ref 3.5–5.3)
SODIUM SERPL-SCNC: 134 MMOL/L (ref 136–145)

## 2021-11-01 PROCEDURE — 80048 BASIC METABOLIC PNL TOTAL CA: CPT

## 2021-11-01 PROCEDURE — 36415 COLL VENOUS BLD VENIPUNCTURE: CPT

## 2021-11-01 PROCEDURE — 97110 THERAPEUTIC EXERCISES: CPT

## 2021-11-01 PROCEDURE — 97112 NEUROMUSCULAR REEDUCATION: CPT

## 2021-11-04 ENCOUNTER — EVALUATION (OUTPATIENT)
Dept: PHYSICAL THERAPY | Facility: CLINIC | Age: 71
End: 2021-11-04
Payer: MEDICARE

## 2021-11-04 DIAGNOSIS — S72.001D ENCOUNTER FOR AFTERCARE FOR HEALING CLOSED TRAUMATIC FRACTURE OF RIGHT HIP: ICD-10-CM

## 2021-11-04 DIAGNOSIS — Z98.890 STATUS POST HIP SURGERY: Primary | ICD-10-CM

## 2021-11-04 PROCEDURE — 97112 NEUROMUSCULAR REEDUCATION: CPT

## 2021-11-04 PROCEDURE — 97110 THERAPEUTIC EXERCISES: CPT

## 2021-11-08 ENCOUNTER — OFFICE VISIT (OUTPATIENT)
Dept: PHYSICAL THERAPY | Facility: CLINIC | Age: 71
End: 2021-11-08
Payer: MEDICARE

## 2021-11-08 DIAGNOSIS — S72.001D ENCOUNTER FOR AFTERCARE FOR HEALING CLOSED TRAUMATIC FRACTURE OF RIGHT HIP: ICD-10-CM

## 2021-11-08 DIAGNOSIS — Z98.890 STATUS POST HIP SURGERY: Primary | ICD-10-CM

## 2021-11-08 PROCEDURE — 97112 NEUROMUSCULAR REEDUCATION: CPT

## 2021-11-08 PROCEDURE — 97110 THERAPEUTIC EXERCISES: CPT

## 2021-11-11 ENCOUNTER — OFFICE VISIT (OUTPATIENT)
Dept: PHYSICAL THERAPY | Facility: CLINIC | Age: 71
End: 2021-11-11
Payer: MEDICARE

## 2021-11-11 DIAGNOSIS — Z98.890 STATUS POST HIP SURGERY: Primary | ICD-10-CM

## 2021-11-11 DIAGNOSIS — S72.001D ENCOUNTER FOR AFTERCARE FOR HEALING CLOSED TRAUMATIC FRACTURE OF RIGHT HIP: ICD-10-CM

## 2021-11-11 PROCEDURE — 97110 THERAPEUTIC EXERCISES: CPT

## 2021-11-11 PROCEDURE — 97112 NEUROMUSCULAR REEDUCATION: CPT

## 2021-11-11 PROCEDURE — 97530 THERAPEUTIC ACTIVITIES: CPT

## 2021-11-15 ENCOUNTER — OFFICE VISIT (OUTPATIENT)
Dept: PHYSICAL THERAPY | Facility: CLINIC | Age: 71
End: 2021-11-15
Payer: MEDICARE

## 2021-11-15 DIAGNOSIS — Z98.890 STATUS POST HIP SURGERY: Primary | ICD-10-CM

## 2021-11-15 DIAGNOSIS — S72.001D ENCOUNTER FOR AFTERCARE FOR HEALING CLOSED TRAUMATIC FRACTURE OF RIGHT HIP: ICD-10-CM

## 2021-11-15 PROCEDURE — 97110 THERAPEUTIC EXERCISES: CPT

## 2021-11-15 PROCEDURE — 97112 NEUROMUSCULAR REEDUCATION: CPT

## 2021-11-18 ENCOUNTER — OFFICE VISIT (OUTPATIENT)
Dept: PHYSICAL THERAPY | Facility: CLINIC | Age: 71
End: 2021-11-18
Payer: MEDICARE

## 2021-11-18 ENCOUNTER — TELEPHONE (OUTPATIENT)
Dept: FAMILY MEDICINE CLINIC | Facility: HOSPITAL | Age: 71
End: 2021-11-18

## 2021-11-18 DIAGNOSIS — S72.001D ENCOUNTER FOR AFTERCARE FOR HEALING CLOSED TRAUMATIC FRACTURE OF RIGHT HIP: ICD-10-CM

## 2021-11-18 DIAGNOSIS — Z98.890 STATUS POST HIP SURGERY: Primary | ICD-10-CM

## 2021-11-18 PROCEDURE — 97530 THERAPEUTIC ACTIVITIES: CPT | Performed by: PHYSICAL THERAPIST

## 2021-11-18 PROCEDURE — 97110 THERAPEUTIC EXERCISES: CPT | Performed by: PHYSICAL THERAPIST

## 2021-11-18 PROCEDURE — 97112 NEUROMUSCULAR REEDUCATION: CPT | Performed by: PHYSICAL THERAPIST

## 2021-11-22 ENCOUNTER — OFFICE VISIT (OUTPATIENT)
Dept: PHYSICAL THERAPY | Facility: CLINIC | Age: 71
End: 2021-11-22
Payer: MEDICARE

## 2021-11-22 DIAGNOSIS — Z98.890 STATUS POST HIP SURGERY: Primary | ICD-10-CM

## 2021-11-22 DIAGNOSIS — S72.001D ENCOUNTER FOR AFTERCARE FOR HEALING CLOSED TRAUMATIC FRACTURE OF RIGHT HIP: ICD-10-CM

## 2021-11-22 PROCEDURE — 97112 NEUROMUSCULAR REEDUCATION: CPT

## 2021-11-22 PROCEDURE — 97110 THERAPEUTIC EXERCISES: CPT

## 2021-11-29 ENCOUNTER — EVALUATION (OUTPATIENT)
Dept: PHYSICAL THERAPY | Facility: CLINIC | Age: 71
End: 2021-11-29
Payer: MEDICARE

## 2021-11-29 DIAGNOSIS — Z98.890 STATUS POST HIP SURGERY: Primary | ICD-10-CM

## 2021-11-29 DIAGNOSIS — S72.001D ENCOUNTER FOR AFTERCARE FOR HEALING CLOSED TRAUMATIC FRACTURE OF RIGHT HIP: ICD-10-CM

## 2021-11-29 DIAGNOSIS — E78.00 HYPERCHOLESTEREMIA: ICD-10-CM

## 2021-11-29 PROCEDURE — 97110 THERAPEUTIC EXERCISES: CPT | Performed by: PHYSICAL THERAPIST

## 2021-11-29 PROCEDURE — 97112 NEUROMUSCULAR REEDUCATION: CPT | Performed by: PHYSICAL THERAPIST

## 2021-11-29 RX ORDER — PRAVASTATIN SODIUM 20 MG
TABLET ORAL
Qty: 90 TABLET | Refills: 1 | Status: SHIPPED | OUTPATIENT
Start: 2021-11-29 | End: 2022-05-23

## 2021-11-30 ENCOUNTER — PATIENT OUTREACH (OUTPATIENT)
Dept: FAMILY MEDICINE CLINIC | Facility: HOSPITAL | Age: 71
End: 2021-11-30

## 2021-12-01 ENCOUNTER — OFFICE VISIT (OUTPATIENT)
Dept: OBGYN CLINIC | Facility: CLINIC | Age: 71
End: 2021-12-01
Payer: MEDICARE

## 2021-12-01 ENCOUNTER — APPOINTMENT (OUTPATIENT)
Dept: RADIOLOGY | Facility: CLINIC | Age: 71
End: 2021-12-01
Payer: MEDICARE

## 2021-12-01 ENCOUNTER — TELEPHONE (OUTPATIENT)
Dept: OBGYN CLINIC | Facility: HOSPITAL | Age: 71
End: 2021-12-01

## 2021-12-01 VITALS
HEIGHT: 77 IN | BODY MASS INDEX: 31.64 KG/M2 | SYSTOLIC BLOOD PRESSURE: 120 MMHG | WEIGHT: 268 LBS | DIASTOLIC BLOOD PRESSURE: 82 MMHG

## 2021-12-01 DIAGNOSIS — M43.17 SPONDYLOLISTHESIS AT L5-S1 LEVEL: ICD-10-CM

## 2021-12-01 DIAGNOSIS — M54.50 LUMBAR BACK PAIN: ICD-10-CM

## 2021-12-01 DIAGNOSIS — Z47.89 AFTERCARE FOLLOWING SURGERY OF THE MUSCULOSKELETAL SYSTEM: Primary | ICD-10-CM

## 2021-12-01 DIAGNOSIS — Z47.89 AFTERCARE FOLLOWING SURGERY OF THE MUSCULOSKELETAL SYSTEM: ICD-10-CM

## 2021-12-01 PROCEDURE — 99213 OFFICE O/P EST LOW 20 MIN: CPT | Performed by: ORTHOPAEDIC SURGERY

## 2021-12-01 PROCEDURE — 72110 X-RAY EXAM L-2 SPINE 4/>VWS: CPT

## 2021-12-01 PROCEDURE — 73502 X-RAY EXAM HIP UNI 2-3 VIEWS: CPT

## 2021-12-02 ENCOUNTER — OFFICE VISIT (OUTPATIENT)
Dept: PHYSICAL THERAPY | Facility: CLINIC | Age: 71
End: 2021-12-02
Payer: MEDICARE

## 2021-12-02 DIAGNOSIS — Z98.890 STATUS POST HIP SURGERY: Primary | ICD-10-CM

## 2021-12-02 DIAGNOSIS — S72.001D ENCOUNTER FOR AFTERCARE FOR HEALING CLOSED TRAUMATIC FRACTURE OF RIGHT HIP: ICD-10-CM

## 2021-12-02 PROCEDURE — 97112 NEUROMUSCULAR REEDUCATION: CPT

## 2021-12-02 PROCEDURE — 97110 THERAPEUTIC EXERCISES: CPT

## 2021-12-06 ENCOUNTER — APPOINTMENT (OUTPATIENT)
Dept: PHYSICAL THERAPY | Facility: CLINIC | Age: 71
End: 2021-12-06
Payer: MEDICARE

## 2021-12-09 ENCOUNTER — OFFICE VISIT (OUTPATIENT)
Dept: PHYSICAL THERAPY | Facility: CLINIC | Age: 71
End: 2021-12-09
Payer: MEDICARE

## 2021-12-09 DIAGNOSIS — S72.001D ENCOUNTER FOR AFTERCARE FOR HEALING CLOSED TRAUMATIC FRACTURE OF RIGHT HIP: ICD-10-CM

## 2021-12-09 DIAGNOSIS — Z98.890 STATUS POST HIP SURGERY: Primary | ICD-10-CM

## 2021-12-09 PROCEDURE — 97110 THERAPEUTIC EXERCISES: CPT

## 2021-12-09 PROCEDURE — 97112 NEUROMUSCULAR REEDUCATION: CPT

## 2021-12-13 ENCOUNTER — OFFICE VISIT (OUTPATIENT)
Dept: PHYSICAL THERAPY | Facility: CLINIC | Age: 71
End: 2021-12-13
Payer: MEDICARE

## 2021-12-13 DIAGNOSIS — Z98.890 STATUS POST HIP SURGERY: Primary | ICD-10-CM

## 2021-12-13 DIAGNOSIS — S72.001D ENCOUNTER FOR AFTERCARE FOR HEALING CLOSED TRAUMATIC FRACTURE OF RIGHT HIP: ICD-10-CM

## 2021-12-13 PROCEDURE — 97110 THERAPEUTIC EXERCISES: CPT

## 2021-12-13 PROCEDURE — 97112 NEUROMUSCULAR REEDUCATION: CPT

## 2021-12-15 ENCOUNTER — OFFICE VISIT (OUTPATIENT)
Dept: FAMILY MEDICINE CLINIC | Facility: HOSPITAL | Age: 71
End: 2021-12-15
Payer: MEDICARE

## 2021-12-15 VITALS
HEIGHT: 77 IN | BODY MASS INDEX: 31.53 KG/M2 | SYSTOLIC BLOOD PRESSURE: 122 MMHG | RESPIRATION RATE: 16 BRPM | WEIGHT: 267 LBS | HEART RATE: 72 BPM | OXYGEN SATURATION: 98 % | DIASTOLIC BLOOD PRESSURE: 86 MMHG

## 2021-12-15 DIAGNOSIS — I83.893 VARICOSE VEINS OF BOTH LEGS WITH EDEMA: ICD-10-CM

## 2021-12-15 DIAGNOSIS — M17.11 PRIMARY OSTEOARTHRITIS OF RIGHT KNEE: ICD-10-CM

## 2021-12-15 DIAGNOSIS — G62.9 NEUROPATHY: ICD-10-CM

## 2021-12-15 DIAGNOSIS — E53.8 B12 DEFICIENCY: ICD-10-CM

## 2021-12-15 DIAGNOSIS — G47.09 OTHER INSOMNIA: ICD-10-CM

## 2021-12-15 DIAGNOSIS — E53.8 FOLATE DEFICIENCY: ICD-10-CM

## 2021-12-15 DIAGNOSIS — I10 ESSENTIAL HYPERTENSION: Primary | ICD-10-CM

## 2021-12-15 DIAGNOSIS — I48.0 PAROXYSMAL ATRIAL FIBRILLATION (HCC): ICD-10-CM

## 2021-12-15 PROCEDURE — 99214 OFFICE O/P EST MOD 30 MIN: CPT | Performed by: INTERNAL MEDICINE

## 2021-12-15 RX ORDER — GABAPENTIN 300 MG/1
300 CAPSULE ORAL
Qty: 30 CAPSULE | Refills: 1 | Status: SHIPPED | OUTPATIENT
Start: 2021-12-15 | End: 2022-02-28 | Stop reason: SDUPTHER

## 2021-12-16 ENCOUNTER — OFFICE VISIT (OUTPATIENT)
Dept: PHYSICAL THERAPY | Facility: CLINIC | Age: 71
End: 2021-12-16
Payer: MEDICARE

## 2021-12-16 DIAGNOSIS — S72.001D ENCOUNTER FOR AFTERCARE FOR HEALING CLOSED TRAUMATIC FRACTURE OF RIGHT HIP: ICD-10-CM

## 2021-12-16 DIAGNOSIS — Z98.890 STATUS POST HIP SURGERY: Primary | ICD-10-CM

## 2021-12-16 PROCEDURE — 97112 NEUROMUSCULAR REEDUCATION: CPT

## 2021-12-16 PROCEDURE — 97110 THERAPEUTIC EXERCISES: CPT

## 2021-12-20 ENCOUNTER — OFFICE VISIT (OUTPATIENT)
Dept: PHYSICAL THERAPY | Facility: CLINIC | Age: 71
End: 2021-12-20
Payer: MEDICARE

## 2021-12-20 DIAGNOSIS — S72.001D ENCOUNTER FOR AFTERCARE FOR HEALING CLOSED TRAUMATIC FRACTURE OF RIGHT HIP: Primary | ICD-10-CM

## 2021-12-20 DIAGNOSIS — Z98.890 STATUS POST HIP SURGERY: ICD-10-CM

## 2021-12-20 PROCEDURE — 97530 THERAPEUTIC ACTIVITIES: CPT | Performed by: PHYSICAL THERAPIST

## 2021-12-20 PROCEDURE — 97110 THERAPEUTIC EXERCISES: CPT | Performed by: PHYSICAL THERAPIST

## 2021-12-20 PROCEDURE — 97112 NEUROMUSCULAR REEDUCATION: CPT | Performed by: PHYSICAL THERAPIST

## 2021-12-23 ENCOUNTER — OFFICE VISIT (OUTPATIENT)
Dept: PHYSICAL THERAPY | Facility: CLINIC | Age: 71
End: 2021-12-23
Payer: MEDICARE

## 2021-12-23 DIAGNOSIS — Z98.890 STATUS POST HIP SURGERY: ICD-10-CM

## 2021-12-23 DIAGNOSIS — S72.001D ENCOUNTER FOR AFTERCARE FOR HEALING CLOSED TRAUMATIC FRACTURE OF RIGHT HIP: Primary | ICD-10-CM

## 2021-12-23 PROCEDURE — 97112 NEUROMUSCULAR REEDUCATION: CPT | Performed by: PHYSICAL THERAPIST

## 2021-12-23 PROCEDURE — 97110 THERAPEUTIC EXERCISES: CPT | Performed by: PHYSICAL THERAPIST

## 2021-12-23 PROCEDURE — 97530 THERAPEUTIC ACTIVITIES: CPT | Performed by: PHYSICAL THERAPIST

## 2021-12-27 ENCOUNTER — OFFICE VISIT (OUTPATIENT)
Dept: PHYSICAL THERAPY | Facility: CLINIC | Age: 71
End: 2021-12-27
Payer: MEDICARE

## 2021-12-27 DIAGNOSIS — Z98.890 STATUS POST HIP SURGERY: ICD-10-CM

## 2021-12-27 DIAGNOSIS — S72.001D ENCOUNTER FOR AFTERCARE FOR HEALING CLOSED TRAUMATIC FRACTURE OF RIGHT HIP: Primary | ICD-10-CM

## 2021-12-27 PROCEDURE — 97110 THERAPEUTIC EXERCISES: CPT

## 2021-12-27 PROCEDURE — 97112 NEUROMUSCULAR REEDUCATION: CPT

## 2021-12-30 ENCOUNTER — EVALUATION (OUTPATIENT)
Dept: PHYSICAL THERAPY | Facility: CLINIC | Age: 71
End: 2021-12-30
Payer: MEDICARE

## 2021-12-30 ENCOUNTER — LAB (OUTPATIENT)
Dept: LAB | Facility: HOSPITAL | Age: 71
End: 2021-12-30
Attending: INTERNAL MEDICINE
Payer: MEDICARE

## 2021-12-30 DIAGNOSIS — I10 ESSENTIAL HYPERTENSION: ICD-10-CM

## 2021-12-30 DIAGNOSIS — Z98.890 STATUS POST HIP SURGERY: ICD-10-CM

## 2021-12-30 DIAGNOSIS — S72.001D ENCOUNTER FOR AFTERCARE FOR HEALING CLOSED TRAUMATIC FRACTURE OF RIGHT HIP: Primary | ICD-10-CM

## 2021-12-30 DIAGNOSIS — E53.8 FOLATE DEFICIENCY: ICD-10-CM

## 2021-12-30 DIAGNOSIS — E53.8 B12 DEFICIENCY: ICD-10-CM

## 2021-12-30 DIAGNOSIS — G62.9 NEUROPATHY: ICD-10-CM

## 2021-12-30 LAB
ALBUMIN SERPL BCP-MCNC: 3.8 G/DL (ref 3.5–5)
ALP SERPL-CCNC: 82 U/L (ref 46–116)
ALT SERPL W P-5'-P-CCNC: 17 U/L (ref 12–78)
ANION GAP SERPL CALCULATED.3IONS-SCNC: 8 MMOL/L (ref 4–13)
AST SERPL W P-5'-P-CCNC: 15 U/L (ref 5–45)
BILIRUB SERPL-MCNC: 0.61 MG/DL (ref 0.2–1)
BUN SERPL-MCNC: 23 MG/DL (ref 5–25)
CALCIUM SERPL-MCNC: 9.5 MG/DL (ref 8.3–10.1)
CHLORIDE SERPL-SCNC: 105 MMOL/L (ref 100–108)
CO2 SERPL-SCNC: 22 MMOL/L (ref 21–32)
CREAT SERPL-MCNC: 1.52 MG/DL (ref 0.6–1.3)
ERYTHROCYTE [DISTWIDTH] IN BLOOD BY AUTOMATED COUNT: 13.4 % (ref 11.6–15.1)
FOLATE SERPL-MCNC: >20 NG/ML (ref 3.1–17.5)
GFR SERPL CREATININE-BSD FRML MDRD: 44 ML/MIN/1.73SQ M
GLUCOSE P FAST SERPL-MCNC: 94 MG/DL (ref 65–99)
HCT VFR BLD AUTO: 41.1 % (ref 36.5–49.3)
HGB BLD-MCNC: 13.6 G/DL (ref 12–17)
MCH RBC QN AUTO: 29.6 PG (ref 26.8–34.3)
MCHC RBC AUTO-ENTMCNC: 33.1 G/DL (ref 31.4–37.4)
MCV RBC AUTO: 89 FL (ref 82–98)
PLATELET # BLD AUTO: 244 THOUSANDS/UL (ref 149–390)
PMV BLD AUTO: 11.2 FL (ref 8.9–12.7)
POTASSIUM SERPL-SCNC: 4.3 MMOL/L (ref 3.5–5.3)
PROT SERPL-MCNC: 8.4 G/DL (ref 6.4–8.2)
RBC # BLD AUTO: 4.6 MILLION/UL (ref 3.88–5.62)
SODIUM SERPL-SCNC: 135 MMOL/L (ref 136–145)
T4 FREE SERPL-MCNC: 1.03 NG/DL (ref 0.76–1.46)
TSH SERPL DL<=0.05 MIU/L-ACNC: 4.59 UIU/ML (ref 0.36–3.74)
VIT B12 SERPL-MCNC: 750 PG/ML (ref 100–900)
WBC # BLD AUTO: 6.19 THOUSAND/UL (ref 4.31–10.16)

## 2021-12-30 PROCEDURE — 36415 COLL VENOUS BLD VENIPUNCTURE: CPT

## 2021-12-30 PROCEDURE — 82607 VITAMIN B-12: CPT

## 2021-12-30 PROCEDURE — 84165 PROTEIN E-PHORESIS SERUM: CPT | Performed by: PATHOLOGY

## 2021-12-30 PROCEDURE — 84439 ASSAY OF FREE THYROXINE: CPT

## 2021-12-30 PROCEDURE — 84166 PROTEIN E-PHORESIS/URINE/CSF: CPT

## 2021-12-30 PROCEDURE — 97112 NEUROMUSCULAR REEDUCATION: CPT | Performed by: PHYSICAL THERAPIST

## 2021-12-30 PROCEDURE — 84443 ASSAY THYROID STIM HORMONE: CPT

## 2021-12-30 PROCEDURE — 80053 COMPREHEN METABOLIC PANEL: CPT

## 2021-12-30 PROCEDURE — 84165 PROTEIN E-PHORESIS SERUM: CPT

## 2021-12-30 PROCEDURE — 85027 COMPLETE CBC AUTOMATED: CPT

## 2021-12-30 PROCEDURE — 97110 THERAPEUTIC EXERCISES: CPT | Performed by: PHYSICAL THERAPIST

## 2021-12-30 PROCEDURE — 84166 PROTEIN E-PHORESIS/URINE/CSF: CPT | Performed by: PATHOLOGY

## 2021-12-30 PROCEDURE — 82746 ASSAY OF FOLIC ACID SERUM: CPT

## 2022-01-04 LAB
ALBUMIN SERPL ELPH-MCNC: 4.18 G/DL (ref 3.5–5)
ALBUMIN SERPL ELPH-MCNC: 52.2 % (ref 52–65)
ALBUMIN UR ELPH-MCNC: 100 %
ALPHA1 GLOB MFR UR ELPH: 0 %
ALPHA1 GLOB SERPL ELPH-MCNC: 0.3 G/DL (ref 0.1–0.4)
ALPHA1 GLOB SERPL ELPH-MCNC: 3.8 % (ref 2.5–5)
ALPHA2 GLOB MFR UR ELPH: 0 %
ALPHA2 GLOB SERPL ELPH-MCNC: 0.88 G/DL (ref 0.4–1.2)
ALPHA2 GLOB SERPL ELPH-MCNC: 11 % (ref 7–13)
B-GLOBULIN MFR UR ELPH: 0 %
BETA GLOB ABNORMAL SERPL ELPH-MCNC: 0.44 G/DL (ref 0.4–0.8)
BETA1 GLOB SERPL ELPH-MCNC: 5.5 % (ref 5–13)
BETA2 GLOB SERPL ELPH-MCNC: 5.7 % (ref 2–8)
BETA2+GAMMA GLOB SERPL ELPH-MCNC: 0.46 G/DL (ref 0.2–0.5)
GAMMA GLOB ABNORMAL SERPL ELPH-MCNC: 1.74 G/DL (ref 0.5–1.6)
GAMMA GLOB MFR UR ELPH: 0 %
GAMMA GLOB SERPL ELPH-MCNC: 21.8 % (ref 12–22)
IGG/ALB SER: 1.09 {RATIO} (ref 1.1–1.8)
PROT PATTERN SERPL ELPH-IMP: ABNORMAL
PROT PATTERN UR ELPH-IMP: NORMAL
PROT SERPL-MCNC: 8 G/DL (ref 6.4–8.2)
PROT UR-MCNC: 11 MG/DL

## 2022-02-08 DIAGNOSIS — I10 ESSENTIAL HYPERTENSION: ICD-10-CM

## 2022-02-08 RX ORDER — METOPROLOL SUCCINATE 50 MG/1
TABLET, EXTENDED RELEASE ORAL
Qty: 180 TABLET | Refills: 1 | Status: SHIPPED | OUTPATIENT
Start: 2022-02-08 | End: 2022-08-09

## 2022-02-15 ENCOUNTER — OFFICE VISIT (OUTPATIENT)
Dept: SLEEP CENTER | Facility: HOSPITAL | Age: 72
End: 2022-02-15
Attending: INTERNAL MEDICINE
Payer: MEDICARE

## 2022-02-15 VITALS
SYSTOLIC BLOOD PRESSURE: 146 MMHG | DIASTOLIC BLOOD PRESSURE: 70 MMHG | HEIGHT: 77 IN | BODY MASS INDEX: 31.76 KG/M2 | WEIGHT: 269 LBS

## 2022-02-15 DIAGNOSIS — I10 ESSENTIAL HYPERTENSION: ICD-10-CM

## 2022-02-15 DIAGNOSIS — G47.8 NOCTURNAL SLEEP-RELATED EATING DISORDER: ICD-10-CM

## 2022-02-15 DIAGNOSIS — I48.0 PAROXYSMAL ATRIAL FIBRILLATION (HCC): ICD-10-CM

## 2022-02-15 DIAGNOSIS — G47.09 OTHER INSOMNIA: ICD-10-CM

## 2022-02-15 DIAGNOSIS — E66.9 OBESITY (BMI 30-39.9): ICD-10-CM

## 2022-02-15 DIAGNOSIS — G47.33 OSA (OBSTRUCTIVE SLEEP APNEA): Primary | ICD-10-CM

## 2022-02-15 DIAGNOSIS — G47.19 EXCESSIVE DAYTIME SLEEPINESS: ICD-10-CM

## 2022-02-15 DIAGNOSIS — F10.10 ALCOHOL ABUSE: ICD-10-CM

## 2022-02-15 DIAGNOSIS — Z95.1 S/P CABG (CORONARY ARTERY BYPASS GRAFT): ICD-10-CM

## 2022-02-15 PROCEDURE — 99204 OFFICE O/P NEW MOD 45 MIN: CPT | Performed by: INTERNAL MEDICINE

## 2022-02-15 NOTE — PROGRESS NOTES
Consultation - [facility]  66 y o  male  :1950  VNF:[id number]  DOS:2/15/2022    Physician Requesting Consult: Jazlyn Owusu MD             Reason for Consult : [At your kind request] I saw Rahel Moody for initial sleep evaluation today  Patient is here for a complaint of Complaint of insomnia      PFSH, Problem List, Medications & Allergies were reviewed in EMR  Paul Thompson  has a past medical history of Alcohol abuse, Sparks esophagus, Coronary artery disease, Fracture, and Hypertension  He has a current medication list which includes the following prescription(s): aspirin, baclofen, vitamin d-3, folic acid, furosemide, gabapentin, metoprolol succinate, pantoprazole, polyethylene glycol 3350, potassium chloride, pravastatin, sildenafil, thiamine, vitamin b-12, and [DISCONTINUED] cyclobenzaprine  HPI:  He he presents with complaints of difficulty both initiating and maintaining sleep of several years duration  According to his wife, since he is on zolpidem 10 mg, he appears to fall asleep but awakens within an hour and a half, is unsure of the cause and struggles to fall back asleep  Wife also reports heavy breathing" followed by "breathing pauses and the cycle starts over again " He is not aware of breathing difficulties during sleep  He snacks at night and feels that it would help him to fall back asleep  Other Complaints:  Daytime drowsiness  Restless Leg Syndrome: reports no suggestive symptoms  Parasomnia: no features reported    Sleep Routine (on average): [ ] Typical Bedtime:  11:30 p m  Gets OOB:  7:00 a m  TIB:7 5 hrs  Sleep latency:<  30 minutes with use of zolpidem " and at times I do not sleep at all  (his report  Is in conflict with wife's observation)  Sleep Interruptions:1-/nite []  Awakens: spontaneously  Upon awakening: never feels rested and within a few hours goes back to sleep for another 3 hours    Paul Thompson reports [Excessive] [Daytime] Sleepiness [dozes off when sedentary at home][ ] but rated [himself] at Total score: 4 /24 on the Foster Sleepiness Scale  Habits:  reports that he has never smoked  He has never used smokeless tobacco [ ]; [  E-Cigarette/Vaping    E-Cigarette Use Never User    ][ ]; [ reports no history of drug use ];  reports current alcohol use of about 3 0 standard drinks of alcohol per week  He was using alcohol as a sleep aid but states he has stopped using it as a medication; Caffeine use:none[ ]; Exercise routine: none[ ]  Family History: [Negative for sleep disturbance ]  ROS - reviewed and as attached  [Significant for approximately 50 lb weight gain in the past year since he had a hip fracture ]  He reported no nasal symptoms  He has dyspnea on effort and swelling of ankles but no other respiratory or cardiac symptoms  He is on gabapentin for lower extremity pain  EXAM:  /70 (BP Location: Left arm, Patient Position: Sitting, Cuff Size: Large)   Ht 6' 5" (1 956 m)   Wt 122 kg (269 lb)   BMI 31 90 kg/m²    General: [Well] groomed male, well appearing, in [no apparent] distress  Neurological: Alert and orientated; [ ]cooperative; Cranial nerves intact;    Psychiatric: Speech:[clear and coherent]; appears depressed and has a constricted affect   Skin: [warm and dry]; Color& Hydration [good]; [no] facial rashes or lesions   HEENT:  Craniofacial anatomy: normal Sinuses: [non-] tender  TMJ: [Normal]    Eyes: EOM's [intact];[ ] conjunctiva/corneas clear   Ears: Externally[appear normal]     Nasal Airway: is patent Septum:[intact]; Mucosa: [normal]; Turbinates: [normal]; Rhinorrhea: [None]   Mouth: Lips: [normal posture]; Dentition: normal   Mucosa:[moist] [ ]; Hard Palate:normal    Oropharryx: crowded and AP narrowing Tongue: Mallampati:Class IV and MobileSoft Palate:  redundant  Tonsils: absent  Neck:[is thick;] [Neck Circumference: 17 5 ";] Supple; [no] abnormal masses; Thyroid:[normal]  Trachea:[central]  Lymph: [No] Cervical [or] Submandibular Lymhadenopathy  Heart: S1,S2 normal; [RRR]; [no] gallop; [no] murmur[s]   Lungs: Respiratory Effort:[normal]  Air entry [good] [bilaterally]  [No] wheezes  [No] rales  Abdomen: [Obese,] Soft & non-tender    Extremities: [No] pedal edema  [No] clubbing or cyanosis  Musculoskeletal:  Motor [normal]; Gait:  Ambulant with a cane  IMPRESSION: Primary/Secondary Sleep Diagnoses (to Medical or Psych conditions) & Comorbidities   1  DONNA (obstructive sleep apnea)  Diagnostic Sleep Study    CPAP Study   2  Other insomnia  Ambulatory referral to Sleep Medicine   3  Nocturnal sleep-related eating disorder     4  Excessive daytime sleepiness     5  Alcohol abuse     6  Paroxysmal atrial fibrillation (HCC)     7  Essential hypertension     8  S/P CABG (coronary artery bypass graft)     9  Obesity (BMI 30-39  9)          PLAN:   With respect to above conditions, comprehensive counseling provided on pathophysiology; effects on symptoms and comorbidities, diagnostic strategies & limitations; treatment options; risks or no treament; risks & benefits of the various therapeutic options; costs and insurance aspects  I advised [weight reduction,] avoiding sleeping supine, using alcohol or sedating medications close to bed time [and on safe driving practices]  Nocturnal polysomnography is indicated and [a diagnostic study] will be scheduled  [Patient is willing to try Positive airway pressure therapy and will be scheduled for a titration study if indicated ]   Multi component Cognitive behavioral therapy for Insomnia undertaken - Sleep Restriction, Stimulus control, Relaxation techniques and Sleep hygiene were discussed  Follow-up to be scheduled [after the studies] to review results, further details of treatment options and to [initiate/]adjust therapy      Sincerely,     Authenticated electronically by Enriqueta Diamond MD   on 76/26/93   Board Certified Specialist     Portions of the record may have been created with voice recognition software  Occasional wrong word or "sound a like" substitutions may have occurred due to the inherent limitations of voice recognition software  There may also be notations and random deletions of words or characters from malfunctioning software  Read the chart carefully and recognize, using context, where substitutions/deletions have occurred

## 2022-02-15 NOTE — PATIENT INSTRUCTIONS
What is DONNA? Obstructive sleep apnea is a common and serious sleep disorder that causes you to stop breathing during sleep  The airway repeatedly becomes blocked, limiting the amount of air that reaches your lungs  When this happens, you may snore loudly or making choking noises as you try to breathe  Your brain and body becomes oxygen deprived and you may wake up  This may happen a few times a night, or in more severe cases, several hundred times a night  Sleep apnea can make you wake up in the morning feeling tired or unrefreshed even though you have had a full night of sleep  During the day, you may feel fatigued, have difficulty concentrating or you may even unintentionally fall asleep  This is because your body is waking up numerous times throughout the night, even though you might not be conscious of each awakening  The lack of oxygen your body receives can have negative long-term consequences for your health  This includes:  High blood pressure  Heart disease  Irregular heart rhythms  Stroke  Pre-diabetes and diabetes  Depression    Testing  An objective evaluation of your sleep may be needed before your board certified sleep physician can make a diagnosis  Options include:   In-lab overnight sleep study  This type of sleep study requires you to stay overnight at a sleep center, in a bed that may resemble a hotel room  You will sleep with sensors hooked up to various parts of your body  These sensors record your brain waves, heartbeat, breathing and movement  An overnight sleep study also provides your doctor with the most complete information about your sleep  Learn more about an overnight sleep study      Home sleep apnea test  Some patients with high risk factors for obstructive sleep apnea and no other medical disorders may be candidates for a home sleep apnea test  The testing equipment differs in that it is less complicated than what is used in an overnight sleep study   As such, does not give all the data an in-lab will and if negative, may not mean you do not have the problem  Treatment for sleep apnea  includes using a continuous positive airway pressure (CPAP) machine to keep your airway open during sleep  A mask is placed over your nose and mouth, or just your nose  The mask is hooked to the CPAP machine that blows a gentle stream of air into the mask when you breathe  This helps keep your airway open so you can breathe more regularly  Extra oxygen may be given to you through the machine  You may be given a mouth device  It looks like a mouth guard or dental retainer and stops your tongue and mouth tissues from blocking your throat while you sleep  Surgery may be needed to remove extra tissues that block your mouth, throat, or nose  Manage sleep apnea:   Do not smoke  Nicotine and other chemicals in cigarettes and cigars can cause lung damage  Ask your healthcare provider for information if you currently smoke and need help to quit  E-cigarettes or smokeless tobacco still contain nicotine  Talk to your healthcare provider before you use these products  Do not drink alcohol or take sedative medicine before you go to sleep  Alcohol and sedatives can relax the muscles and tissues around your throat  This can block the airflow to your lungs  Maintain a healthy weight  Excess tissue around your throat may restrict your breathing  Ask your healthcare provider for information if you need to lose weight  Sleep on your side or use pillows designed to prevent sleep apnea  This prevents your tongue or other tissues from blocking your throat  You can also raise the head of your bed  Driving Safety  Refrain from driving when drowsy  Follow up with your healthcare provider as directed:  Write down your questions so you remember to ask them during your visits  Go to AASM website for more information: Sleepeducation  org     What is DONNA?    Obstructive sleep apnea is a common and serious sleep disorder that causes you to stop breathing during sleep  The airway repeatedly becomes blocked, limiting the amount of air that reaches your lungs  When this happens, you may snore loudly or making choking noises as you try to breathe  Your brain and body becomes oxygen deprived and you may wake up  This may happen a few times a night, or in more severe cases, several hundred times a night  Sleep apnea can make you wake up in the morning feeling tired or unrefreshed even though you have had a full night of sleep  During the day, you may feel fatigued, have difficulty concentrating or you may even unintentionally fall asleep  This is because your body is waking up numerous times throughout the night, even though you might not be conscious of each awakening  The lack of oxygen your body receives can have negative long-term consequences for your health  This includes:  High blood pressure  Heart disease  Irregular heart rhythms  Stroke  Pre-diabetes and diabetes  Depression    Testing  An objective evaluation of your sleep may be needed before your board certified sleep physician can make a diagnosis  Options include:   In-lab overnight sleep study  This type of sleep study requires you to stay overnight at a sleep center, in a bed that may resemble a hotel room  You will sleep with sensors hooked up to various parts of your body  These sensors record your brain waves, heartbeat, breathing and movement  An overnight sleep study also provides your doctor with the most complete information about your sleep  Learn more about an overnight sleep study      Home sleep apnea test  Some patients with high risk factors for obstructive sleep apnea and no other medical disorders may be candidates for a home sleep apnea test  The testing equipment differs in that it is less complicated than what is used in an overnight sleep study   As such, does not give all the data an in-lab will and if negative, may not mean you do not have the problem  Treatment for sleep apnea  includes using a continuous positive airway pressure (CPAP) machine to keep your airway open during sleep  A mask is placed over your nose and mouth, or just your nose  The mask is hooked to the CPAP machine that blows a gentle stream of air into the mask when you breathe  This helps keep your airway open so you can breathe more regularly  Extra oxygen may be given to you through the machine  You may be given a mouth device  It looks like a mouth guard or dental retainer and stops your tongue and mouth tissues from blocking your throat while you sleep  Surgery may be needed to remove extra tissues that block your mouth, throat, or nose  Manage sleep apnea:   Do not smoke  Nicotine and other chemicals in cigarettes and cigars can cause lung damage  Ask your healthcare provider for information if you currently smoke and need help to quit  E-cigarettes or smokeless tobacco still contain nicotine  Talk to your healthcare provider before you use these products  Do not drink alcohol or take sedative medicine before you go to sleep  Alcohol and sedatives can relax the muscles and tissues around your throat  This can block the airflow to your lungs  Maintain a healthy weight  Excess tissue around your throat may restrict your breathing  Ask your healthcare provider for information if you need to lose weight  Sleep on your side or use pillows designed to prevent sleep apnea  This prevents your tongue or other tissues from blocking your throat  You can also raise the head of your bed  Driving Safety  Refrain from driving when drowsy  Follow up with your healthcare provider as directed:  Write down your questions so you remember to ask them during your visits  Go to AASM website for more information: Sleepeducation  org           What you can do to improve your sleep: (Sleep Hygiene) Basic rules for a good night's sleep    Create a regular sleep schedule    This will help you form a sleep routine  Keep a record of your sleep patterns, and any sleeping problems you have  Bring the record to follow-up visits with healthcare providers  Avoid prolonged use of light-emitting screens before bedtime or watching TV in bed  Avoid forcing sleep  Do not take naps  Naps could make it hard for you to fall asleep at bedtime  Deal with your worries before bedtime  Keep your bedroom cool, quiet, and dark  Turn on white noise, such as a fan, to help you relax  Do not use your bed for any activity that will keep you awake  Do not read, exercise, eat, or watch TV in your bedroom  Get up if you do not fall asleep within 20 minutes  Move to another room and do something relaxing until you become sleepy  Limit caffeine, alcohol, nicotine and food to earlier in the day  Only drink caffeine in the morning  Do not drink alcohol within 6 hours of bedtime  Do not eat a heavy meal right before you go to bed  Avoid smoking, especially in the evening  Exercise regularly  Daily exercise will help you sleep better  Do not exercise within 4 hours of bedtime  Stimulus control therapy rules  1  Go to bed only when sleepy  2  Do not watch television, read, eat, or worry while in bed  Use bed only for sleep and sex  3  Get out of bed if unable to fall asleep within 20 minutes and go to another room  Return to bed only when sleepy  Repeat this step as many times as necessary throughout the night  4  Set an alarm clock to wake up at a fixed time each morning, including weekends  5  Do not take a nap during the day  Data from: [address] [place] [address], [address] Nonpharmacologic treatments of insomnia  J Clin Psychiatry 6881; 53:37  Go to AASM website for more information: Sleepeducation  org     Recommended Reading:  Book by authors [address]   No More sleepless nights        Nursing Support:  When: Monday through Friday 7A-5PM except holidays  Where: Our direct line is [phone]  If you are having a true emergency please call 911  In the event that the line is busy or it is after hours please leave a voice message and we will return your call  Please speak clearly, leaving your full name, birth date, best number to reach you and the reason for your call  Medication refills: We will need the name of the medication, the dosage, the ordering provider, whether you get a 30 or 90 day refill, and the pharmacy name and address  Medications will be ordered by the provider only  Nurses cannot call in prescriptions  Please allow 7 days for medication refills  Physician requested updates: If your provider requested that you call with an update after starting medication, please be ready to provide us the medication and dosage, what time you take your medication, the time you attempt to fall asleep, time you fall asleep, when you wake up, and what time you get out of bed  Sleep Study Results: We will contact you with sleep study results and/or next steps after the physician has reviewed your testing

## 2022-02-16 ENCOUNTER — HOSPITAL ENCOUNTER (OUTPATIENT)
Dept: SLEEP CENTER | Facility: HOSPITAL | Age: 72
Discharge: HOME | End: 2022-02-16
Attending: INTERNAL MEDICINE
Payer: MEDICARE

## 2022-02-16 DIAGNOSIS — G47.33 OSA (OBSTRUCTIVE SLEEP APNEA): ICD-10-CM

## 2022-02-16 PROCEDURE — 95810 POLYSOM 6/> YRS 4/> PARAM: CPT

## 2022-02-17 NOTE — PROGRESS NOTES
Sleep Study Documentation    Pre-Sleep Study       Sleep testing procedure explained to patient:YES    Patient napped prior to study:NO    Caffeine:Dayshift worker after 12PM   Caffeine use:NO    Alcohol:Dayshift workers after 5PM: Alcohol use:NO    Typical day for patient:YES       Study Documentation    Sleep Study Indications: Snoring, BMI > 30, EDS, unrefreshing sleep, and witnessed apneas    Sleep Study: Diagnostic   Snore:None  Supplemental O2: no    O2 flow rate (L/min) range N/A  O2 flow rate (L/min) final N/A  Minimum SaO2 81%  Baseline SaO2 99%        EEG abnormalities: no    Sleep Study Recorded < 2 hours: N/A    Sleep Study Recorded > 2 hours but incomplete study: N/A    Sleep Study Recorded 6 hours but no sleep obtained: NO    Patient classification: retired       Post-Sleep Study    Medication used at bedtime or during sleep study:YES prescription sleep aid    Patient reports time it took to fall asleep:greater than 60 minutes    Patient reports waking up during study:Denied    Patient reports sleeping less than 2 hours without dreaming  Patient reports sleep during study:typical    Patient rated sleepiness: Somewhat sleepy or tired    PAP treatment:no

## 2022-02-23 ENCOUNTER — TELEPHONE (OUTPATIENT)
Dept: SLEEP CENTER | Facility: CLINIC | Age: 72
End: 2022-02-23

## 2022-02-23 NOTE — TELEPHONE ENCOUNTER
Called patient and advised sleep study resulted and shows moderate to severe DONNA  CPAP study recommended and is scheduled 5/4/22  Provided date and time of CPAP study    Patient is already on wait list

## 2022-02-28 DIAGNOSIS — G62.9 NEUROPATHY: ICD-10-CM

## 2022-02-28 RX ORDER — GABAPENTIN 300 MG/1
300 CAPSULE ORAL
Qty: 30 CAPSULE | Refills: 1 | Status: SHIPPED | OUTPATIENT
Start: 2022-02-28 | End: 2022-03-16 | Stop reason: ALTCHOICE

## 2022-03-07 ENCOUNTER — EVALUATION (OUTPATIENT)
Dept: PHYSICAL THERAPY | Facility: CLINIC | Age: 72
End: 2022-03-07
Payer: MEDICARE

## 2022-03-07 DIAGNOSIS — I89.0 LYMPHEDEMA OF BOTH LOWER EXTREMITIES: Primary | ICD-10-CM

## 2022-03-07 PROCEDURE — 97016 VASOPNEUMATIC DEVICE THERAPY: CPT | Performed by: PHYSICAL THERAPIST

## 2022-03-07 PROCEDURE — 97162 PT EVAL MOD COMPLEX 30 MIN: CPT | Performed by: PHYSICAL THERAPIST

## 2022-03-07 NOTE — LETTER
2022    [facility]  [address]  [place] [address]    Patient: Adrienne Arevalo   YOB: 1950   Date of Visit: 3/7/2022     Encounter Diagnosis     ICD-10-CM    1  Lymphedema of both lower extremities  I89 0        Dear Dr Maria R Child:    Thank you for your recent referral of Adrienne Arevalo  Please review the attached evaluation summary from Obinna's recent visit  Please verify that you agree with the plan of care by signing the attached order  If you have any questions or concerns, please do not hesitate to call  I sincerely appreciate the opportunity to share in the care of one of your patients and hope to have another opportunity to work with you in the near future  Sincerely,    Manny Cruz, PT      Referring Provider:      I certify that I have read the below Plan of Care and certify the need for these services furnished under this plan of treatment while under my care  [facility] [address]  [facility] [address]  Via Fax: [phone]          PT Evaluation     Today's date: 3/7/2022  Patient name: Adrienne Arevalo  : 1950  MRN: [id number]  Referring provider: No ref  provider found  Dx:   Encounter Diagnosis     ICD-10-CM    1  Lymphedema of both lower extremities  I89 0                   Assessment  Assessment details: Pt is a 66y o  year old male coming to outpatient PT with a diagnosis of B LE lymphedema onset about 6 months ago 2* unknown etiology  Pt presents with increased B pitting edema, no wounds, (+) skin color changes, and overall decreased functional mobility  Pt would benefit from skilled PT services in order to address these deficits and reach maximum level of function  Thank you kindly for the referral!    Pt was educated in lymphedema diagnosis and management  Trial of pneumatic compression pump performed   Pt had decreased swelling and improved skin color after 15 minute of pneumatic compression pump  Pt would benefit from knee high compression stockings and a home pneumatic compession pump to manage lymphedema  Impairments: abnormal gait, activity intolerance, lacks appropriate home exercise program and pain with function  Other impairment: increased B LE edema  Understanding of Dx/Px/POC: good   Prognosis: good    Goals  STG's ( 3-4 weeks)  1  Pt will be independent in HEP  2  Pt or spouse will be independent in LE compression wrapping  LTG's ( 6- 8 weeks)  1  Improve FOTO score by 4-6 points  2  Reduce B LE edema to max ability  3  Pt will be able to brenda and doff B Le knee high compression stockings    Plan  Patient would benefit from: lymphedema eval and skilled physical therapy  Other planned modality interventions: pneumatic compression pump  Planned therapy interventions: home exercise program, functional ROM exercises, flexibility, stretching, strengthening, therapeutic activities and therapeutic exercise  Other planned therapy interventions: MLD/ CLT  Frequency: 2x week  Duration in weeks: 6  Plan of Care beginning date: 3/7/2022  Plan of Care expiration date: 4/18/2022  Treatment plan discussed with: patient        Subjective Evaluation    History of Present Illness  Mechanism of injury: Pt reports increased swelling in B LE's over the past 6 months, suddenly 2* unknown etiology  Pt was seen by his cardiologist  Pt denies venous insufficiency, cancer or radiation therapy  Pt had open heart surgery 7-8 years ago  Pt fractured his R hip 9/2021  Pt has increased difficulty walking and transferring sit to stand from the hip fracture  Pt notices the swelling in his legs and he is bothered by it  Pt can feel a slight tightness or "bloatedness" in his legs, but denies pain  Pt has increased difficulty putting on his shoes 2* his weight and decreased flexibility      Work: retired  Hobbies: none  Gait: slow speed with SPC, mild antalgic  Pain  No pain reported    Social Support  Steps to enter house: yes  2  Stairs in house: yes   13  Lives in: multiple-level home    Employment status: not working  Treatments  No previous or current treatments  Patient Goals  Patient goals for therapy: decreased edema  Patient goal: to have less swelling in my ankles; to be able to travel to [id number] [facility] in the Fall        Objective     Neurological Testing     Sensation     Hip   Left Hip   Intact: light touch    Right Hip   Intact: light touch    Reflexes   Left   Patellar (L4): normal (2+)  Achilles (S1): normal (2+)    Right   Patellar (L4): normal (2+)  Achilles (S1): normal (2+)    Strength/Myotome Testing     Left Hip   Planes of Motion   Flexion: 4  External rotation: 4+  Internal rotation: 4+    Right Hip   Planes of Motion   Flexion: 4  External rotation: 4+  Internal rotation: 4+    Left Knee   Flexion: 5  Extension: 5    Right Knee   Flexion: 5  Extension: 5    Additional Strength Details  (+) B LE varicose veins  Toe/ foot color is dark red to purple in toes  (+) pitting edema B LE's with columnar appearance            Dx:  B LE lymphedema  EPOC: 4/18/22  CO-MORBIDITIES: R hip fx  PERSONAL FACTORS:  Precautions: none      Manuals 3/7            B LE MLD/ CLT                          Pneumatic compression pump B LE 35mmHG 15'                         Neuro Re-Ed                                                                                                        Ther Ex             lymphedema dx and tx 8'                                                                                                       Ther Activity                                       Gait Training                                       Modalities

## 2022-03-07 NOTE — PROGRESS NOTES
PT Evaluation     Today's date: 3/7/2022  Patient name: Elissa Coffman  : 1950  MRN: [id number]  Referring provider: No ref  provider found  Dx:   Encounter Diagnosis     ICD-10-CM    1  Lymphedema of both lower extremities  I89 0                   Assessment  Assessment details: Pt is a 66y o  year old male coming to outpatient PT with a diagnosis of B LE lymphedema onset about 6 months ago 2* unknown etiology  Pt presents with increased B pitting edema, no wounds, (+) skin color changes, and overall decreased functional mobility  Pt would benefit from skilled PT services in order to address these deficits and reach maximum level of function  Thank you kindly for the referral!    Pt was educated in lymphedema diagnosis and management  Trial of pneumatic compression pump performed  Pt had decreased swelling and improved skin color after 15 minute of pneumatic compression pump  Pt would benefit from knee high compression stockings and a home pneumatic compession pump to manage lymphedema  Impairments: abnormal gait, activity intolerance, lacks appropriate home exercise program and pain with function  Other impairment: increased B LE edema  Understanding of Dx/Px/POC: good   Prognosis: good    Goals  STG's ( 3-4 weeks)  1  Pt will be independent in HEP  2  Pt or spouse will be independent in LE compression wrapping  LTG's ( 6- 8 weeks)  1  Improve FOTO score by 4-6 points  2  Reduce B LE edema to max ability  3   Pt will be able to brenda and doff B Le knee high compression stockings    Plan  Patient would benefit from: lymphedema eval and skilled physical therapy  Other planned modality interventions: pneumatic compression pump  Planned therapy interventions: home exercise program, functional ROM exercises, flexibility, stretching, strengthening, therapeutic activities and therapeutic exercise  Other planned therapy interventions: MLD/ CLT  Frequency: 2x week  Duration in weeks: 6  Plan of Care beginning date: 3/7/2022  Plan of Care expiration date: 4/18/2022  Treatment plan discussed with: patient        Subjective Evaluation    History of Present Illness  Mechanism of injury: Pt reports increased swelling in B LE's over the past 6 months, suddenly 2* unknown etiology  Pt was seen by his cardiologist  Pt denies venous insufficiency, cancer or radiation therapy  Pt had open heart surgery 7-8 years ago  Pt fractured his R hip 9/2021  Pt has increased difficulty walking and transferring sit to stand from the hip fracture  Pt notices the swelling in his legs and he is bothered by it  Pt can feel a slight tightness or "bloatedness" in his legs, but denies pain  Pt has increased difficulty putting on his shoes 2* his weight and decreased flexibility  Work: retired  Hobbies: none  Gait: slow speed with SPC, mild antalgic  Pain  No pain reported    Social Support  Steps to enter house: yes  2  Stairs in house: yes   13  Lives in: multiple-level home    Employment status: not working  Treatments  No previous or current treatments  Patient Goals  Patient goals for therapy: decreased edema  Patient goal: to have less swelling in my ankles; to be able to travel to [address] [facility] in the Fall        Objective     Neurological Testing     Sensation     Hip   Left Hip   Intact: light touch    Right Hip   Intact: light touch    Reflexes   Left   Patellar (L4): normal (2+)  Achilles (S1): normal (2+)    Right   Patellar (L4): normal (2+)  Achilles (S1): normal (2+)    Strength/Myotome Testing     Left Hip   Planes of Motion   Flexion: 4  External rotation: 4+  Internal rotation: 4+    Right Hip   Planes of Motion   Flexion: 4  External rotation: 4+  Internal rotation: 4+    Left Knee   Flexion: 5  Extension: 5    Right Knee   Flexion: 5  Extension: 5    Additional Strength Details  (+) B LE varicose veins  Toe/ foot color is dark red to purple in toes  (+) pitting edema B LE's with columnar appearance            Dx:  B LE lymphedema  EPOC: 4/18/22  CO-MORBIDITIES: R hip fx  PERSONAL FACTORS:  Precautions: none      Manuals 3/7            B LE MLD/ CLT                          Pneumatic compression pump B LE 35mmHG 15'                         Neuro Re-Ed                                                                                                        Ther Ex             lymphedema dx and tx 8'                                                                                                       Ther Activity                                       Gait Training                                       Modalities

## 2022-03-08 ENCOUNTER — RA CDI HCC (OUTPATIENT)
Dept: OTHER | Facility: HOSPITAL | Age: 72
End: 2022-03-08

## 2022-03-08 NOTE — PROGRESS NOTES
N18 30  [facility]  coding opportunities             Chart Reviewed * (Number of) Inbasket suggestions sent to Provider: 1                  Patients insurance company: [employer]

## 2022-03-16 ENCOUNTER — APPOINTMENT (OUTPATIENT)
Dept: LAB | Facility: HOSPITAL | Age: 72
End: 2022-03-16
Attending: INTERNAL MEDICINE
Payer: MEDICARE

## 2022-03-16 ENCOUNTER — OFFICE VISIT (OUTPATIENT)
Dept: FAMILY MEDICINE CLINIC | Facility: HOSPITAL | Age: 72
End: 2022-03-16
Payer: MEDICARE

## 2022-03-16 VITALS
HEIGHT: 77 IN | RESPIRATION RATE: 16 BRPM | HEART RATE: 76 BPM | WEIGHT: 288 LBS | OXYGEN SATURATION: 96 % | BODY MASS INDEX: 34 KG/M2 | SYSTOLIC BLOOD PRESSURE: 130 MMHG | DIASTOLIC BLOOD PRESSURE: 70 MMHG

## 2022-03-16 DIAGNOSIS — I83.893 VARICOSE VEINS OF BOTH LEGS WITH EDEMA: ICD-10-CM

## 2022-03-16 DIAGNOSIS — I25.10 CORONARY ARTERY DISEASE INVOLVING NATIVE CORONARY ARTERY OF NATIVE HEART WITHOUT ANGINA PECTORIS: ICD-10-CM

## 2022-03-16 DIAGNOSIS — I48.0 PAROXYSMAL ATRIAL FIBRILLATION (HCC): Primary | ICD-10-CM

## 2022-03-16 DIAGNOSIS — I10 ESSENTIAL HYPERTENSION: ICD-10-CM

## 2022-03-16 DIAGNOSIS — J90 PLEURAL EFFUSION ON LEFT: ICD-10-CM

## 2022-03-16 DIAGNOSIS — S72.001D CLOSED FRACTURE OF RIGHT HIP WITH ROUTINE HEALING, SUBSEQUENT ENCOUNTER: ICD-10-CM

## 2022-03-16 DIAGNOSIS — E87.1 HYPONATREMIA: Primary | ICD-10-CM

## 2022-03-16 DIAGNOSIS — Z95.1 S/P CABG (CORONARY ARTERY BYPASS GRAFT): ICD-10-CM

## 2022-03-16 PROBLEM — Z47.89 AFTERCARE FOLLOWING SURGERY OF THE MUSCULOSKELETAL SYSTEM: Status: RESOLVED | Noted: 2021-10-13 | Resolved: 2022-03-16

## 2022-03-16 LAB
ALBUMIN SERPL BCP-MCNC: 3.7 G/DL (ref 3.5–5)
ALP SERPL-CCNC: 85 U/L (ref 46–116)
ALT SERPL W P-5'-P-CCNC: 21 U/L (ref 12–78)
ANION GAP SERPL CALCULATED.3IONS-SCNC: 9 MMOL/L (ref 4–13)
AST SERPL W P-5'-P-CCNC: 23 U/L (ref 5–45)
BILIRUB SERPL-MCNC: 0.66 MG/DL (ref 0.2–1)
BUN SERPL-MCNC: 15 MG/DL (ref 5–25)
CALCIUM SERPL-MCNC: 9.6 MG/DL (ref 8.3–10.1)
CHLORIDE SERPL-SCNC: 98 MMOL/L (ref 100–108)
CO2 SERPL-SCNC: 20 MMOL/L (ref 21–32)
CREAT SERPL-MCNC: 1.44 MG/DL (ref 0.6–1.3)
ERYTHROCYTE [DISTWIDTH] IN BLOOD BY AUTOMATED COUNT: 12.8 % (ref 11.6–15.1)
GFR SERPL CREATININE-BSD FRML MDRD: 47 ML/MIN/1.73SQ M
GLUCOSE P FAST SERPL-MCNC: 100 MG/DL (ref 65–99)
HCT VFR BLD AUTO: 41.1 % (ref 36.5–49.3)
HGB BLD-MCNC: 14.1 G/DL (ref 12–17)
MCH RBC QN AUTO: 29.3 PG (ref 26.8–34.3)
MCHC RBC AUTO-ENTMCNC: 34.3 G/DL (ref 31.4–37.4)
MCV RBC AUTO: 85 FL (ref 82–98)
PLATELET # BLD AUTO: 275 THOUSANDS/UL (ref 149–390)
PMV BLD AUTO: 11.5 FL (ref 8.9–12.7)
POTASSIUM SERPL-SCNC: 4.4 MMOL/L (ref 3.5–5.3)
PROT SERPL-MCNC: 8.5 G/DL (ref 6.4–8.2)
RBC # BLD AUTO: 4.82 MILLION/UL (ref 3.88–5.62)
SODIUM SERPL-SCNC: 127 MMOL/L (ref 136–145)
T4 FREE SERPL-MCNC: 1 NG/DL (ref 0.76–1.46)
TSH SERPL DL<=0.05 MIU/L-ACNC: 5.44 UIU/ML (ref 0.36–3.74)
WBC # BLD AUTO: 8 THOUSAND/UL (ref 4.31–10.16)

## 2022-03-16 PROCEDURE — 80053 COMPREHEN METABOLIC PANEL: CPT

## 2022-03-16 PROCEDURE — 84443 ASSAY THYROID STIM HORMONE: CPT

## 2022-03-16 PROCEDURE — 85027 COMPLETE CBC AUTOMATED: CPT

## 2022-03-16 PROCEDURE — 36415 COLL VENOUS BLD VENIPUNCTURE: CPT

## 2022-03-16 PROCEDURE — 84439 ASSAY OF FREE THYROXINE: CPT

## 2022-03-16 PROCEDURE — 99214 OFFICE O/P EST MOD 30 MIN: CPT | Performed by: INTERNAL MEDICINE

## 2022-03-16 RX ORDER — FUROSEMIDE 20 MG/1
TABLET ORAL
Qty: 90 TABLET | Refills: 3 | Status: SHIPPED | OUTPATIENT
Start: 2022-03-16 | End: 2022-06-13

## 2022-03-16 NOTE — PATIENT INSTRUCTIONS
Discontinue gabapentin! Limit her fluid intake to less than 48 oz a day! Increase furosemide to 2 pills in the morning and 1 pill at noon!

## 2022-03-16 NOTE — ASSESSMENT & PLAN NOTE
Patient has coronary disease  He status post CABG  He denies chest pain or increasing shortness of breath    Continue aspirin and pravastatin

## 2022-03-16 NOTE — PROGRESS NOTES
Assessment/Plan:    Coronary artery disease involving native coronary artery of native heart without angina pectoris  Patient has coronary disease  He status post CABG  He denies chest pain or increasing shortness of breath  Continue aspirin and pravastatin    Essential hypertension  Patient has chronic hypertension  His blood pressure is stable  Continue metoprolol    He gained more than 10 lb since December  Lungs are clear to auscultation but he does have increasing lower extremity edema and increased abdominal girth  We discussed limiting fluid intake to 48 oz of fluid a day  I increased patient's Lasix to 40 mg in the morning and 20 mg at noon  I will check patient's electrolytes, renal function, liver function thyroid function  I referred patient to the [facility] for dietary instructions to help lose weight  Paroxysmal atrial fibrillation (HCC)  Patient's proximal atrial fibrillation  Heart rhythm is regular today  Denies palpitations  Continue Toprol    Varicose veins of both legs with edema  Patient has bilateral lower leg varicose veins with edema  He also has lymphedema  He also has pitting edema  He follows with physical therapy for lymphedema treatment and will have a compression dressing applied this week  I asked patient to limit his fluid intake to 40 oz a day  I increased patient's Lasix dose    Closed fracture of right hip [facility])  Patient is status post right hip fracture  Right hip pain is much improved  I am discontinuing gabapentin that may also contribute to patient's lower extremity edema and weight gain       Diagnoses and all orders for this visit:    Paroxysmal atrial fibrillation ([address] )    Coronary artery disease involving native coronary artery of native heart without angina pectoris    Essential hypertension  -     Comprehensive metabolic panel; Future  -     CBC; Future  -     TSH, 3rd generation with Free T4 reflex;  Future    Varicose veins of both legs with edema    Closed fracture of right hip with routine healing, subsequent encounter    S/P CABG (coronary artery bypass graft)  -     furosemide (LASIX) 20 mg tablet; 40 mg in the morning and 20 mg at noon    Pleural effusion on left  -     furosemide (LASIX) 20 mg tablet; 40 mg in the morning and 20 mg at noon    BMI 34 0-34 9,adult  -     Ambulatory referral to Nutrition Services; Future          Subjective:      Patient ID: Chidi Ann is a 66 y o  male  Patient presents for follow-up of chronic problems as detailed in the assessment and plan        The following portions of the patient's history were reviewed and updated as appropriate: current medications, past family history, past medical history, past social history, past surgical history and problem list     Review of Systems      Objective:    /70   Pulse 76   Resp 16   Ht 6' 5" (1 956 m)   Wt 131 kg (288 lb)   SpO2 96%   BMI 34 15 kg/m²      Physical Exam  HENT:      Head: Normocephalic  Eyes:      Conjunctiva/sclera: Conjunctivae normal    Cardiovascular:      Rate and Rhythm: Normal rate and regular rhythm  Heart sounds: No murmur heard  Pulmonary:      Effort: No respiratory distress  Breath sounds: No wheezing or rales  Abdominal:      General: Bowel sounds are normal       Tenderness: There is no abdominal tenderness  Musculoskeletal:      Cervical back: Neck supple  Skin:     General: Skin is warm and dry  Comments: Patient has lower extremity varicose veins with 1+ bilateral lower leg edema   Neurological:      Mental Status: He is alert  Cranial Nerves: No cranial nerve deficit  Motor: No weakness ( moves lower extremities)  Gait: Gait abnormal (Walks with cane)  Psychiatric:         Mood and Affect: Mood normal            Depression Screening and Follow-up Plan: Patient was screened for depression during today's encounter  They screened negative with a PHQ-2 score of 0          Armida Lesches Ary Naranjo MD  BMI Counseling: Body mass index is 34 15 kg/m²  The BMI is above normal  Nutrition recommendations include reducing portion sizes  Patient referred to nutritionist due to patient being obese

## 2022-03-16 NOTE — PROGRESS NOTES
I called pt re: lab results  I updated him  We discussed hyponatremia as well: will get labs I ordered tomorrow morning, he will limit fluid intake to 48 ounces a day and increase lasix dose as instructed, I asked him to decrease alcohol intake as well

## 2022-03-16 NOTE — ASSESSMENT & PLAN NOTE
Patient is status post right hip fracture  Right hip pain is much improved    I am discontinuing gabapentin that may also contribute to patient's lower extremity edema and weight gain

## 2022-03-16 NOTE — ASSESSMENT & PLAN NOTE
Patient has bilateral lower leg varicose veins with edema  He also has lymphedema  He also has pitting edema  He follows with physical therapy for lymphedema treatment and will have a compression dressing applied this week  I asked patient to limit his fluid intake to 40 oz a day    I increased patient's Lasix dose

## 2022-03-16 NOTE — ASSESSMENT & PLAN NOTE
Patient's proximal atrial fibrillation  Heart rhythm is regular today  Denies palpitations    Continue Toprol

## 2022-03-16 NOTE — ASSESSMENT & PLAN NOTE
Patient has chronic hypertension  His blood pressure is stable  Continue metoprolol    He gained more than 10 lb since December  Lungs are clear to auscultation but he does have increasing lower extremity edema and increased abdominal girth  We discussed limiting fluid intake to 48 oz of fluid a day  I increased patient's Lasix to 40 mg in the morning and 20 mg at noon  I will check patient's electrolytes, renal function, liver function thyroid function  I referred patient to the [facility] for dietary instructions to help lose weight

## 2022-03-17 ENCOUNTER — LAB (OUTPATIENT)
Dept: LAB | Facility: HOSPITAL | Age: 72
End: 2022-03-17
Attending: INTERNAL MEDICINE
Payer: MEDICARE

## 2022-03-17 DIAGNOSIS — E87.1 HYPONATREMIA: ICD-10-CM

## 2022-03-17 LAB
ANION GAP SERPL CALCULATED.3IONS-SCNC: 8 MMOL/L (ref 4–13)
BUN SERPL-MCNC: 14 MG/DL (ref 5–25)
CALCIUM SERPL-MCNC: 9.4 MG/DL (ref 8.3–10.1)
CHLORIDE SERPL-SCNC: 99 MMOL/L (ref 100–108)
CO2 SERPL-SCNC: 23 MMOL/L (ref 21–32)
CORTIS AM PEAK SERPL-MCNC: 13 UG/DL (ref 4.2–22.4)
CREAT SERPL-MCNC: 1.44 MG/DL (ref 0.6–1.3)
GFR SERPL CREATININE-BSD FRML MDRD: 47 ML/MIN/1.73SQ M
GLUCOSE P FAST SERPL-MCNC: 106 MG/DL (ref 65–99)
OSMOLALITY UR/SERPL-RTO: 283 MMOL/KG (ref 282–298)
OSMOLALITY UR: 292 MMOL/KG
POTASSIUM SERPL-SCNC: 4 MMOL/L (ref 3.5–5.3)
SODIUM 24H UR-SCNC: 78 MOL/L
SODIUM SERPL-SCNC: 130 MMOL/L (ref 136–145)

## 2022-03-17 PROCEDURE — 83930 ASSAY OF BLOOD OSMOLALITY: CPT

## 2022-03-17 PROCEDURE — 84300 ASSAY OF URINE SODIUM: CPT | Performed by: INTERNAL MEDICINE

## 2022-03-17 PROCEDURE — 36415 COLL VENOUS BLD VENIPUNCTURE: CPT

## 2022-03-17 PROCEDURE — 82533 TOTAL CORTISOL: CPT

## 2022-03-17 PROCEDURE — 83935 ASSAY OF URINE OSMOLALITY: CPT | Performed by: INTERNAL MEDICINE

## 2022-03-17 PROCEDURE — 80048 BASIC METABOLIC PNL TOTAL CA: CPT

## 2022-03-17 NOTE — PROGRESS NOTES
I called pt and made him aware of improving sodium  Will continue same dose of lasix and fluid restriction

## 2022-03-18 ENCOUNTER — HOSPITAL ENCOUNTER (OUTPATIENT)
Dept: SLEEP CENTER | Facility: HOSPITAL | Age: 72
Discharge: HOME | End: 2022-03-18
Attending: INTERNAL MEDICINE
Payer: MEDICARE

## 2022-03-18 ENCOUNTER — OFFICE VISIT (OUTPATIENT)
Dept: PHYSICAL THERAPY | Facility: CLINIC | Age: 72
End: 2022-03-18
Payer: MEDICARE

## 2022-03-18 DIAGNOSIS — G47.33 OSA (OBSTRUCTIVE SLEEP APNEA): ICD-10-CM

## 2022-03-18 DIAGNOSIS — I89.0 LYMPHEDEMA OF BOTH LOWER EXTREMITIES: Primary | ICD-10-CM

## 2022-03-18 PROCEDURE — 97140 MANUAL THERAPY 1/> REGIONS: CPT

## 2022-03-18 PROCEDURE — 95811 POLYSOM 6/>YRS CPAP 4/> PARM: CPT

## 2022-03-18 PROCEDURE — 97016 VASOPNEUMATIC DEVICE THERAPY: CPT

## 2022-03-18 NOTE — PROGRESS NOTES
Daily Note     Today's date: 3/18/2022  Patient name: Amna Loo  : 1950  MRN: [id number]  Referring provider: Mike Lopez  Dx:   Encounter Diagnosis     ICD-10-CM    1  Lymphedema of both lower extremities  I89 0                   Subjective: Pt states his legs feel the same  Pt states he didn't feel the pressure the last time he was here with compression pump  Objective: See treatment diary below      Assessment: Reviewed short stretch bandage wrapping with pt and spouse  Pt responded well to compression pump      Plan: Continue plan of care        Dx: B LE lymphedema  EPOC: 22  CO-MORBIDITIES: R hip fx  PERSONAL FACTORS:  Precautions: none      Manuals 3/7 3/18           B LE MLD/ CLT  25' +bandage                        Pneumatic compression pump B LE 35mmHG 15' 45mmHG 20'                        Neuro Re-Ed                                                                                                        Ther Ex             lymphedema dx and tx 8'                                                                                                       Ther Activity                                       Gait Training                                       Modalities

## 2022-03-19 NOTE — PROGRESS NOTES
Sleep Study Documentation    Pre-Sleep Study       Sleep testing procedure explained to patient:YES    Patient napped prior to study:YES- less than 30 minutes  Napped after 2PM: no    Caffeine:Dayshift worker after 12PM   Caffeine use:NO    Alcohol:Dayshift workers after 5PM: Alcohol use:NO    Typical day for patient:YES       Study Documentation    Sleep Study Indications: DONNA-diagnosed in-lab study    Sleep Study: Treatment   Optimal PAP pressure: 10 cm H2O  Leak:Small  Snore:Eliminated  REM Obtained:yes  Supplemental O2: no    Minimum SaO2 94%  Baseline SaO2 96%  PAP mask tried (list all) large ResMed AirFit N20 nasal interface  PAP mask choice (final) large ResMed AirFit N20 nasal interface  PAP mask type:nasal  PAP pressure at which snoring was eliminated 6 cm H2O  Minimum SaO2 at final PAP pressure 93%  Mode of Therapy:CPAP  ETCO2:No  CPAP changed to BiPAP:No    Mode of Therapy:CPAP    EKG abnormalities: no     EEG abnormalities: no    Sleep Study Recorded < 2 hours: N/A    Sleep Study Recorded > 2 hours but incomplete study: N/A    Sleep Study Recorded 6 hours but no sleep obtained: NO    Patient classification: retired       Post-Sleep Study    Medication used at bedtime or during sleep study:YES prescription sleep aid    Patient reports time it took to fall asleep:20 to 30 minutes    Patient reports waking up during study:1 to 2 times  Patient reports returning to sleep in greater than 30 minutes  Patient reports sleeping 6 to 8 hours without dreaming  Patient reports sleep during study:typical    Patient rated sleepiness: Not sleepy or tired    PAP treatment:yes: Post PAP treatment patient reports feeling worse and  would not wear PAP mask at home

## 2022-03-21 ENCOUNTER — OFFICE VISIT (OUTPATIENT)
Dept: PHYSICAL THERAPY | Facility: CLINIC | Age: 72
End: 2022-03-21
Payer: MEDICARE

## 2022-03-21 DIAGNOSIS — I89.0 LYMPHEDEMA OF BOTH LOWER EXTREMITIES: Primary | ICD-10-CM

## 2022-03-21 PROCEDURE — 97140 MANUAL THERAPY 1/> REGIONS: CPT

## 2022-03-21 PROCEDURE — 97016 VASOPNEUMATIC DEVICE THERAPY: CPT

## 2022-03-21 NOTE — PROGRESS NOTES
Daily Note     Today's date: 3/21/2022  Patient name: Jb Ziegler  : 1950  MRN: [id number]  Referring provider: Melecio Edwards  Dx:   Encounter Diagnosis     ICD-10-CM    1  Lymphedema of both lower extremities  I89 0                   Subjective: Pt states his legs feel lighter and jay  Objective: See treatment diary below      Assessment: Reviewed again with pt how to bandage LE, pt edema reduction is highly noticeable  Pt responded well to compression pump  Plan: Continue plan of care        Dx: B LE lymphedema  EPOC: 22  CO-MORBIDITIES: R hip fx  PERSONAL FACTORS:  Precautions: none      Manuals 3/7 3/18 3/21          B LE MLD/ CLT  25' +bandage 25'                       Pneumatic compression pump B LE 35mmHG 15' 45mmHG 20' 45mmHG  20'                       Neuro Re-Ed                                                                                                        Ther Ex             lymphedema dx and tx 8'                                                                                                       Ther Activity                                       Gait Training                                       Modalities

## 2022-03-23 ENCOUNTER — TELEPHONE (OUTPATIENT)
Dept: SLEEP CENTER | Facility: CLINIC | Age: 72
End: 2022-03-23

## 2022-03-23 NOTE — TELEPHONE ENCOUNTER
Called patient and advised sleep study resulted and APAP ordered  [facility]ME set up to 4/12/22 in [facility]  Appointment information emailed to Elpidio shaikh  Compliance follow up scheduled 8/2/22  No sooner appointments available at this time    Added to wait list

## 2022-03-24 ENCOUNTER — OFFICE VISIT (OUTPATIENT)
Dept: PHYSICAL THERAPY | Facility: CLINIC | Age: 72
End: 2022-03-24
Payer: MEDICARE

## 2022-03-24 DIAGNOSIS — I89.0 LYMPHEDEMA OF BOTH LOWER EXTREMITIES: Primary | ICD-10-CM

## 2022-03-24 PROCEDURE — 97140 MANUAL THERAPY 1/> REGIONS: CPT

## 2022-03-24 PROCEDURE — 97016 VASOPNEUMATIC DEVICE THERAPY: CPT

## 2022-03-24 NOTE — PROGRESS NOTES
Daily Note     Today's date: 3/24/2022  Patient name: Rick Lomeli  : 1950  MRN: [id number]  Referring provider: Adelfo Velasco  Dx:   Encounter Diagnosis     ICD-10-CM    1  Lymphedema of both lower extremities  I89 0                   Subjective: Pt states his legs feels better and will be getting his compression pump 3/24      Objective: See treatment diary below      Assessment: pt was measured for an RTW compression high sock  Pt tolerated well to compression pump  Plan: Continue plan of care        Dx: B LE lymphedema  EPOC: 22  CO-MORBIDITIES: R hip fx  PERSONAL FACTORS:  Precautions: none      Manuals 3/7 3/18 3/21 3/24    B LE MLD/ CLT  25' +bandage 25' 25'            Pneumatic compression pump B LE 35mmHG 15' 45mmHG 20' 45mmHG  20' 45mmHG  21'            Neuro Re-Ed                                                                Ther Ex        lymphedema dx and tx 8'                                                               Ther Activity                        Gait Training                        Modalities

## 2022-03-28 ENCOUNTER — OFFICE VISIT (OUTPATIENT)
Dept: PHYSICAL THERAPY | Facility: CLINIC | Age: 72
End: 2022-03-28
Payer: MEDICARE

## 2022-03-28 DIAGNOSIS — I89.0 LYMPHEDEMA OF BOTH LOWER EXTREMITIES: Primary | ICD-10-CM

## 2022-03-28 PROCEDURE — 97140 MANUAL THERAPY 1/> REGIONS: CPT | Performed by: PHYSICAL THERAPIST

## 2022-03-28 NOTE — PROGRESS NOTES
Daily Note     Today's date: 3/28/2022  Patient name: Paramjit French  : 1950  MRN: [id number]  Referring provider: Rosendo Cary  Dx:   Encounter Diagnosis     ICD-10-CM    1  Lymphedema of both lower extremities  I89 0                   Subjective: Pt reports he is using his pneumatic compression pump on a regular basis at home and his spouse is wrapping his legs  Pt is waiting for knee high compression stockings to arrive  Objective: See treatment diary below      Assessment: Tolerated treatment well  Patient was encouraged to get his toe nails trimmed by podiatry and to do ankle pumps on a regular basis  Plan: Shadenue for 1-2 more visits for final measurements and to instruct in stocking donning and doffing       Dx: B LE lymphedema  EPOC: 22  CO-MORBIDITIES: R hip fx  PERSONAL FACTORS:  Precautions: none      Manuals 3/7 3/18 3/21 3/24 3/28   B LE MLD/ CLT  25' +bandage 25' 25' 40'           Pneumatic compression pump B LE 35mmHG 15' 45mmHG 20' 45mmHG  20' 45mmHG  21'            Neuro Re-Ed                                                                Ther Ex        lymphedema dx and tx 8'                                                               Ther Activity                        Gait Training                        Modalities

## 2022-03-31 ENCOUNTER — APPOINTMENT (OUTPATIENT)
Dept: PHYSICAL THERAPY | Facility: CLINIC | Age: 72
End: 2022-03-31
Payer: MEDICARE

## 2022-04-04 ENCOUNTER — APPOINTMENT (OUTPATIENT)
Dept: PHYSICAL THERAPY | Facility: CLINIC | Age: 72
End: 2022-04-04
Payer: MEDICARE

## 2022-04-06 DIAGNOSIS — K21.9 GASTROESOPHAGEAL REFLUX DISEASE WITHOUT ESOPHAGITIS: ICD-10-CM

## 2022-04-06 RX ORDER — PANTOPRAZOLE SODIUM 40 MG/1
TABLET, DELAYED RELEASE ORAL
Qty: 90 TABLET | Refills: 1 | Status: SHIPPED | OUTPATIENT
Start: 2022-04-06

## 2022-04-07 ENCOUNTER — APPOINTMENT (OUTPATIENT)
Dept: PHYSICAL THERAPY | Facility: CLINIC | Age: 72
End: 2022-04-07
Payer: MEDICARE

## 2022-04-11 ENCOUNTER — APPOINTMENT (OUTPATIENT)
Dept: PHYSICAL THERAPY | Facility: CLINIC | Age: 72
End: 2022-04-11
Payer: MEDICARE

## 2022-04-14 ENCOUNTER — OFFICE VISIT (OUTPATIENT)
Dept: PHYSICAL THERAPY | Facility: CLINIC | Age: 72
End: 2022-04-14
Payer: MEDICARE

## 2022-04-14 DIAGNOSIS — I89.0 LYMPHEDEMA OF BOTH LOWER EXTREMITIES: Primary | ICD-10-CM

## 2022-04-14 PROCEDURE — 97140 MANUAL THERAPY 1/> REGIONS: CPT | Performed by: PHYSICAL THERAPIST

## 2022-04-14 PROCEDURE — 97110 THERAPEUTIC EXERCISES: CPT | Performed by: PHYSICAL THERAPIST

## 2022-04-14 NOTE — PROGRESS NOTES
PT Evaluation     Today's date: 2022  Patient name: Kirt Oppenheim  : 1950  MRN: [id number]  Referring provider: Vesta Floor  Dx:   Encounter Diagnosis     ICD-10-CM    1  Lymphedema of both lower extremities  I89 0                   Assessment  Assessment details: Since starting skilled PT, B LE edema has decreased very nicely  Pt is using a pneumatic compression pump on a daily basis and his spouse is independent and compliant with wrapping his legs  Pt has purchased closed toe, 20-30 mmHG large IV compression stockings  Pt's spouse is having difficulty putting the stockings on 2* hand weakness  Pt's compression stockings fit well  Pt may need to purchase open toe stockings for greater ease of donning stockings  Discussed that pt may need to wrap his legs at night to help to keep his swelling under control  Recommend pt be discharged from skilled PT at this time  Impairments: abnormal gait, activity intolerance and pain with function  Other impairment: increased B LE edema  Understanding of Dx/Px/POC: good   Prognosis: good    Goals  STG's ( 3-4 weeks)  1  Pt will be independent in HEP-met  2  Pt or spouse will be independent in LE compression wrapping-met  LTG's ( 6- 8 weeks)  1  Improve FOTO score by 4-6 points- met  2  Reduce B LE edema to max ability-met  3  Pt will be able to brenda and doff B Le knee high compression stockings-met    Plan  Other planned modality interventions: pneumatic compression pump  Other planned therapy interventions: MLD/ CLT  Frequency: D/c to HEP  Plan of Care beginning date: 3/7/2022  Plan of Care expiration date: 2022  Treatment plan discussed with: patient        Subjective Evaluation    History of Present Illness  Mechanism of injury: I E: Pt reports increased swelling in B LE's over the past 6 months, suddenly 2* unknown etiology  Pt was seen by his cardiologist  Pt denies venous insufficiency, cancer or radiation therapy   Pt had open heart surgery 7-8 years ago  Pt fractured his R hip 9/2021  Pt has increased difficulty walking and transferring sit to stand from the hip fracture  Pt notices the swelling in his legs and he is bothered by it  Pt can feel a slight tightness or "bloatedness" in his legs, but denies pain  Pt has increased difficulty putting on his shoes 2* his weight and decreased flexibility  4/14/22: Pt is able put on shoes with greater ease when he does not have his wraps applied  Work: retired  Hobbies: none  Gait: slow speed with SPC, mild antalgic  Pain  No pain reported    Social Support  Steps to enter house: yes  2  Stairs in house: yes   13  Lives in: multiple-level home    Employment status: not working  Treatments  No previous or current treatments  Patient Goals  Patient goals for therapy: decreased edema  Patient goal: to have less swelling in my ankles; to be able to travel to [address] [facility] in the Fall        Objective     Neurological Testing     Sensation     Hip   Left Hip   Intact: light touch    Right Hip   Intact: light touch    Reflexes   Left   Patellar (L4): normal (2+)  Achilles (S1): normal (2+)    Right   Patellar (L4): normal (2+)  Achilles (S1): normal (2+)    Strength/Myotome Testing     Left Hip   Planes of Motion   Flexion: 4  External rotation: 4+  Internal rotation: 4+    Right Hip   Planes of Motion   Flexion: 4  External rotation: 4+  Internal rotation: 4+    Left Knee   Flexion: 5  Extension: 5    Right Knee   Flexion: 5  Extension: 5    Additional Strength Details  (+) B LE varicose veins  Toe/ foot color is dark red to purple in toes  (+) pitting edema B LE's with columnar appearance            Dx:  B LE lymphedema  EPOC: 4/18/22  CO-MORBIDITIES: R hip fx  PERSONAL FACTORS:  Precautions: none      Manuals 3/7 4/14           B LE MLD/ CLT             Progress note  th           Pneumatic compression pump B LE 35mmHG 15'                         Neuro Re-Ed Ther Ex             lymphedema dx and tx 8'            Compression stocking donning/ lymphedema educ  15'                                                                                         Ther Activity                                       Gait Training                                       Modalities

## 2022-04-14 NOTE — LETTER
2022    [facility]  [address] [facility] [address]    Patient: Dustin Mejia   YOB: 1950   Date of Visit: 2022     Encounter Diagnosis     ICD-10-CM    1  Lymphedema of both lower extremities  I89 0        Dear Dr Miguel Crow:    Thank you for your recent referral of Dustin Mejia  Please review the attached evaluation summary from Obinna's recent visit  Please verify that you agree with the plan of care by signing the attached order  If you have any questions or concerns, please do not hesitate to call  I sincerely appreciate the opportunity to share in the care of one of your patients and hope to have another opportunity to work with you in the near future  Sincerely,    Hira Ernst, PT      Referring Provider:      I certify that I have read the below Plan of Care and certify the need for these services furnished under this plan of treatment while under my care  [facility] [address] [facility] [address]  Via Fax: [phone]          PT Evaluation     Today's date: 2022  Patient name: Dustin Mejia  : 1950  MRN: [id number]  Referring provider: Crystal Yarbrough  Dx:   Encounter Diagnosis     ICD-10-CM    1  Lymphedema of both lower extremities  I89 0                   Assessment  Assessment details: Since starting skilled PT, B LE edema has decreased very nicely  Pt is using a pneumatic compression pump on a daily basis and his spouse is independent and compliant with wrapping his legs  Pt has purchased closed toe, 20-30 mmHG large IV compression stockings  Pt's spouse is having difficulty putting the stockings on 2* hand weakness  Pt's compression stockings fit well  Pt may need to purchase open toe stockings for greater ease of donning stockings  Discussed that pt may need to wrap his legs at night to help to keep his swelling under control    Recommend pt be discharged from skilled PT at this time  Impairments: abnormal gait, activity intolerance and pain with function  Other impairment: increased B LE edema  Understanding of Dx/Px/POC: good   Prognosis: good    Goals  STG's ( 3-4 weeks)  1  Pt will be independent in HEP-met  2  Pt or spouse will be independent in LE compression wrapping-met  LTG's ( 6- 8 weeks)  1  Improve FOTO score by 4-6 points- met  2  Reduce B LE edema to max ability-met  3  Pt will be able to brenda and doff B Le knee high compression stockings-met    Plan  Other planned modality interventions: pneumatic compression pump  Other planned therapy interventions: MLD/ CLT  Frequency: D/c to HEP  Plan of Care beginning date: 3/7/2022  Plan of Care expiration date: 4/18/2022  Treatment plan discussed with: patient        Subjective Evaluation    History of Present Illness  Mechanism of injury: I E: Pt reports increased swelling in B LE's over the past 6 months, suddenly 2* unknown etiology  Pt was seen by his cardiologist  Pt denies venous insufficiency, cancer or radiation therapy  Pt had open heart surgery 7-8 years ago  Pt fractured his R hip 9/2021  Pt has increased difficulty walking and transferring sit to stand from the hip fracture  Pt notices the swelling in his legs and he is bothered by it  Pt can feel a slight tightness or "bloatedness" in his legs, but denies pain  Pt has increased difficulty putting on his shoes 2* his weight and decreased flexibility  4/14/22: Pt is able put on shoes with greater ease when he does not have his wraps applied      Work: retired  Hobbies: none  Gait: slow speed with SPC, mild antalgic  Pain  No pain reported    Social Support  Steps to enter house: yes  2  Stairs in house: yes   13  Lives in: multiple-level home    Employment status: not working  Treatments  No previous or current treatments  Patient Goals  Patient goals for therapy: decreased edema  Patient goal: to have less swelling in my ankles; to be able to travel to [facility] in the Fall        Objective     Neurological Testing     Sensation     Hip   Left Hip   Intact: light touch    Right Hip   Intact: light touch    Reflexes   Left   Patellar (L4): normal (2+)  Achilles (S1): normal (2+)    Right   Patellar (L4): normal (2+)  Achilles (S1): normal (2+)    Strength/Myotome Testing     Left Hip   Planes of Motion   Flexion: 4  External rotation: 4+  Internal rotation: 4+    Right Hip   Planes of Motion   Flexion: 4  External rotation: 4+  Internal rotation: 4+    Left Knee   Flexion: 5  Extension: 5    Right Knee   Flexion: 5  Extension: 5    Additional Strength Details  (+) B LE varicose veins  Toe/ foot color is dark red to purple in toes  (+) pitting edema B LE's with columnar appearance            Dx:  B LE lymphedema  EPOC: 4/18/22  CO-MORBIDITIES: R hip fx  PERSONAL FACTORS:  Precautions: none      Manuals 3/7 4/14           B LE MLD/ CLT             Progress note  th           Pneumatic compression pump B LE 35mmHG 15'                         Neuro Re-Ed                                                                                                        Ther Ex             lymphedema dx and tx 8'            Compression stocking donning/ lymphedema educ  15'                                                                                         Ther Activity                                       Gait Training                                       Modalities

## 2022-06-06 ENCOUNTER — LAB (OUTPATIENT)
Dept: LAB | Facility: HOSPITAL | Age: 72
End: 2022-06-06
Attending: INTERNAL MEDICINE
Payer: MEDICARE

## 2022-06-06 ENCOUNTER — TELEPHONE (OUTPATIENT)
Dept: FAMILY MEDICINE CLINIC | Facility: HOSPITAL | Age: 72
End: 2022-06-06

## 2022-06-06 DIAGNOSIS — I10 ESSENTIAL HYPERTENSION: Primary | ICD-10-CM

## 2022-06-06 DIAGNOSIS — I10 ESSENTIAL HYPERTENSION: ICD-10-CM

## 2022-06-06 DIAGNOSIS — Z00.00 GENERAL MEDICAL EXAM: ICD-10-CM

## 2022-06-06 LAB
ALBUMIN SERPL BCP-MCNC: 4 G/DL (ref 3.5–5)
ALP SERPL-CCNC: 80 U/L (ref 46–116)
ALT SERPL W P-5'-P-CCNC: 22 U/L (ref 12–78)
ANION GAP SERPL CALCULATED.3IONS-SCNC: 11 MMOL/L (ref 4–13)
AST SERPL W P-5'-P-CCNC: 18 U/L (ref 5–45)
BILIRUB SERPL-MCNC: 0.97 MG/DL (ref 0.2–1)
BUN SERPL-MCNC: 16 MG/DL (ref 5–25)
CALCIUM SERPL-MCNC: 9.6 MG/DL (ref 8.3–10.1)
CHLORIDE SERPL-SCNC: 96 MMOL/L (ref 100–108)
CO2 SERPL-SCNC: 23 MMOL/L (ref 21–32)
CREAT SERPL-MCNC: 1.56 MG/DL (ref 0.6–1.3)
GFR SERPL CREATININE-BSD FRML MDRD: 43 ML/MIN/1.73SQ M
GLUCOSE P FAST SERPL-MCNC: 100 MG/DL (ref 65–99)
POTASSIUM SERPL-SCNC: 4.1 MMOL/L (ref 3.5–5.3)
PROT SERPL-MCNC: 8.9 G/DL (ref 6.4–8.2)
SODIUM SERPL-SCNC: 130 MMOL/L (ref 136–145)

## 2022-06-06 PROCEDURE — 36415 COLL VENOUS BLD VENIPUNCTURE: CPT

## 2022-06-06 PROCEDURE — 80053 COMPREHEN METABOLIC PANEL: CPT

## 2022-06-06 NOTE — TELEPHONE ENCOUNTER
Patient going for labs for cardiology around noon today  Asking if Dr Lalya Montemayor could place orders for any labs he may want  Patient is fasting  Advised patient that dr Ana Lilia Casey may not be able to get labs ordered in that time frame  Pls let wife, Obadiah Meckel, know if labs can be ordered    [phone]

## 2022-06-13 DIAGNOSIS — J90 PLEURAL EFFUSION ON LEFT: ICD-10-CM

## 2022-06-13 DIAGNOSIS — Z95.1 S/P CABG (CORONARY ARTERY BYPASS GRAFT): ICD-10-CM

## 2022-06-13 RX ORDER — FUROSEMIDE 20 MG/1
TABLET ORAL
Qty: 270 TABLET | Refills: 1 | Status: SHIPPED | OUTPATIENT
Start: 2022-06-13

## 2022-06-14 DIAGNOSIS — E78.00 HYPERCHOLESTEREMIA: ICD-10-CM

## 2022-06-15 RX ORDER — PRAVASTATIN SODIUM 20 MG
20 TABLET ORAL DAILY
Qty: 90 TABLET | Refills: 1 | Status: SHIPPED | OUTPATIENT
Start: 2022-06-15 | End: 2022-07-21

## 2022-06-29 ENCOUNTER — TELEPHONE (OUTPATIENT)
Dept: FAMILY MEDICINE CLINIC | Facility: HOSPITAL | Age: 72
End: 2022-06-29

## 2022-07-08 ENCOUNTER — LAB (OUTPATIENT)
Dept: LAB | Facility: HOSPITAL | Age: 72
End: 2022-07-08
Attending: INTERNAL MEDICINE
Payer: MEDICARE

## 2022-07-08 DIAGNOSIS — I10 ESSENTIAL HYPERTENSION: ICD-10-CM

## 2022-07-08 DIAGNOSIS — I48.91 ATRIAL FIBRILLATION, UNSPECIFIED TYPE (HCC): ICD-10-CM

## 2022-07-08 LAB
ALBUMIN SERPL BCP-MCNC: 3.8 G/DL (ref 3.5–5)
ALP SERPL-CCNC: 79 U/L (ref 46–116)
ALT SERPL W P-5'-P-CCNC: 24 U/L (ref 12–78)
ANION GAP SERPL CALCULATED.3IONS-SCNC: 9 MMOL/L (ref 4–13)
AST SERPL W P-5'-P-CCNC: 22 U/L (ref 5–45)
BILIRUB SERPL-MCNC: 1.17 MG/DL (ref 0.2–1)
BUN SERPL-MCNC: 15 MG/DL (ref 5–25)
CALCIUM SERPL-MCNC: 9.5 MG/DL (ref 8.3–10.1)
CHLORIDE SERPL-SCNC: 95 MMOL/L (ref 100–108)
CO2 SERPL-SCNC: 23 MMOL/L (ref 21–32)
CREAT SERPL-MCNC: 1.57 MG/DL (ref 0.6–1.3)
GFR SERPL CREATININE-BSD FRML MDRD: 43 ML/MIN/1.73SQ M
GLUCOSE SERPL-MCNC: 92 MG/DL (ref 65–140)
POTASSIUM SERPL-SCNC: 4.4 MMOL/L (ref 3.5–5.3)
PROT SERPL-MCNC: 8.9 G/DL (ref 6.4–8.2)
SODIUM SERPL-SCNC: 127 MMOL/L (ref 136–145)

## 2022-07-08 PROCEDURE — 80053 COMPREHEN METABOLIC PANEL: CPT

## 2022-07-08 PROCEDURE — 36415 COLL VENOUS BLD VENIPUNCTURE: CPT

## 2022-07-09 NOTE — RESULT ENCOUNTER NOTE
Call patient: Joleen Boyer stable kidney function, low sodium that is also around baseline   I'm asking pt not to drink alcohol in case he resumed it

## 2022-07-14 ENCOUNTER — TELEPHONE (OUTPATIENT)
Dept: HEMATOLOGY ONCOLOGY | Facility: CLINIC | Age: 72
End: 2022-07-14

## 2022-07-14 ENCOUNTER — TELEPHONE (OUTPATIENT)
Dept: FAMILY MEDICINE CLINIC | Facility: HOSPITAL | Age: 72
End: 2022-07-14

## 2022-07-14 DIAGNOSIS — C90.00 MULTIPLE MYELOMA NOT HAVING ACHIEVED REMISSION (HCC): Primary | ICD-10-CM

## 2022-07-14 NOTE — TELEPHONE ENCOUNTER
Patients spouse calling to obtain fax number to have referral sent over for Hematology  Fax number was reviewed with her

## 2022-07-15 ENCOUNTER — PATIENT OUTREACH (OUTPATIENT)
Dept: HEMATOLOGY ONCOLOGY | Facility: CLINIC | Age: 72
End: 2022-07-15

## 2022-07-15 NOTE — PROGRESS NOTES
Intake received, chart reviewed  Pathology completed:N/A  Patient does NOT have a biopsy proven multiple myeloma - suspected  Imaging completed:N/A  All records needed are in patients chart  No further action required of Care Coordination team needed at this time

## 2022-07-21 ENCOUNTER — OFFICE VISIT (OUTPATIENT)
Dept: FAMILY MEDICINE CLINIC | Facility: HOSPITAL | Age: 72
End: 2022-07-21
Payer: MEDICARE

## 2022-07-21 ENCOUNTER — LAB (OUTPATIENT)
Dept: LAB | Facility: HOSPITAL | Age: 72
End: 2022-07-21
Attending: INTERNAL MEDICINE
Payer: MEDICARE

## 2022-07-21 VITALS
OXYGEN SATURATION: 96 % | DIASTOLIC BLOOD PRESSURE: 76 MMHG | HEIGHT: 77 IN | SYSTOLIC BLOOD PRESSURE: 118 MMHG | HEART RATE: 67 BPM | RESPIRATION RATE: 16 BRPM | WEIGHT: 276 LBS | BODY MASS INDEX: 32.59 KG/M2

## 2022-07-21 DIAGNOSIS — G62.9 NEUROPATHY: Primary | ICD-10-CM

## 2022-07-21 DIAGNOSIS — Z00.00 MEDICARE ANNUAL WELLNESS VISIT, SUBSEQUENT: ICD-10-CM

## 2022-07-21 DIAGNOSIS — E87.1 HYPONATREMIA: ICD-10-CM

## 2022-07-21 DIAGNOSIS — I25.10 CORONARY ARTERY DISEASE INVOLVING NATIVE CORONARY ARTERY OF NATIVE HEART WITHOUT ANGINA PECTORIS: ICD-10-CM

## 2022-07-21 DIAGNOSIS — I48.0 PAROXYSMAL ATRIAL FIBRILLATION (HCC): ICD-10-CM

## 2022-07-21 DIAGNOSIS — G62.9 NEUROPATHY: ICD-10-CM

## 2022-07-21 LAB
ANION GAP SERPL CALCULATED.3IONS-SCNC: 9 MMOL/L (ref 4–13)
BASOPHILS # BLD AUTO: 0.05 THOUSANDS/ΜL (ref 0–0.1)
BASOPHILS NFR BLD AUTO: 1 % (ref 0–1)
BUN SERPL-MCNC: 19 MG/DL (ref 5–25)
CALCIUM SERPL-MCNC: 9.4 MG/DL (ref 8.3–10.1)
CHLORIDE SERPL-SCNC: 96 MMOL/L (ref 96–108)
CO2 SERPL-SCNC: 21 MMOL/L (ref 21–32)
CREAT SERPL-MCNC: 1.55 MG/DL (ref 0.6–1.3)
EOSINOPHIL # BLD AUTO: 0.11 THOUSAND/ΜL (ref 0–0.61)
EOSINOPHIL NFR BLD AUTO: 1 % (ref 0–6)
ERYTHROCYTE [DISTWIDTH] IN BLOOD BY AUTOMATED COUNT: 13.7 % (ref 11.6–15.1)
GFR SERPL CREATININE-BSD FRML MDRD: 43 ML/MIN/1.73SQ M
GLUCOSE P FAST SERPL-MCNC: 98 MG/DL (ref 65–99)
HCT VFR BLD AUTO: 45.9 % (ref 36.5–49.3)
HGB BLD-MCNC: 15.7 G/DL (ref 12–17)
IMM GRANULOCYTES # BLD AUTO: 0.03 THOUSAND/UL (ref 0–0.2)
IMM GRANULOCYTES NFR BLD AUTO: 0 % (ref 0–2)
LYMPHOCYTES # BLD AUTO: 1.26 THOUSANDS/ΜL (ref 0.6–4.47)
LYMPHOCYTES NFR BLD AUTO: 16 % (ref 14–44)
MCH RBC QN AUTO: 29.8 PG (ref 26.8–34.3)
MCHC RBC AUTO-ENTMCNC: 34.2 G/DL (ref 31.4–37.4)
MCV RBC AUTO: 87 FL (ref 82–98)
MONOCYTES # BLD AUTO: 0.88 THOUSAND/ΜL (ref 0.17–1.22)
MONOCYTES NFR BLD AUTO: 11 % (ref 4–12)
NEUTROPHILS # BLD AUTO: 5.77 THOUSANDS/ΜL (ref 1.85–7.62)
NEUTS SEG NFR BLD AUTO: 71 % (ref 43–75)
NRBC BLD AUTO-RTO: 0 /100 WBCS
OSMOLALITY UR/SERPL-RTO: 273 MMOL/KG (ref 282–298)
OSMOLALITY UR: 509 MMOL/KG
PLATELET # BLD AUTO: 299 THOUSANDS/UL (ref 149–390)
PMV BLD AUTO: 11 FL (ref 8.9–12.7)
POTASSIUM SERPL-SCNC: 4.2 MMOL/L (ref 3.5–5.3)
RBC # BLD AUTO: 5.26 MILLION/UL (ref 3.88–5.62)
SODIUM 24H UR-SCNC: 20 MOL/L
SODIUM SERPL-SCNC: 126 MMOL/L (ref 135–147)
T4 FREE SERPL-MCNC: 1.16 NG/DL (ref 0.76–1.46)
TSH SERPL DL<=0.05 MIU/L-ACNC: 4.92 UIU/ML (ref 0.45–4.5)
WBC # BLD AUTO: 8.1 THOUSAND/UL (ref 4.31–10.16)

## 2022-07-21 PROCEDURE — 85025 COMPLETE CBC W/AUTO DIFF WBC: CPT

## 2022-07-21 PROCEDURE — 99214 OFFICE O/P EST MOD 30 MIN: CPT | Performed by: INTERNAL MEDICINE

## 2022-07-21 PROCEDURE — 80048 BASIC METABOLIC PNL TOTAL CA: CPT

## 2022-07-21 PROCEDURE — 84300 ASSAY OF URINE SODIUM: CPT

## 2022-07-21 PROCEDURE — 36415 COLL VENOUS BLD VENIPUNCTURE: CPT

## 2022-07-21 PROCEDURE — 84166 PROTEIN E-PHORESIS/URINE/CSF: CPT

## 2022-07-21 PROCEDURE — 83521 IG LIGHT CHAINS FREE EACH: CPT

## 2022-07-21 PROCEDURE — 84166 PROTEIN E-PHORESIS/URINE/CSF: CPT | Performed by: PATHOLOGY

## 2022-07-21 PROCEDURE — 84439 ASSAY OF FREE THYROXINE: CPT

## 2022-07-21 PROCEDURE — 84443 ASSAY THYROID STIM HORMONE: CPT

## 2022-07-21 PROCEDURE — 83935 ASSAY OF URINE OSMOLALITY: CPT

## 2022-07-21 PROCEDURE — G0439 PPPS, SUBSEQ VISIT: HCPCS | Performed by: INTERNAL MEDICINE

## 2022-07-21 PROCEDURE — 84165 PROTEIN E-PHORESIS SERUM: CPT | Performed by: PATHOLOGY

## 2022-07-21 PROCEDURE — 84165 PROTEIN E-PHORESIS SERUM: CPT

## 2022-07-21 PROCEDURE — 83930 ASSAY OF BLOOD OSMOLALITY: CPT

## 2022-07-21 RX ORDER — ZOLPIDEM TARTRATE 10 MG/1
10 TABLET ORAL
COMMUNITY
Start: 2022-06-09

## 2022-07-21 NOTE — RESULT ENCOUNTER NOTE
Call patient: Yuri Mcclellan his blood counts are normal, his sodium is 126 lower than last week  Kidney functions are stable  I'm still waiting for labs to come in

## 2022-07-21 NOTE — PROGRESS NOTES
Assessment and Plan:     Problem List Items Addressed This Visit        Cardiovascular and Mediastinum    Coronary artery disease involving native coronary artery of native heart without angina pectoris     Patient has coronary disease, he status post CABG in 2014  He he denies chest pains  Continue aspirin pravastatin and Toprol          Paroxysmal atrial fibrillation ([address] )     Patient has PAF  Heart rhythm is normal today  Continue Toprol and aspirin  Patient follows with Cardiology at [address] Orders    TSH, 3rd generation with Free T4 reflex       Nervous and Auditory    Neuropathy - Primary     Patient has neuropathy of the feet  On review of labs patient had a total protein of 8 9 and albumin of 3 8  I ordered SPEP UPEP and immunoglobulin free light chain evaluation look for multiple myeloma  I will recheck patient's TSH as it was mildly increased in March not requiring levothyroxine replacement  Patient's B12 level was normal in December last year and he has been taking B12 supplements by mouth  Relevant Orders    Protein electrophoresis, urine    Protein electrophoresis, serum    CBC and differential    Immunoglobulin free LT chains blood       Other    Hyponatremia     Patient had hyponatremia of 127 on July 8  He is asymptomatic  He is euvolemic today  I will recheck his sodium level along with serum and urine osmolarity           Relevant Orders    Basic metabolic panel    CBC and differential    Osmolality, urine    Osmolality-"If this is regarding a toxic alcohol, STOP  Test is not routinely indicated  Please consult medical  on call for further guidance "    Sodium, urine, random      Other Visit Diagnoses     Medicare annual wellness visit, subsequent              Depression Screening and Follow-up Plan: Patient was screened for depression during today's encounter  They screened negative with a PHQ-2 score of 0        Preventive health issues were discussed with patient, and age appropriate screening tests were ordered as noted in patient's After Visit Summary  Personalized health advice and appropriate referrals for health education or preventive services given if needed, as noted in patient's After Visit Summary  History of Present Illness:     Patient presents for a Medicare Wellness Visit    Patient was referred to Oncology and would like to find out why  He also inquired about the 2nd COVID 19 booster shot which I recommended to him because he plans to travel to [place] in September  Patient Care Team:  King Angelo MD as PCP - General (Internal Medicine)     Review of Systems:     Review of Systems   Constitutional: Negative for appetite change, fever and unexpected weight change  HENT: Negative for congestion  Eyes: Positive for visual disturbance ( wears glasses)  Respiratory: Positive for shortness of breath (After walking up 2 flights of stairs patient is to stop to catch his breath)  Cardiovascular: Negative for chest pain, palpitations and leg swelling  Gastrointestinal: Positive for constipation ( patient takes MiraLax)  Negative for abdominal pain, blood in stool and nausea  Genitourinary: Negative for difficulty urinating and hematuria  Musculoskeletal: Positive for gait problem  Neurological: Negative for weakness  Psychiatric/Behavioral: Negative for dysphoric mood          Problem List:     Patient Active Problem List   Diagnosis    Other insomnia    Hypercholesteremia    Gastroesophageal reflux disease without esophagitis    Essential hypertension    S/P CABG (coronary artery bypass graft)    Pleural effusion    Coronary artery disease involving native coronary artery of native heart without angina pectoris    Paroxysmal atrial fibrillation (HCC)    Rosacea    Other male erectile dysfunction    Nonrheumatic aortic valve insufficiency    Ambulatory dysfunction    B12 deficiency    Chronic rhinitis    Closed fracture of right hip (HCC)    Alcohol abuse    Varicose veins of both legs with edema    Spondylolisthesis at L5-S1 level    Neuropathy    Primary osteoarthritis of right knee    DONNA (obstructive sleep apnea)    Hyponatremia      Past Medical and Surgical History:     Past Medical History:   Diagnosis Date    Alcohol abuse     Sparks esophagus     Coronary artery disease     Fracture     neck    Hypertension      Past Surgical History:   Procedure Laterality Date    APPENDECTOMY      CARDIAC SURGERY  2014    CABG    HIP SURGERY      IR THORACENTESIS  4/29/2020    LIPOMA RESECTION      KS OPEN RX FEMUR FX+INTRAMED OTILIO Right 8/28/2021    Procedure: INSERTION NAIL IM FEMUR ANTEGRADE (TROCHANTERIC); Surgeon: Mauro Echeverria MD;  Location: [facility];  Service: Orthopedics    SPINE SURGERY  06/18/2019    Upper cervical fusion      Family History:     Family History   Problem Relation Age of Onset    Diabetes Brother     Heart disease Mother     COPD Neg Hx     Asthma Neg Hx     Substance Abuse Neg Hx     Mental illness Neg Hx       Social History:     Social History     Socioeconomic History    Marital status: /Civil Union     Spouse name: None    Number of children: None    Years of education: None    Highest education level: None   Occupational History    Occupation: retired teacher   Tobacco Use    Smoking status: Never Smoker    Smokeless tobacco: Never Used   Vaping Use    Vaping Use: Never used   Substance and Sexual Activity    Alcohol use: Yes     Alcohol/week: 3 0 standard drinks     Types: 3 Shots of liquor per week     Comment: , pts drinking is down, maybe 3 shots, but not everyday      Drug use: Never    Sexual activity: None   Other Topics Concern    None   Social History Narrative    Wears seatbelt    Lives with Wife    Living will     Sees dentist reg    Feels safe at home     Social Determinants of Health     Financial Resource Strain: Low Risk     Difficulty of Paying Living Expenses: Not hard at all   Food Insecurity: No Food Insecurity    Worried About Running Out of Food in the Last Year: Never true    Tai of Food in the Last Year: Never true   Transportation Needs: No Transportation Needs    Lack of Transportation (Medical): No    Lack of Transportation (Non-Medical): No   Physical Activity: Not on file   Stress: Not on file   Social Connections: Not on file   Intimate Partner Violence: Not on file   Housing Stability: Low Risk     Unable to Pay for Housing in the Last Year: No    Number of Places Lived in the Last Year: 1    Unstable Housing in the Last Year: No      Medications and Allergies:     Current Outpatient Medications   Medication Sig Dispense Refill    aspirin (ECOTRIN LOW STRENGTH) 81 mg EC tablet Take 81 mg by mouth daily      baclofen 10 mg tablet Take 1 tablet (10 mg total) by mouth daily at bedtime as needed for muscle spasms 20 tablet 0    Cholecalciferol (Vitamin D-3) 25 MCG (1000 UT) CAPS Take by mouth 1 daily      folic acid (FOLVITE) 1 mg tablet every 24 hours      furosemide (LASIX) 20 mg tablet TAKE 2 TABLETS BY MOUTH IN THE MORNING AND TAKE 1 TABLET AT NOON (Patient taking differently: TAKE 2 TABLETS BY MOUTH ON M/W/F,   AND TAKE 1 tab the other days ) 270 tablet 1    metoprolol succinate (TOPROL-XL) 50 mg 24 hr tablet TAKE 1 TABLET BY MOUTH TWICE A  tablet 1    pantoprazole (PROTONIX) 40 mg tablet TAKE 1 TABLET BY MOUTH EVERY DAY 90 tablet 1    Polyethylene Glycol 3350 (MIRALAX PO) Pt takes every 3 days      potassium chloride (K-DUR) 10 mEq tablet Takes 1 tab when he takes his Lasix  3    pravastatin (PRAVACHOL) 20 mg tablet TAKE 1 TABLET (20 MG TOTAL) BY MOUTH IN THE MORNING   90 tablet 1    sildenafil (REVATIO) 20 mg tablet Take 3 tablets (60 mg total) by mouth as needed (sexual activity) 30 tablet 1    thiamine (VITAMIN B1) 100 mg tablet Take 1 tablet (100 mg total) by mouth every 24 hours 30 tablet 5    vitamin B-12 (CYANOCOBALAMIN) 2000 MCG TABS Take 1 tablet (2,000 mcg total) by mouth daily 30 tablet 5    zolpidem (AMBIEN) 10 mg tablet Take 10 mg by mouth daily at bedtime       No current facility-administered medications for this visit  Allergies   Allergen Reactions    Ampicillin Lip Swelling     Other reaction(s): Unknown, Unknown Reaction      Immunizations:     Immunization History   Administered Date(s) Administered    COVID-19 PFIZER VACCINE 0 3 ML IM 03/08/2021, 04/01/2021    Fluzone Split Quad 0 5 mL 09/18/2014, 12/19/2016    H1N1, All Formulations 11/24/2009    INFLUENZA 09/18/2014, 10/08/2015, 12/19/2016, 11/23/2018, 10/07/2020    Influenza Split High Dose Preservative Free IM 09/28/2017, 11/23/2018, 10/01/2019    Influenza, high dose seasonal 0 7 mL 10/19/2021    Influenza, seasonal, injectable 11/17/2011    Influenza, seasonal, injectable, preservative free 10/10/2013, 10/07/2020    Pneumococcal Conjugate 13-Valent 06/12/2015    Pneumococcal Polysaccharide PPV23 04/07/2014    Tdap 08/28/2021    Zoster 01/08/2010      Health Maintenance:         Topic Date Due    Hepatitis C Screening  Never done    Colorectal Cancer Screening  08/28/2025         Topic Date Due    Pneumococcal Vaccine: 65+ Years (3 - PPSV23 or PCV20) 04/07/2019    COVID-19 Vaccine (3 - Pfizer risk series) 04/29/2021    Influenza Vaccine (1) 09/01/2022      Medicare Screening Tests and Risk Assessments:     Florentin Duque is here for his Subsequent Wellness visit  Health Risk Assessment:   Patient rates overall health as good  Patient feels that their physical health rating is same  Patient is satisfied with their life  Eyesight was rated as same  Hearing was rated as same  Patient feels that their emotional and mental health rating is same  Patients states they are never, rarely angry  Patient states they are never, rarely unusually tired/fatigued   Pain experienced in the last 7 days has been none  Patient states that he has experienced no weight loss or gain in last 6 months  Depression Screening:   PHQ-2 Score: 0      Fall Risk Screening: In the past year, patient has experienced: no history of falling in past year      Home Safety:  Patient has trouble with stairs inside or outside of their home  Patient has working smoke alarms and has working carbon monoxide detector  Home safety hazards include: none  Nutrition:   Current diet is Limited junk food and No Added Salt  Medications:   Patient is currently taking over-the-counter supplements  OTC medications include: see medication list  Patient is not able to manage medications  Wife helps pt    Activities of Daily Living (ADLs)/Instrumental Activities of Daily Living (IADLs):   Walk and transfer into and out of bed and chair?: Yes  Dress and groom yourself?: Yes    Bathe or shower yourself?: Yes    Feed yourself?  Yes  Do your laundry/housekeeping?: Yes  Manage your money, pay your bills and track your expenses?: Yes  Make your own meals?: Yes    Do your own shopping?: Yes    Previous Hospitalizations:   Any hospitalizations or ED visits within the last 12 months?: No      Advance Care Planning:   Living will: Yes    Advanced directive: Yes    Advanced directive counseling given: Yes    End of Life Decisions reviewed with patient: Yes      PREVENTIVE SCREENINGS      Cardiovascular Screening:    General: Screening Not Indicated, History Lipid Disorder and Risks and Benefits Discussed      Diabetes Screening:     General: Screening Current and Risks and Benefits Discussed      Colorectal Cancer Screening:     General: Screening Current      Prostate Cancer Screening:    General: Screening Current, Risks and Benefits Discussed, Screening Not Indicated and Patient Declines      Abdominal Aortic Aneurysm (AAA) Screening:    Risk factors include: age between 73-67 yo        Lung Cancer Screening:     General: Screening Not Indicated    Screening, Brief Intervention, and Referral to Treatment (SBIRT)    Screening  Typical number of drinks in a day: 0  Typical number of drinks in a week: 3  Interpretation: Low risk drinking behavior  Single Item Drug Screening:  How often have you used an illegal drug (including marijuana) or a prescription medication for non-medical reasons in the past year? never    Single Item Drug Screen Score: 0  Interpretation: Negative screen for possible drug use disorder    Brief Intervention  Alcohol & drug use screenings were reviewed  No concerns regarding substance use disorder identified  No exam data present     Physical Exam:     /76   Pulse 67   Resp 16   Ht 6' 5" (1 956 m)   Wt 125 kg (276 lb)   SpO2 96%   BMI 32 73 kg/m²     Physical Exam  Constitutional:       Appearance: He is well-developed  HENT:      Head: Normocephalic  Eyes:      Conjunctiva/sclera: Conjunctivae normal    Cardiovascular:      Rate and Rhythm: Normal rate and regular rhythm  Pulmonary:      Effort: No respiratory distress  Breath sounds: No wheezing or rales  Abdominal:      General: Bowel sounds are normal  There is no distension  Palpations: Abdomen is soft  There is no mass  Tenderness: There is no abdominal tenderness  Musculoskeletal:      Cervical back: Neck supple  Lymphadenopathy:      Cervical: No cervical adenopathy  Skin:     General: Skin is warm and dry  Findings: No erythema  Comments: Patient has lower extremity varicose veins without lower extremity edema today   Neurological:      Mental Status: He is alert and oriented to person, place, and time  Cranial Nerves: No cranial nerve deficit  Motor: No weakness  Gait: Gait abnormal (Walks with cane)     Psychiatric:         Mood and Affect: Mood normal           Mikayla Ty MD

## 2022-07-21 NOTE — ASSESSMENT & PLAN NOTE
Patient has PAF  Heart rhythm is normal today  Continue Toprol and aspirin    Patient follows with Cardiology at [facility]

## 2022-07-21 NOTE — ASSESSMENT & PLAN NOTE
Patient has coronary disease, he status post CABG in 2014  He he denies chest pains    Continue aspirin pravastatin and Toprol

## 2022-07-21 NOTE — ASSESSMENT & PLAN NOTE
Patient had hyponatremia of 127 on July 8  He is asymptomatic  He is euvolemic today    I will recheck his sodium level along with serum and urine osmolarity

## 2022-07-21 NOTE — PATIENT INSTRUCTIONS
Medicare Preventive Visit Patient Instructions  Thank you for completing your Welcome to Medicare Visit or Medicare Annual Wellness Visit today  Your next wellness visit will be due in one year (7/22/2023)  The screening/preventive services that you may require over the next 5-10 years are detailed below  Some tests may not apply to you based off risk factors and/or age  Screening tests ordered at today's visit but not completed yet may show as past due  Also, please note that scanned in results may not display below  Preventive Screenings:  Service Recommendations Previous Testing/Comments   Colorectal Cancer Screening  · Colonoscopy    · Fecal Occult Blood Test (FOBT)/Fecal Immunochemical Test (FIT)  · Fecal DNA/Cologuard Test  · Flexible Sigmoidoscopy Age: 54-65 years old   Colonoscopy: every 10 years (May be performed more frequently if at higher risk)  OR  FOBT/FIT: every 1 year  OR  Cologuard: every 3 years  OR  Sigmoidoscopy: every 5 years  Screening may be recommended earlier than age 48 if at higher risk for colorectal cancer  Also, an individualized decision between you and your healthcare provider will decide whether screening between the ages of 74-80 would be appropriate   Colonoscopy: 08/28/2015  FOBT/FIT: Not on file  Cologuard: Not on file  Sigmoidoscopy: Not on file    Screening Current     Prostate Cancer Screening Individualized decision between patient and health care provider in men between ages of 53-78   Medicare will cover every 12 months beginning on the day after your 50th birthday PSA: 1 3 ng/mL     Screening Current     Hepatitis C Screening Once for adults born between [facility]  More frequently in patients at high risk for Hepatitis C Hep C Antibody: Not on file        Diabetes Screening 1-2 times per year if you're at risk for diabetes or have pre-diabetes Fasting glucose: 100 mg/dL   A1C: No results in last 5 years    Screening Current   Cholesterol Screening Once every 5 years if you don't have a lipid disorder  May order more often based on risk factors  Lipid panel: 12/12/2020    Screening Not Indicated  History Lipid Disorder      Other Preventive Screenings Covered by Medicare:  1  Abdominal Aortic Aneurysm (AAA) Screening: covered once if your at risk  You're considered to be at risk if you have a family history of AAA or a male between the age of 73-68 who smoking at least 100 cigarettes in your lifetime  2  Lung Cancer Screening: covers low dose CT scan once per year if you meet all of the following conditions: (1) Age 50-69; (2) No signs or symptoms of lung cancer; (3) Current smoker or have quit smoking within the last 15 years; (4) You have a tobacco smoking history of at least 30 pack years (packs per day x number of years you smoked); (5) You get a written order from a healthcare provider  3  Glaucoma Screening: covered annually if you're considered high risk: (1) You have diabetes OR (2) Family history of glaucoma OR (3)  aged 48 and older OR (3)  American aged 72 and older  3  Osteoporosis Screening: covered every 2 years if you meet one of the following conditions: (1) Have a vertebral abnormality; (2) On glucocorticoid therapy for more than 3 months; (3) Have primary hyperparathyroidism; (4) On osteoporosis medications and need to assess response to drug therapy  5  HIV Screening: covered annually if you're between the age of 12-76  Also covered annually if you are younger than 13 and older than 72 with risk factors for HIV infection  For pregnant patients, it is covered up to 3 times per pregnancy      Immunizations:  Immunization Recommendations   Influenza Vaccine Annual influenza vaccination during flu season is recommended for all persons aged >= 6 months who do not have contraindications   Pneumococcal Vaccine (Prevnar and Pneumovax)  * Prevnar = PCV13  * Pneumovax = PPSV23 Adults 25-60 years old: 1-3 doses may be recommended based on certain risk factors  Adults 72 years old: Prevnar (PCV13) vaccine recommended followed by Pneumovax (PPSV23) vaccine  If already received PPSV23 since turning 65, then PCV13 recommended at least one year after PPSV23 dose  Hepatitis B Vaccine 3 dose series if at intermediate or high risk (ex: diabetes, end stage renal disease, liver disease)   Tetanus (Td) Vaccine - COST NOT COVERED BY MEDICARE PART B Following completion of primary series, a booster dose should be given every 10 years to maintain immunity against tetanus  Td may also be given as tetanus wound prophylaxis  Tdap Vaccine - COST NOT COVERED BY MEDICARE PART B Recommended at least once for all adults  For pregnant patients, recommended with each pregnancy  Shingles Vaccine (Shingrix) - COST NOT COVERED BY MEDICARE PART B  2 shot series recommended in those aged 48 and above     Health Maintenance Due:      Topic Date Due    Hepatitis C Screening  Never done    Colorectal Cancer Screening  08/28/2025     Immunizations Due:      Topic Date Due    Pneumococcal Vaccine: 65+ Years (3 - PPSV23 or PCV20) 04/07/2019    COVID-19 Vaccine (3 - Pfizer risk series) 04/29/2021    Influenza Vaccine (1) 09/01/2022     Advance Directives   What are advance directives? Advance directives are legal documents that state your wishes and plans for medical care  These plans are made ahead of time in case you lose your ability to make decisions for yourself  Advance directives can apply to any medical decision, such as the treatments you want, and if you want to donate organs  What are the types of advance directives? There are many types of advance directives, and each state has rules about how to use them  You may choose a combination of any of the following:  · Living will: This is a written record of the treatment you want  You can also choose which treatments you do not want, which to limit, and which to stop at a certain time   This includes surgery, medicine, IV fluid, and tube feedings  · Durable power of  for healthcare [facility]): This is a written record that states who you want to make healthcare choices for you when you are unable to make them for yourself  This person, called a proxy, is usually a family member or a friend  You may choose more than 1 proxy  · Do not resuscitate (DNR) order:  A DNR order is used in case your heart stops beating or you stop breathing  It is a request not to have certain forms of treatment, such as CPR  A DNR order may be included in other types of advance directives  · Medical directive: This covers the care that you want if you are in a coma, near death, or unable to make decisions for yourself  You can list the treatments you want for each condition  Treatment may include pain medicine, surgery, blood transfusions, dialysis, IV or tube feedings, and a ventilator (breathing machine)  · Values history: This document has questions about your views, beliefs, and how you feel and think about life  This information can help others choose the care that you would choose  Why are advance directives important? An advance directive helps you control your care  Although spoken wishes may be used, it is better to have your wishes written down  Spoken wishes can be misunderstood, or not followed  Treatments may be given even if you do not want them  An advance directive may make it easier for your family to make difficult choices about your care  Weight Management   Why it is important to manage your weight:  Being overweight increases your risk of health conditions such as heart disease, high blood pressure, type 2 diabetes, and certain types of cancer  It can also increase your risk for osteoarthritis, sleep apnea, and other respiratory problems  Aim for a slow, steady weight loss  Even a small amount of weight loss can lower your risk of health problems    How to lose weight safely:  A safe and healthy way to lose weight is to eat fewer calories and get regular exercise  You can lose up about 1 pound a week by decreasing the number of calories you eat by 500 calories each day  Healthy meal plan for weight management:  A healthy meal plan includes a variety of foods, contains fewer calories, and helps you stay healthy  A healthy meal plan includes the following:  · Eat whole-grain foods more often  A healthy meal plan should contain fiber  Fiber is the part of grains, fruits, and vegetables that is not broken down by your body  Whole-grain foods are healthy and provide extra fiber in your diet  Some examples of whole-grain foods are whole-wheat breads and pastas, oatmeal, brown rice, and bulgur  · Eat a variety of vegetables every day  Include dark, leafy greens such as spinach, kale, shaggy greens, and mustard greens  Eat yellow and orange vegetables such as carrots, sweet potatoes, and winter squash  · Eat a variety of fruits every day  Choose fresh or canned fruit (canned in its own juice or light syrup) instead of juice  Fruit juice has very little or no fiber  · Eat low-fat dairy foods  Drink fat-free (skim) milk or 1% milk  Eat fat-free yogurt and low-fat cottage cheese  Try low-fat cheeses such as mozzarella and other reduced-fat cheeses  · Choose meat and other protein foods that are low in fat  Choose beans or other legumes such as split peas or lentils  Choose fish, skinless poultry (chicken or turkey), or lean cuts of red meat (beef or pork)  Before you cook meat or poultry, cut off any visible fat  · Use less fat and oil  Try baking foods instead of frying them  Add less fat, such as margarine, sour cream, regular salad dressing and mayonnaise to foods  Eat fewer high-fat foods  Some examples of high-fat foods include french fries, doughnuts, ice cream, and cakes  · Eat fewer sweets  Limit foods and drinks that are high in sugar   This includes candy, cookies, regular soda, and sweetened drinks  Exercise:  Exercise at least 30 minutes per day on most days of the week  Some examples of exercise include walking, biking, dancing, and swimming  You can also fit in more physical activity by taking the stairs instead of the elevator or parking farther away from stores  Ask your healthcare provider about the best exercise plan for you  © Copyright [employer] 2018 Information is for End User's use only and may not be sold, redistributed or otherwise used for commercial purposes  All illustrations and images included in CareNotes® are the copyrighted property of [employer]  or [facility] Preventive Visit Patient Instructions  Thank you for completing your Welcome to Medicare Visit or Medicare Annual Wellness Visit today  Your next wellness visit will be due in one year (7/22/2023)  The screening/preventive services that you may require over the next 5-10 years are detailed below  Some tests may not apply to you based off risk factors and/or age  Screening tests ordered at today's visit but not completed yet may show as past due  Also, please note that scanned in results may not display below  Preventive Screenings:  Service Recommendations Previous Testing/Comments   Colorectal Cancer Screening  · Colonoscopy    · Fecal Occult Blood Test (FOBT)/Fecal Immunochemical Test (FIT)  · Fecal DNA/Cologuard Test  · Flexible Sigmoidoscopy Age: 54-65 years old   Colonoscopy: every 10 years (May be performed more frequently if at higher risk)  OR  FOBT/FIT: every 1 year  OR  Cologuard: every 3 years  OR  Sigmoidoscopy: every 5 years  Screening may be recommended earlier than age 48 if at higher risk for colorectal cancer  Also, an individualized decision between you and your healthcare provider will decide whether screening between the ages of 74-80 would be appropriate   Colonoscopy: 08/28/2015  FOBT/FIT: Not on file  Cologuard: Not on file  Sigmoidoscopy: Not on file    Screening Current     Prostate Cancer Screening Individualized decision between patient and health care provider in men between ages of 53-78   Medicare will cover every 12 months beginning on the day after your 50th birthday PSA: 1 3 ng/mL     Screening Current     Hepatitis C Screening Once for adults born between [facility]  More frequently in patients at high risk for Hepatitis C Hep C Antibody: Not on file        Diabetes Screening 1-2 times per year if you're at risk for diabetes or have pre-diabetes Fasting glucose: 100 mg/dL   A1C: No results in last 5 years    Screening Current   Cholesterol Screening Once every 5 years if you don't have a lipid disorder  May order more often based on risk factors  Lipid panel: 12/12/2020    Screening Not Indicated  History Lipid Disorder      Other Preventive Screenings Covered by Medicare:  6  Abdominal Aortic Aneurysm (AAA) Screening: covered once if your at risk  You're considered to be at risk if you have a family history of AAA or a male between the age of 73-68 who smoking at least 100 cigarettes in your lifetime  7  Lung Cancer Screening: covers low dose CT scan once per year if you meet all of the following conditions: (1) Age 50-69; (2) No signs or symptoms of lung cancer; (3) Current smoker or have quit smoking within the last 15 years; (4) You have a tobacco smoking history of at least 30 pack years (packs per day x number of years you smoked); (5) You get a written order from a healthcare provider  8  Glaucoma Screening: covered annually if you're considered high risk: (1) You have diabetes OR (2) Family history of glaucoma OR (3)  aged 48 and older OR (3)  American aged 72 and older  5   Osteoporosis Screening: covered every 2 years if you meet one of the following conditions: (1) Have a vertebral abnormality; (2) On glucocorticoid therapy for more than 3 months; (3) Have primary hyperparathyroidism; (4) On osteoporosis medications and need to assess response to drug therapy  10  HIV Screening: covered annually if you're between the age of 12-76  Also covered annually if you are younger than 13 and older than 72 with risk factors for HIV infection  For pregnant patients, it is covered up to 3 times per pregnancy  Immunizations:  Immunization Recommendations   Influenza Vaccine Annual influenza vaccination during flu season is recommended for all persons aged >= 6 months who do not have contraindications   Pneumococcal Vaccine (Prevnar and Pneumovax)  * Prevnar = PCV13  * Pneumovax = PPSV23 Adults 25-60 years old: 1-3 doses may be recommended based on certain risk factors  Adults 72 years old: Prevnar (PCV13) vaccine recommended followed by Pneumovax (PPSV23) vaccine  If already received PPSV23 since turning 65, then PCV13 recommended at least one year after PPSV23 dose  Hepatitis B Vaccine 3 dose series if at intermediate or high risk (ex: diabetes, end stage renal disease, liver disease)   Tetanus (Td) Vaccine - COST NOT COVERED BY MEDICARE PART B Following completion of primary series, a booster dose should be given every 10 years to maintain immunity against tetanus  Td may also be given as tetanus wound prophylaxis  Tdap Vaccine - COST NOT COVERED BY MEDICARE PART B Recommended at least once for all adults  For pregnant patients, recommended with each pregnancy  Shingles Vaccine (Shingrix) - COST NOT COVERED BY MEDICARE PART B  2 shot series recommended in those aged 48 and above     Health Maintenance Due:      Topic Date Due    Hepatitis C Screening  Never done    Colorectal Cancer Screening  08/28/2025     Immunizations Due:      Topic Date Due    Pneumococcal Vaccine: 65+ Years (3 - PPSV23 or PCV20) 04/07/2019    COVID-19 Vaccine (3 - Pfizer risk series) 04/29/2021    Influenza Vaccine (1) 09/01/2022     Advance Directives   What are advance directives?   Advance directives are legal documents that state your wishes and plans for medical care  These plans are made ahead of time in case you lose your ability to make decisions for yourself  Advance directives can apply to any medical decision, such as the treatments you want, and if you want to donate organs  What are the types of advance directives? There are many types of advance directives, and each state has rules about how to use them  You may choose a combination of any of the following:  · Living will: This is a written record of the treatment you want  You can also choose which treatments you do not want, which to limit, and which to stop at a certain time  This includes surgery, medicine, IV fluid, and tube feedings  · Durable power of  for healthcare [place] [facility]): This is a written record that states who you want to make healthcare choices for you when you are unable to make them for yourself  This person, called a proxy, is usually a family member or a friend  You may choose more than 1 proxy  · Do not resuscitate (DNR) order:  A DNR order is used in case your heart stops beating or you stop breathing  It is a request not to have certain forms of treatment, such as CPR  A DNR order may be included in other types of advance directives  · Medical directive: This covers the care that you want if you are in a coma, near death, or unable to make decisions for yourself  You can list the treatments you want for each condition  Treatment may include pain medicine, surgery, blood transfusions, dialysis, IV or tube feedings, and a ventilator (breathing machine)  · Values history: This document has questions about your views, beliefs, and how you feel and think about life  This information can help others choose the care that you would choose  Why are advance directives important? An advance directive helps you control your care  Although spoken wishes may be used, it is better to have your wishes written down  Spoken wishes can be misunderstood, or not followed   Treatments may be given even if you do not want them  An advance directive may make it easier for your family to make difficult choices about your care  Weight Management   Why it is important to manage your weight:  Being overweight increases your risk of health conditions such as heart disease, high blood pressure, type 2 diabetes, and certain types of cancer  It can also increase your risk for osteoarthritis, sleep apnea, and other respiratory problems  Aim for a slow, steady weight loss  Even a small amount of weight loss can lower your risk of health problems  How to lose weight safely:  A safe and healthy way to lose weight is to eat fewer calories and get regular exercise  You can lose up about 1 pound a week by decreasing the number of calories you eat by 500 calories each day  Healthy meal plan for weight management:  A healthy meal plan includes a variety of foods, contains fewer calories, and helps you stay healthy  A healthy meal plan includes the following:  · Eat whole-grain foods more often  A healthy meal plan should contain fiber  Fiber is the part of grains, fruits, and vegetables that is not broken down by your body  Whole-grain foods are healthy and provide extra fiber in your diet  Some examples of whole-grain foods are whole-wheat breads and pastas, oatmeal, brown rice, and bulgur  · Eat a variety of vegetables every day  Include dark, leafy greens such as spinach, kale, shaggy greens, and mustard greens  Eat yellow and orange vegetables such as carrots, sweet potatoes, and winter squash  · Eat a variety of fruits every day  Choose fresh or canned fruit (canned in its own juice or light syrup) instead of juice  Fruit juice has very little or no fiber  · Eat low-fat dairy foods  Drink fat-free (skim) milk or 1% milk  Eat fat-free yogurt and low-fat cottage cheese  Try low-fat cheeses such as mozzarella and other reduced-fat cheeses  · Choose meat and other protein foods that are low in fat  Choose beans or other legumes such as split peas or lentils  Choose fish, skinless poultry (chicken or turkey), or lean cuts of red meat (beef or pork)  Before you cook meat or poultry, cut off any visible fat  · Use less fat and oil  Try baking foods instead of frying them  Add less fat, such as margarine, sour cream, regular salad dressing and mayonnaise to foods  Eat fewer high-fat foods  Some examples of high-fat foods include french fries, doughnuts, ice cream, and cakes  · Eat fewer sweets  Limit foods and drinks that are high in sugar  This includes candy, cookies, regular soda, and sweetened drinks  Exercise:  Exercise at least 30 minutes per day on most days of the week  Some examples of exercise include walking, biking, dancing, and swimming  You can also fit in more physical activity by taking the stairs instead of the elevator or parking farther away from stores  Ask your healthcare provider about the best exercise plan for you  © Copyright [address] 2018 Information is for End User's use only and may not be sold, redistributed or otherwise used for commercial purposes   All illustrations and images included in CareNotes® are the copyrighted property of [employer]  or [address] [place] [address]

## 2022-07-21 NOTE — ASSESSMENT & PLAN NOTE
Patient has neuropathy of the feet  On review of labs patient had a total protein of 8 9 and albumin of 3 8  I ordered SPEP UPEP and immunoglobulin free light chain evaluation look for multiple myeloma  I will recheck patient's TSH as it was mildly increased in March not requiring levothyroxine replacement  Patient's B12 level was normal in December last year and he has been taking B12 supplements by mouth

## 2022-07-22 DIAGNOSIS — E87.1 HYPONATREMIA: Primary | ICD-10-CM

## 2022-07-22 LAB
ALBUMIN SERPL ELPH-MCNC: 4.67 G/DL (ref 3.5–5)
ALBUMIN SERPL ELPH-MCNC: 54.9 % (ref 52–65)
ALBUMIN UR ELPH-MCNC: 100 %
ALPHA1 GLOB MFR UR ELPH: 0 %
ALPHA1 GLOB SERPL ELPH-MCNC: 0.32 G/DL (ref 0.1–0.4)
ALPHA1 GLOB SERPL ELPH-MCNC: 3.8 % (ref 2.5–5)
ALPHA2 GLOB MFR UR ELPH: 0 %
ALPHA2 GLOB SERPL ELPH-MCNC: 0.87 G/DL (ref 0.4–1.2)
ALPHA2 GLOB SERPL ELPH-MCNC: 10.2 % (ref 7–13)
B-GLOBULIN MFR UR ELPH: 0 %
BETA GLOB ABNORMAL SERPL ELPH-MCNC: 0.44 G/DL (ref 0.4–0.8)
BETA1 GLOB SERPL ELPH-MCNC: 5.2 % (ref 5–13)
BETA2 GLOB SERPL ELPH-MCNC: 4.9 % (ref 2–8)
BETA2+GAMMA GLOB SERPL ELPH-MCNC: 0.42 G/DL (ref 0.2–0.5)
GAMMA GLOB ABNORMAL SERPL ELPH-MCNC: 1.79 G/DL (ref 0.5–1.6)
GAMMA GLOB MFR UR ELPH: 0 %
GAMMA GLOB SERPL ELPH-MCNC: 21 % (ref 12–22)
IGG/ALB SER: 1.22 {RATIO} (ref 1.1–1.8)
KAPPA LC FREE SER-MCNC: 54.4 MG/L (ref 3.3–19.4)
KAPPA LC FREE/LAMBDA FREE SER: 1.51 {RATIO} (ref 0.26–1.65)
LAMBDA LC FREE SERPL-MCNC: 36.1 MG/L (ref 5.7–26.3)
PROT PATTERN SERPL ELPH-IMP: ABNORMAL
PROT PATTERN UR ELPH-IMP: ABNORMAL
PROT SERPL-MCNC: 8.5 G/DL (ref 6.4–8.2)
PROT UR-MCNC: 27 MG/DL

## 2022-07-22 RX ORDER — SODIUM CHLORIDE 1000 MG
1 TABLET, SOLUBLE MISCELLANEOUS 2 TIMES DAILY
Qty: 14 TABLET | Refills: 1 | Status: SHIPPED | OUTPATIENT
Start: 2022-07-22 | End: 2022-07-28 | Stop reason: SDUPTHER

## 2022-07-22 NOTE — PROGRESS NOTES
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

## 2022-07-22 NOTE — RESULT ENCOUNTER NOTE
Call patient: Elissa Denis no bone marrow disorders that could cause high protein level in the blood!

## 2022-07-27 ENCOUNTER — LAB (OUTPATIENT)
Dept: LAB | Facility: HOSPITAL | Age: 72
End: 2022-07-27
Attending: INTERNAL MEDICINE
Payer: MEDICARE

## 2022-07-27 ENCOUNTER — CONSULT (OUTPATIENT)
Dept: HEMATOLOGY ONCOLOGY | Facility: HOSPITAL | Age: 72
End: 2022-07-27
Attending: INTERNAL MEDICINE
Payer: MEDICARE

## 2022-07-27 VITALS
HEIGHT: 77 IN | WEIGHT: 273 LBS | HEART RATE: 76 BPM | BODY MASS INDEX: 32.23 KG/M2 | OXYGEN SATURATION: 97 % | SYSTOLIC BLOOD PRESSURE: 110 MMHG | RESPIRATION RATE: 16 BRPM | DIASTOLIC BLOOD PRESSURE: 80 MMHG | TEMPERATURE: 96.6 F

## 2022-07-27 DIAGNOSIS — E87.1 HYPONATREMIA: Primary | ICD-10-CM

## 2022-07-27 DIAGNOSIS — E87.1 HYPONATREMIA: ICD-10-CM

## 2022-07-27 DIAGNOSIS — C90.00 MULTIPLE MYELOMA NOT HAVING ACHIEVED REMISSION (HCC): ICD-10-CM

## 2022-07-27 LAB
ANION GAP SERPL CALCULATED.3IONS-SCNC: 6 MMOL/L (ref 4–13)
BUN SERPL-MCNC: 11 MG/DL (ref 5–25)
CALCIUM SERPL-MCNC: 9.2 MG/DL (ref 8.3–10.1)
CHLORIDE SERPL-SCNC: 101 MMOL/L (ref 96–108)
CO2 SERPL-SCNC: 25 MMOL/L (ref 21–32)
CREAT SERPL-MCNC: 1.41 MG/DL (ref 0.6–1.3)
GFR SERPL CREATININE-BSD FRML MDRD: 49 ML/MIN/1.73SQ M
GLUCOSE P FAST SERPL-MCNC: 83 MG/DL (ref 65–99)
POTASSIUM SERPL-SCNC: 4.5 MMOL/L (ref 3.5–5.3)
SODIUM SERPL-SCNC: 132 MMOL/L (ref 135–147)

## 2022-07-27 PROCEDURE — 80048 BASIC METABOLIC PNL TOTAL CA: CPT

## 2022-07-27 PROCEDURE — 36415 COLL VENOUS BLD VENIPUNCTURE: CPT

## 2022-07-27 PROCEDURE — 99203 OFFICE O/P NEW LOW 30 MIN: CPT | Performed by: INTERNAL MEDICINE

## 2022-07-27 NOTE — RESULT ENCOUNTER NOTE
Call patient: Labs Show improvement in the sodium:  132  His sodium is in a safe range now  please continue fluid restriction of 48 oz a day and sodium chloride pill twice a day as ordered  I would like to recheck his sodium in a week to make sure that is heading in the right direction!

## 2022-07-28 DIAGNOSIS — E87.1 HYPONATREMIA: ICD-10-CM

## 2022-07-28 RX ORDER — SODIUM CHLORIDE 1000 MG
1 TABLET, SOLUBLE MISCELLANEOUS 2 TIMES DAILY
Qty: 14 TABLET | Refills: 1 | Status: SHIPPED | OUTPATIENT
Start: 2022-07-28

## 2022-07-30 NOTE — PROGRESS NOTES
Oncology Outpatient Consult Note  Rahel Moody 66 y o  male MRN: @ Encounter: [id number]        Date:  7/30/2022        CC:  Elevated total protein      HPI:  Rahel Moody is seen for initial consultation 7/30/2022 regarding his elevated total protein  He was referred by his cardiologist   Most recent echo did not show any finding that were concerning for amyloidosis  His other medical problems include afib, CABG in 2014, HTN, and neck fx in 2017 after a fall  His total protein level 8 5 on 7/21  SPEP and UPEP were negative for monoclonal protein  FLCR = 1 5  He has also had chronic hyponatremia and is on salt tabs  No neuropathy  He denies any new pain  He drank alcohol heavily in the past, but has cut back to 3 times a week now  He is a lifetime nonsmoker  He denies fevers or NS  No bleeding  No cough  +GRIGGS  Cr = 1 5, Ca = 9 4, hgb = 15 7  He denies any famhx of cancer  He is a retires   ROS: As stated in history of present illness otherwise her 14 point review of systems today was negative  ECOG PS: 1    Test Results:    Imaging: No results found      Labs:   Lab Results   Component Value Date    WBC 8 10 07/21/2022    HGB 15 7 07/21/2022    HCT 45 9 07/21/2022    MCV 87 07/21/2022     07/21/2022     Lab Results   Component Value Date    K 4 5 07/27/2022     07/27/2022    CO2 25 07/27/2022    BUN 11 07/27/2022    CREATININE 1 41 (H) 07/27/2022    GLUF 83 07/27/2022    CALCIUM 9 2 07/27/2022    AST 22 07/08/2022    ALT 24 07/08/2022    ALKPHOS 79 07/08/2022    EGFR 49 07/27/2022         Lab Results   Component Value Date    SPEP See Comment 07/21/2022    UPEP See Comment 07/21/2022       Lab Results   Component Value Date    PSA 1 3 08/30/2021       No results found for: CEA    No results found for: AFP    No results found for: IRON, TIBC, FERRITIN    Lab Results   Component Value Date    [id number] 750 12/30/2021               Active Problems: Patient Active Problem List   Diagnosis    Other insomnia    Hypercholesteremia    Gastroesophageal reflux disease without esophagitis    Essential hypertension    S/P CABG (coronary artery bypass graft)    Pleural effusion    Coronary artery disease involving native coronary artery of native heart without angina pectoris    Paroxysmal atrial fibrillation (HCC)    Rosacea    Other male erectile dysfunction    Nonrheumatic aortic valve insufficiency    Ambulatory dysfunction    B12 deficiency    Chronic rhinitis    Closed fracture of right hip (HCC)    Alcohol abuse    Varicose veins of both legs with edema    Spondylolisthesis at L5-S1 level    Neuropathy    Primary osteoarthritis of right knee    DONNA (obstructive sleep apnea)    Hyponatremia       Past Medical History:   Past Medical History:   Diagnosis Date    Alcohol abuse     Sparks esophagus     Coronary artery disease     Fracture     neck    Hypertension        Surgical History:   Past Surgical History:   Procedure Laterality Date    APPENDECTOMY      CARDIAC SURGERY  2014    CABG    HIP SURGERY      IR THORACENTESIS  4/29/2020    LIPOMA RESECTION      [place] OPEN RX FEMUR FX+INTRAMED OTILIO Right 8/28/2021    Procedure: INSERTION NAIL IM FEMUR ANTEGRADE (TROCHANTERIC); Surgeon: Chase Joseph MD;  Location:  MAIN OR;  Service: Orthopedics    SPINE SURGERY  06/18/2019    Upper cervical fusion       Family History:    Family History   Problem Relation Age of Onset    Diabetes Brother     Heart disease Mother     COPD Neg Hx     Asthma Neg Hx     Substance Abuse Neg Hx     Mental illness Neg Hx        Cancer-related family history is not on file      Social History:   Social History     Socioeconomic History    Marital status: /Civil Union     Spouse name: Not on file    Number of children: Not on file    Years of education: Not on file    Highest education level: Not on file   Occupational History    Occupation: retired teacher   Tobacco Use    Smoking status: Never Smoker    Smokeless tobacco: Never Used   Vaping Use    Vaping Use: Never used   Substance and Sexual Activity    Alcohol use: Yes     Alcohol/week: 3 0 standard drinks     Types: 3 Shots of liquor per week     Comment: , pts drinking is down, maybe 3 shots, but not everyday   Drug use: Never    Sexual activity: Not on file   Other Topics Concern    Not on file   Social History Narrative    Wears seatbelt    Lives with Wife    Living will     Sees dentist reg    Feels safe at home     Social Determinants of Health     Financial Resource Strain: Low Risk     Difficulty of Paying Living Expenses: Not hard at all   Food Insecurity: No Food Insecurity    Worried About Running Out of Food in the Last Year: Never true    [address] in the Last Year: Never true   Transportation Needs: No Transportation Needs    Lack of Transportation (Medical): No    Lack of Transportation (Non-Medical):  No   Physical Activity: Not on file   Stress: Not on file   Social Connections: Not on file   Intimate Partner Violence: Not on file   Housing Stability: Low Risk     Unable to Pay for Housing in the Last Year: No    Number of Places Lived in the Last Year: 1    Unstable Housing in the Last Year: No       Current Medications:   Current Outpatient Medications   Medication Sig Dispense Refill    aspirin (ECOTRIN LOW STRENGTH) 81 mg EC tablet Take 81 mg by mouth daily      baclofen 10 mg tablet Take 1 tablet (10 mg total) by mouth daily at bedtime as needed for muscle spasms 20 tablet 0    Cholecalciferol (Vitamin D-3) 25 MCG (1000 UT) CAPS Take by mouth 1 daily      folic acid (FOLVITE) 1 mg tablet every 24 hours      furosemide (LASIX) 20 mg tablet TAKE 2 TABLETS BY MOUTH IN THE MORNING AND TAKE 1 TABLET AT NOON (Patient taking differently: TAKE 2 TABLETS BY MOUTH ON M/W/F,   AND TAKE 1 tab the other days ) 270 tablet 1    metoprolol succinate (TOPROL-XL) 50 mg 24 hr tablet TAKE 1 TABLET BY MOUTH TWICE A  tablet 1    pantoprazole (PROTONIX) 40 mg tablet TAKE 1 TABLET BY MOUTH EVERY DAY 90 tablet 1    Polyethylene Glycol 3350 (MIRALAX PO) Pt takes every 3 days      potassium chloride (K-DUR) 10 mEq tablet Takes 1 tab when he takes his Lasix  3    sildenafil (REVATIO) 20 mg tablet Take 3 tablets (60 mg total) by mouth as needed (sexual activity) 30 tablet 1    sodium chloride 1 g tablet Take 1 tablet (1 g total) by mouth 2 (two) times a day 14 tablet 1    thiamine (VITAMIN B1) 100 mg tablet Take 1 tablet (100 mg total) by mouth every 24 hours 30 tablet 5    vitamin B-12 (CYANOCOBALAMIN) 2000 MCG TABS Take 1 tablet (2,000 mcg total) by mouth daily 30 tablet 5    zolpidem (AMBIEN) 10 mg tablet Take 10 mg by mouth daily at bedtime      pravastatin (PRAVACHOL) 20 mg tablet TAKE 1 TABLET (20 MG TOTAL) BY MOUTH IN THE MORNING  90 tablet 1     No current facility-administered medications for this visit  Allergies: Allergies   Allergen Reactions    Ampicillin Lip Swelling     Other reaction(s): Unknown, Unknown Reaction         Physical Exam:    Body surface area is 2 55 meters squared  Wt Readings from Last 3 Encounters:   07/27/22 124 kg (273 lb)   07/21/22 125 kg (276 lb)   03/16/22 131 kg (288 lb)        Temp Readings from Last 3 Encounters:   07/27/22 (!) 96 6 °F (35 9 °C) (Tympanic)   08/31/21 98 3 °F (36 8 °C)   08/18/20 97 5 °F (36 4 °C) (Tympanic)        BP Readings from Last 3 Encounters:   07/27/22 110/80   07/21/22 118/76   03/16/22 130/70         Pulse Readings from Last 3 Encounters:   07/27/22 76   07/21/22 67   03/16/22 76     @LASTSAO2(3)@    Physical Exam     Constitutional   General appearance: No acute distress, well appearing and well nourished  Eyes   Conjunctiva and lids: No swelling, erythema or discharge  Pupils and irises: Equal, round and reactive to light      Ears, Nose, Mouth, and Throat   External inspection of ears and nose: Normal     Nasal mucosa, septum, and turbinates: Normal without edema or erythema  Oropharynx: Normal with no erythema, edema, exudate or lesions  Pulmonary   Respiratory effort: No increased work of breathing or signs of respiratory distress  Auscultation of lungs: Clear to auscultation  Cardiovascular   Palpation of heart: Normal PMI, no thrills  Auscultation of heart: Normal rate and rhythm, normal S1 and S2, without murmurs  Examination of extremities for edema and/or varicosities: Normal     Carotid pulses: Normal     Abdomen   Abdomen: Non-tender, no masses  Liver and spleen: No hepatomegaly or splenomegaly  Lymphatic   Palpation of lymph nodes in neck: No lymphadenopathy  Musculoskeletal   Gait and station: Normal     Digits and nails: Normal without clubbing or cyanosis  Inspection/palpation of joints, bones, and muscles: Normal     Skin   Skin and subcutaneous tissue: Normal without rashes or lesions  Neurologic   Cranial nerves: Cranial nerves 2-12 intact  Sensation: No sensory loss  Psychiatric   Orientation to person, place, and time: Normal     Mood and affect: Normal     Ext - venous stasis changes        Assessment/ Plan:  65 yo male with a mildly elevated total protein  There is no monoclonal protein as a component of this elevation  FLCR is normal   There is no neuropathy and no signs of amyloidosis in his echo  He does not need a BMBx  He is getting a CXR and I would agree with this due to the hyponatremia  He is a non-smoker, so it is unlikely he has a SCLC, but checking a CXR is reasonable  I will see him in the future on an as needed basis  I spent 30 minutes in chart review, face to face counseling, coordination of care, and documentation

## 2022-08-09 DIAGNOSIS — I10 ESSENTIAL HYPERTENSION: ICD-10-CM

## 2022-08-09 RX ORDER — METOPROLOL SUCCINATE 50 MG/1
TABLET, EXTENDED RELEASE ORAL
Qty: 180 TABLET | Refills: 1 | Status: SHIPPED | OUTPATIENT
Start: 2022-08-09

## 2022-08-10 ENCOUNTER — LAB (OUTPATIENT)
Dept: LAB | Facility: HOSPITAL | Age: 72
End: 2022-08-10
Attending: INTERNAL MEDICINE
Payer: MEDICARE

## 2022-08-10 DIAGNOSIS — E87.1 HYPONATREMIA: ICD-10-CM

## 2022-08-10 LAB
ANION GAP SERPL CALCULATED.3IONS-SCNC: 9 MMOL/L (ref 4–13)
BUN SERPL-MCNC: 11 MG/DL (ref 5–25)
CALCIUM SERPL-MCNC: 9.6 MG/DL (ref 8.3–10.1)
CHLORIDE SERPL-SCNC: 102 MMOL/L (ref 96–108)
CO2 SERPL-SCNC: 22 MMOL/L (ref 21–32)
CREAT SERPL-MCNC: 1.42 MG/DL (ref 0.6–1.3)
GFR SERPL CREATININE-BSD FRML MDRD: 48 ML/MIN/1.73SQ M
GLUCOSE SERPL-MCNC: 101 MG/DL (ref 65–140)
POTASSIUM SERPL-SCNC: 4.1 MMOL/L (ref 3.5–5.3)
SODIUM SERPL-SCNC: 133 MMOL/L (ref 135–147)

## 2022-08-10 PROCEDURE — 80048 BASIC METABOLIC PNL TOTAL CA: CPT

## 2022-08-10 PROCEDURE — 36415 COLL VENOUS BLD VENIPUNCTURE: CPT

## 2022-08-10 NOTE — RESULT ENCOUNTER NOTE
Call patient: Sami Perez that his sodium is stable at 133, kidney function is also stable   He should continue the same fluid restriction of 48 ounces a day as it is working

## 2022-09-06 ENCOUNTER — APPOINTMENT (OUTPATIENT)
Dept: LAB | Facility: HOSPITAL | Age: 72
End: 2022-09-06
Payer: MEDICARE

## 2022-09-06 DIAGNOSIS — I48.91 ATRIAL FIBRILLATION, UNSPECIFIED TYPE (HCC): ICD-10-CM

## 2022-09-06 LAB
ALBUMIN SERPL BCP-MCNC: 3.8 G/DL (ref 3.5–5)
ALP SERPL-CCNC: 82 U/L (ref 46–116)
ALT SERPL W P-5'-P-CCNC: 28 U/L (ref 12–78)
ANION GAP SERPL CALCULATED.3IONS-SCNC: 8 MMOL/L (ref 4–13)
AST SERPL W P-5'-P-CCNC: 19 U/L (ref 5–45)
BILIRUB DIRECT SERPL-MCNC: 0.17 MG/DL (ref 0–0.2)
BILIRUB SERPL-MCNC: 0.9 MG/DL (ref 0.2–1)
BUN SERPL-MCNC: 12 MG/DL (ref 5–25)
CALCIUM SERPL-MCNC: 9.7 MG/DL (ref 8.3–10.1)
CHLORIDE SERPL-SCNC: 100 MMOL/L (ref 96–108)
CHOLEST SERPL-MCNC: 128 MG/DL
CK SERPL-CCNC: 49 U/L (ref 39–308)
CO2 SERPL-SCNC: 21 MMOL/L (ref 21–32)
CREAT SERPL-MCNC: 1.41 MG/DL (ref 0.6–1.3)
GFR SERPL CREATININE-BSD FRML MDRD: 48 ML/MIN/1.73SQ M
GLUCOSE P FAST SERPL-MCNC: 94 MG/DL (ref 65–99)
HDLC SERPL-MCNC: 51 MG/DL
LDLC SERPL CALC-MCNC: 59 MG/DL (ref 0–100)
NONHDLC SERPL-MCNC: 77 MG/DL
POTASSIUM SERPL-SCNC: 4.2 MMOL/L (ref 3.5–5.3)
PROT SERPL-MCNC: 8.7 G/DL (ref 6.4–8.4)
SODIUM SERPL-SCNC: 129 MMOL/L (ref 135–147)
TRIGL SERPL-MCNC: 91 MG/DL

## 2022-09-06 PROCEDURE — 36415 COLL VENOUS BLD VENIPUNCTURE: CPT

## 2022-09-06 PROCEDURE — 82550 ASSAY OF CK (CPK): CPT

## 2022-09-06 PROCEDURE — 80061 LIPID PANEL: CPT

## 2022-09-06 PROCEDURE — 80076 HEPATIC FUNCTION PANEL: CPT

## 2022-09-06 PROCEDURE — 80048 BASIC METABOLIC PNL TOTAL CA: CPT

## 2022-10-03 DIAGNOSIS — K21.9 GASTROESOPHAGEAL REFLUX DISEASE WITHOUT ESOPHAGITIS: ICD-10-CM

## 2022-10-03 RX ORDER — PANTOPRAZOLE SODIUM 40 MG/1
TABLET, DELAYED RELEASE ORAL
Qty: 90 TABLET | Refills: 1 | Status: SHIPPED | OUTPATIENT
Start: 2022-10-03

## 2022-11-15 ENCOUNTER — APPOINTMENT (OUTPATIENT)
Dept: LAB | Facility: HOSPITAL | Age: 72
End: 2022-11-15

## 2022-11-15 ENCOUNTER — RA CDI HCC (OUTPATIENT)
Dept: OTHER | Facility: HOSPITAL | Age: 72
End: 2022-11-15

## 2022-11-15 DIAGNOSIS — I48.91 ATRIAL FIBRILLATION, UNSPECIFIED TYPE (HCC): ICD-10-CM

## 2022-11-15 LAB
ALBUMIN SERPL BCP-MCNC: 3.5 G/DL (ref 3.5–5)
ALP SERPL-CCNC: 81 U/L (ref 46–116)
ALT SERPL W P-5'-P-CCNC: 45 U/L (ref 12–78)
ANION GAP SERPL CALCULATED.3IONS-SCNC: 11 MMOL/L (ref 4–13)
AST SERPL W P-5'-P-CCNC: 41 U/L (ref 5–45)
BILIRUB SERPL-MCNC: 0.87 MG/DL (ref 0.2–1)
BUN SERPL-MCNC: 15 MG/DL (ref 5–25)
CALCIUM SERPL-MCNC: 9.8 MG/DL (ref 8.3–10.1)
CHLORIDE SERPL-SCNC: 102 MMOL/L (ref 96–108)
CO2 SERPL-SCNC: 20 MMOL/L (ref 21–32)
CREAT SERPL-MCNC: 1.26 MG/DL (ref 0.6–1.3)
GFR SERPL CREATININE-BSD FRML MDRD: 55 ML/MIN/1.73SQ M
GLUCOSE P FAST SERPL-MCNC: 96 MG/DL (ref 65–99)
MAGNESIUM SERPL-MCNC: 2 MG/DL (ref 1.6–2.6)
POTASSIUM SERPL-SCNC: 4 MMOL/L (ref 3.5–5.3)
PROT SERPL-MCNC: 9 G/DL (ref 6.4–8.4)
SODIUM SERPL-SCNC: 133 MMOL/L (ref 135–147)

## 2022-11-15 NOTE — PROGRESS NOTES
N18 30  [facility]  coding opportunities          Chart Reviewed number of suggestions sent to Provider: 1     Patients Insurance     Medicare Insurance: [employer]

## 2022-11-22 ENCOUNTER — OFFICE VISIT (OUTPATIENT)
Dept: FAMILY MEDICINE CLINIC | Facility: HOSPITAL | Age: 72
End: 2022-11-22

## 2022-11-22 VITALS
RESPIRATION RATE: 16 BRPM | OXYGEN SATURATION: 96 % | HEIGHT: 77 IN | DIASTOLIC BLOOD PRESSURE: 78 MMHG | WEIGHT: 250 LBS | HEART RATE: 72 BPM | BODY MASS INDEX: 29.52 KG/M2 | SYSTOLIC BLOOD PRESSURE: 122 MMHG | TEMPERATURE: 97.6 F

## 2022-11-22 DIAGNOSIS — E87.1 HYPONATREMIA: ICD-10-CM

## 2022-11-22 DIAGNOSIS — J31.0 CHRONIC RHINITIS: ICD-10-CM

## 2022-11-22 DIAGNOSIS — I48.0 PAROXYSMAL ATRIAL FIBRILLATION (HCC): ICD-10-CM

## 2022-11-22 DIAGNOSIS — I10 ESSENTIAL HYPERTENSION: Primary | ICD-10-CM

## 2022-11-22 RX ORDER — SODIUM BICARBONATE 325 MG/1
325 TABLET ORAL DAILY
COMMUNITY
Start: 2022-10-04 | End: 2022-11-22

## 2022-11-22 RX ORDER — FUROSEMIDE 40 MG/1
TABLET ORAL
COMMUNITY
Start: 2022-08-31 | End: 2022-11-22

## 2022-11-22 NOTE — PATIENT INSTRUCTIONS
Try flonase/fluticasone nasal spray once spray twice a day each nostril  If you don't feel better in two weeks then we could try a nasal spray called ipratropium or referral to ENT

## 2022-11-22 NOTE — ASSESSMENT & PLAN NOTE
Patient had chest congestion with a cough in September that resolved but for about a month he has been complaining of intermittent sore throat  He has chronic nasal stuffiness and postnasal dripping  He had 3 teeth extracted last week I was wondering whether the sore throat could be associated with extractions  He denies fevers, chills, sinus pain, upper teeth pain, sneezing, cough, shortness of breath    Examination I only saw nasal congestion but saw no evidence of upper respiratory bacterial infection      Will try Flonase nasal spray, will consider ipratropium nasal spray or ENT referral if the nasal space dot help

## 2022-11-22 NOTE — PROGRESS NOTES
Assessment/Plan:    Essential hypertension  Patient has chronic hypertension  He lost 23 lb by decreasing his diet  Blood pressure is stable today  He will continue Toprol, furosemide  I will recheck his electrolytes renal function before next visit in 4 months    Paroxysmal atrial fibrillation [facility])  Patient has PAF  Denies palpitations  Heart rhythm is regular today  Continue Toprol  Patient will follow-up with his cardiologist this month    Hyponatremia  Patient has chronic hyponatremia  Sodium was 133 this month improved compared to September  Patient is euvolemic  Continue Lasix  I will recheck his sodium in 4 months  Also check his TSH which was very mildly elevated    Chronic rhinitis  Patient had chest congestion with a cough in September that resolved but for about a month he has been complaining of intermittent sore throat  He has chronic nasal stuffiness and postnasal dripping  He had 3 teeth extracted last week I was wondering whether the sore throat could be associated with extractions  He denies fevers, chills, sinus pain, upper teeth pain, sneezing, cough, shortness of breath    Examination I only saw nasal congestion but saw no evidence of upper respiratory bacterial infection  Will try Flonase nasal spray, will consider ipratropium nasal spray or ENT referral if the nasal space dot help       Diagnoses and all orders for this visit:    Essential hypertension  -     TSH, 3rd generation with Free T4 reflex; Future    Paroxysmal atrial fibrillation (HCC)    Chronic rhinitis    Hyponatremia  -     Basic metabolic panel; Future    Other orders  -     Discontinue: sodium bicarbonate 325 MG tablet; Take 325 mg by mouth daily  -     Discontinue: furosemide (LASIX) 40 mg tablet; TAKE 1 TAB MON, WED, FRI AND TAKE 1/2 TAB (20MG) SUN, TUES, THURS, SAT  Subjective:      Patient ID: Michelet Lobo is a 74 y o  male        HPI    Patient presents for follow-up of chronic conditions as detailed in the assessment and plan  The following portions of the patient's history were reviewed and updated as appropriate: current medications, past family history, past medical history, past social history, past surgical history and problem list     Review of Systems      Objective:    /78   Pulse 72   Temp 97 6 °F (36 4 °C) (Tympanic)   Resp 16   Ht 6' 5" (1 956 m)   Wt 113 kg (250 lb)   SpO2 96%   BMI 29 65 kg/m²      Physical Exam  Constitutional:       General: He is not in acute distress  Appearance: He is not toxic-appearing  HENT:      Head: Normocephalic  Nose: Congestion present  Mouth/Throat:      Mouth: Mucous membranes are moist       Pharynx: No oropharyngeal exudate or posterior oropharyngeal erythema  Eyes:      Conjunctiva/sclera: Conjunctivae normal    Cardiovascular:      Rate and Rhythm: Normal rate and regular rhythm  Pulmonary:      Effort: No respiratory distress  Breath sounds: No wheezing or rales  Abdominal:      General: Bowel sounds are normal  There is no distension  Palpations: Abdomen is soft  Tenderness: There is no abdominal tenderness  Musculoskeletal:      Cervical back: Neck supple  Lymphadenopathy:      Cervical: No cervical adenopathy  Skin:     Comments: Patient has lower extremity varicose veins without edema today   Neurological:      Mental Status: He is alert and oriented to person, place, and time  Cranial Nerves: No cranial nerve deficit  Motor: No weakness  Gait: Gait abnormal (Walks with a cane)     Psychiatric:         Mood and Affect: Mood normal              Lynda Gonzalez MD

## 2022-11-22 NOTE — ASSESSMENT & PLAN NOTE
Patient has chronic hypertension  He lost 23 lb by decreasing his diet  Blood pressure is stable today  He will continue Toprol, furosemide      I will recheck his electrolytes renal function before next visit in 4 months

## 2022-11-22 NOTE — ASSESSMENT & PLAN NOTE
Patient has PAF  Denies palpitations  Heart rhythm is regular today  Continue Toprol    Patient will follow-up with his cardiologist this month

## 2022-11-22 NOTE — ASSESSMENT & PLAN NOTE
Patient has chronic hyponatremia  Sodium was 133 this month improved compared to September  Patient is euvolemic  Continue Lasix  I will recheck his sodium in 4 months    Also check his TSH which was very mildly elevated

## 2022-11-23 ENCOUNTER — TELEPHONE (OUTPATIENT)
Dept: FAMILY MEDICINE CLINIC | Facility: HOSPITAL | Age: 72
End: 2022-11-23

## 2022-11-23 DIAGNOSIS — J31.0 CHRONIC RHINITIS: Primary | ICD-10-CM

## 2022-11-23 RX ORDER — IPRATROPIUM BROMIDE 42 UG/1
2 SPRAY, METERED NASAL 2 TIMES DAILY
Qty: 15 ML | Refills: 1 | Status: SHIPPED | OUTPATIENT
Start: 2022-11-23 | End: 2023-07-31 | Stop reason: SDUPTHER

## 2022-11-23 NOTE — TELEPHONE ENCOUNTER
Pt was seen yesterday and tried the OTC sore throat meds(Theraflu and Chloraseptic) that were recommended  States they haven't worked at all and asking if there is something that can be called in   PCB

## 2022-11-23 NOTE — TELEPHONE ENCOUNTER
Called PT - his wife said that he has been taking Tylenol for over 1 week with no relief  PT isn't taking the Flonase - said that he tried it one time and had a rash on his stomach and arms       Would still like an antibiotic - please advise

## 2022-12-05 DIAGNOSIS — E78.00 HYPERCHOLESTEREMIA: ICD-10-CM

## 2022-12-05 RX ORDER — PRAVASTATIN SODIUM 20 MG
20 TABLET ORAL DAILY
Qty: 90 TABLET | Refills: 1 | Status: SHIPPED | OUTPATIENT
Start: 2022-12-05 | End: 2023-01-04

## 2023-01-17 ENCOUNTER — APPOINTMENT (OUTPATIENT)
Dept: LAB | Facility: HOSPITAL | Age: 73
End: 2023-01-17

## 2023-01-17 DIAGNOSIS — E87.1 HYPOSMOLALITY SYNDROME: ICD-10-CM

## 2023-01-17 LAB
ANION GAP SERPL CALCULATED.3IONS-SCNC: 10 MMOL/L (ref 4–13)
BUN SERPL-MCNC: 19 MG/DL (ref 5–25)
CALCIUM SERPL-MCNC: 9.4 MG/DL (ref 8.3–10.1)
CHLORIDE SERPL-SCNC: 97 MMOL/L (ref 96–108)
CO2 SERPL-SCNC: 24 MMOL/L (ref 21–32)
CREAT SERPL-MCNC: 1.54 MG/DL (ref 0.6–1.3)
GFR SERPL CREATININE-BSD FRML MDRD: 43 ML/MIN/1.73SQ M
GLUCOSE SERPL-MCNC: 90 MG/DL (ref 65–140)
POTASSIUM SERPL-SCNC: 4 MMOL/L (ref 3.5–5.3)
SODIUM SERPL-SCNC: 131 MMOL/L (ref 135–147)

## 2023-03-22 ENCOUNTER — OFFICE VISIT (OUTPATIENT)
Dept: FAMILY MEDICINE CLINIC | Facility: HOSPITAL | Age: 73
End: 2023-03-22

## 2023-03-22 ENCOUNTER — LAB (OUTPATIENT)
Dept: LAB | Facility: HOSPITAL | Age: 73
End: 2023-03-22
Attending: INTERNAL MEDICINE

## 2023-03-22 VITALS
HEIGHT: 77 IN | WEIGHT: 233 LBS | OXYGEN SATURATION: 96 % | BODY MASS INDEX: 27.51 KG/M2 | HEART RATE: 77 BPM | RESPIRATION RATE: 16 BRPM | SYSTOLIC BLOOD PRESSURE: 119 MMHG | DIASTOLIC BLOOD PRESSURE: 70 MMHG

## 2023-03-22 DIAGNOSIS — E53.8 B12 DEFICIENCY: ICD-10-CM

## 2023-03-22 DIAGNOSIS — E87.1 HYPONATREMIA: ICD-10-CM

## 2023-03-22 DIAGNOSIS — I10 ESSENTIAL HYPERTENSION: Primary | ICD-10-CM

## 2023-03-22 DIAGNOSIS — I48.0 PAROXYSMAL ATRIAL FIBRILLATION (HCC): ICD-10-CM

## 2023-03-22 DIAGNOSIS — I25.10 CORONARY ARTERY DISEASE INVOLVING NATIVE CORONARY ARTERY OF NATIVE HEART WITHOUT ANGINA PECTORIS: ICD-10-CM

## 2023-03-22 DIAGNOSIS — R26.2 AMBULATORY DYSFUNCTION: ICD-10-CM

## 2023-03-22 DIAGNOSIS — I10 ESSENTIAL HYPERTENSION: ICD-10-CM

## 2023-03-22 LAB
ALBUMIN SERPL BCP-MCNC: 3.8 G/DL (ref 3.5–5)
ALP SERPL-CCNC: 71 U/L (ref 46–116)
ALT SERPL W P-5'-P-CCNC: 43 U/L (ref 12–78)
ANION GAP SERPL CALCULATED.3IONS-SCNC: 8 MMOL/L (ref 4–13)
AST SERPL W P-5'-P-CCNC: 51 U/L (ref 5–45)
BILIRUB SERPL-MCNC: 1.12 MG/DL (ref 0.2–1)
BUN SERPL-MCNC: 13 MG/DL (ref 5–25)
CALCIUM SERPL-MCNC: 9.4 MG/DL (ref 8.3–10.1)
CHLORIDE SERPL-SCNC: 97 MMOL/L (ref 96–108)
CO2 SERPL-SCNC: 22 MMOL/L (ref 21–32)
CREAT SERPL-MCNC: 1.21 MG/DL (ref 0.6–1.3)
ERYTHROCYTE [DISTWIDTH] IN BLOOD BY AUTOMATED COUNT: 12.7 % (ref 11.6–15.1)
GFR SERPL CREATININE-BSD FRML MDRD: 58 ML/MIN/1.73SQ M
GLUCOSE P FAST SERPL-MCNC: 99 MG/DL (ref 65–99)
HCT VFR BLD AUTO: 45.9 % (ref 36.5–49.3)
HGB BLD-MCNC: 15.8 G/DL (ref 12–17)
MCH RBC QN AUTO: 31.7 PG (ref 26.8–34.3)
MCHC RBC AUTO-ENTMCNC: 34.4 G/DL (ref 31.4–37.4)
MCV RBC AUTO: 92 FL (ref 82–98)
PLATELET # BLD AUTO: 225 THOUSANDS/UL (ref 149–390)
PMV BLD AUTO: 11.3 FL (ref 8.9–12.7)
POTASSIUM SERPL-SCNC: 4.1 MMOL/L (ref 3.5–5.3)
PROT SERPL-MCNC: 8.6 G/DL (ref 6.4–8.4)
RBC # BLD AUTO: 4.99 MILLION/UL (ref 3.88–5.62)
SODIUM SERPL-SCNC: 127 MMOL/L (ref 135–147)
TSH SERPL DL<=0.05 MIU/L-ACNC: 3.66 UIU/ML (ref 0.45–4.5)
VIT B12 SERPL-MCNC: 2553 PG/ML (ref 100–900)
WBC # BLD AUTO: 8.83 THOUSAND/UL (ref 4.31–10.16)

## 2023-03-22 RX ORDER — FUROSEMIDE 20 MG/1
40 TABLET ORAL EVERY OTHER DAY
Qty: 270 TABLET | Refills: 1
Start: 2023-03-22

## 2023-03-22 NOTE — ASSESSMENT & PLAN NOTE
Patient presents with episode of dysfunction that has been getting worse  He denies falls but he needs to use a walker because he feels unbalanced  He denies neck pain, low back pain, hip pain or knee pains  Has intermittent right lower leg numbness but no constant numbness in the feet or lower legs  He denies dizziness or lightheadedness on orthostatic changes  Lower extremity strength, muscle tone and muscle bulk was normal   He had range of motion in the knees and hips  I offered referral to physical therapy for strengthening, gait training  Patient declined that because he had participated in physical therapy before  I asked him to continue using walker at all times to prevent falls      I will check his sodium to make sure worsening hyponatremia is not the cause for his gait dysfunction

## 2023-03-22 NOTE — ASSESSMENT & PLAN NOTE
Patient has history of B12 deficiency    On rechecking his B12 level to make sure that deficiency is not contributing to gait dysfunction

## 2023-03-22 NOTE — ASSESSMENT & PLAN NOTE
Patient has coronary artery disease  He status post CABG  He denies chest pain    Continue aspirin, Toprol and pravastatin

## 2023-03-22 NOTE — ASSESSMENT & PLAN NOTE
Patient has PAF  Denies palpitations  Heart rhythm is regular today  Continue Toprol      He is not on anticoagulation because of gait dysfunction and falls

## 2023-03-22 NOTE — PROGRESS NOTES
Assessment/Plan:    Ambulatory dysfunction  Patient presents with episode of dysfunction that has been getting worse  He denies falls but he needs to use a walker because he feels unbalanced  He denies neck pain, low back pain, hip pain or knee pains  Has intermittent right lower leg numbness but no constant numbness in the feet or lower legs  He denies dizziness or lightheadedness on orthostatic changes  Lower extremity strength, muscle tone and muscle bulk was normal   He had range of motion in the knees and hips  I offered referral to physical therapy for strengthening, gait training  Patient declined that because he had participated in physical therapy before  I asked him to continue using walker at all times to prevent falls  I will check his sodium to make sure worsening hyponatremia is not the cause for his gait dysfunction    Essential hypertension  Patient is hypertension  Blood pressure is controlled today  He is not hypotensive  Continue Toprol and Lasix every other day    Paroxysmal atrial fibrillation [facility])  Patient has PAF  Denies palpitations  Heart rhythm is regular today  Continue Toprol  He is not on anticoagulation because of gait dysfunction and falls    Coronary artery disease involving native coronary artery of native heart without angina pectoris  Patient has coronary artery disease  He status post CABG  He denies chest pain  Continue aspirin, Toprol and pravastatin    Hyponatremia  Patient is chronic hyponatremia  I will check his sodium  Patient is euvolemic today  B12 deficiency  Patient has history of B12 deficiency  On rechecking his B12 level to make sure that deficiency is not contributing to gait dysfunction       Diagnoses and all orders for this visit:    Essential hypertension  -     Comprehensive metabolic panel; Future  -     CBC; Future  -     TSH, 3rd generation with Free T4 reflex; Future  -     furosemide (LASIX) 20 mg tablet;  Take 2 tablets (40 mg total) by mouth every other day    Paroxysmal atrial fibrillation (HCC)    Coronary artery disease involving native coronary artery of native heart without angina pectoris    B12 deficiency  -     Vitamin B12; Future    Hyponatremia    Ambulatory dysfunction          Subjective:      Patient ID: Sudhir Ortiz is a 74 y o  male  HPI    Patient presents for follow-up of chronic conditions as detailed in the assessment and plan  The following portions of the patient's history were reviewed and updated as appropriate: current medications, past family history, past medical history, past social history, past surgical history and problem list     Review of Systems      Objective:    /70   Pulse 77   Resp 16   Ht 6' 5" (1 956 m)   Wt 106 kg (233 lb)   SpO2 96%   BMI 27 63 kg/m²      Physical Exam  HENT:      Head: Normocephalic  Mouth/Throat:      Mouth: Mucous membranes are moist    Eyes:      Conjunctiva/sclera: Conjunctivae normal    Cardiovascular:      Rate and Rhythm: Normal rate and regular rhythm  Pulmonary:      Effort: No respiratory distress  Breath sounds: No wheezing or rales  Abdominal:      General: Bowel sounds are normal  There is no distension  Palpations: Abdomen is soft  There is no mass  Musculoskeletal:         General: No tenderness  Comments: Patient have some bow legs, bony hypertrophy of the knees without crackling  He has range of motion of knees and hips   Skin:     General: Skin is warm and dry  Comments: He has lower extremity varicose veins without ulcers or edema   Neurological:      Mental Status: He is alert and oriented to person, place, and time  Cranial Nerves: No cranial nerve deficit  Motor: No weakness (Lower extremity muscle strength, muscle tone, muscle bulk is normal)  Gait: Gait abnormal (Walks with walker)        Comments: Knee jerks were 2/4 equal, I could not elicit ankle jerks   Psychiatric: Mood and Affect: Mood normal          Thought Content: Thought content normal            Depression Screening and Follow-up Plan: Patient was screened for depression during today's encounter  They screened negative with a PHQ-2 score of 0          Parth Bajwa MD

## 2023-03-22 NOTE — ASSESSMENT & PLAN NOTE
Patient is hypertension  Blood pressure is controlled today  He is not hypotensive    Continue Toprol and Lasix every other day

## 2023-03-23 NOTE — RESULT ENCOUNTER NOTE
Call patient: Bonnie Montoya that his sodium is 127 which is lower than it was 2 months ago  I would decrease water intake and abstain from alcohol    Kidney function is at baseline, liver function tests are at baseline, thyroid function is normal, blood counts are normal

## 2023-04-01 DIAGNOSIS — E78.00 HYPERCHOLESTEREMIA: ICD-10-CM

## 2023-04-01 RX ORDER — PRAVASTATIN SODIUM 20 MG
20 TABLET ORAL DAILY
Qty: 90 TABLET | Refills: 1 | Status: SHIPPED | OUTPATIENT
Start: 2023-04-01 | End: 2023-05-01

## 2023-04-03 DIAGNOSIS — K21.9 GASTROESOPHAGEAL REFLUX DISEASE WITHOUT ESOPHAGITIS: ICD-10-CM

## 2023-04-03 RX ORDER — PANTOPRAZOLE SODIUM 40 MG/1
TABLET, DELAYED RELEASE ORAL
Qty: 90 TABLET | Refills: 1 | Status: SHIPPED | OUTPATIENT
Start: 2023-04-03

## 2023-07-21 ENCOUNTER — RA CDI HCC (OUTPATIENT)
Dept: OTHER | Facility: HOSPITAL | Age: 73
End: 2023-07-21

## 2023-07-21 NOTE — PROGRESS NOTES
N18.31   [address] coding opportunities          Chart Reviewed number of suggestions sent to Provider: 1     Patients Insurance     Medicare Insurance: [employer]

## 2023-07-25 ENCOUNTER — LAB (OUTPATIENT)
Dept: LAB | Facility: HOSPITAL | Age: 73
End: 2023-07-25
Payer: MEDICARE

## 2023-07-25 DIAGNOSIS — E87.1 HYPONATREMIA: ICD-10-CM

## 2023-07-25 LAB
ANION GAP SERPL CALCULATED.3IONS-SCNC: 9 MMOL/L
BUN SERPL-MCNC: 11 MG/DL (ref 5–25)
CALCIUM SERPL-MCNC: 9.3 MG/DL (ref 8.3–10.1)
CHLORIDE SERPL-SCNC: 101 MMOL/L (ref 96–108)
CO2 SERPL-SCNC: 24 MMOL/L (ref 21–32)
CREAT SERPL-MCNC: 1.3 MG/DL (ref 0.6–1.3)
GFR SERPL CREATININE-BSD FRML MDRD: 53 ML/MIN/1.73SQ M
GLUCOSE P FAST SERPL-MCNC: 89 MG/DL (ref 65–99)
POTASSIUM SERPL-SCNC: 3.5 MMOL/L (ref 3.5–5.3)
SODIUM SERPL-SCNC: 134 MMOL/L (ref 135–147)

## 2023-07-25 PROCEDURE — 36415 COLL VENOUS BLD VENIPUNCTURE: CPT

## 2023-07-25 PROCEDURE — 80048 BASIC METABOLIC PNL TOTAL CA: CPT

## 2023-07-31 ENCOUNTER — LAB (OUTPATIENT)
Dept: LAB | Facility: HOSPITAL | Age: 73
End: 2023-07-31
Payer: MEDICARE

## 2023-07-31 ENCOUNTER — OFFICE VISIT (OUTPATIENT)
Dept: FAMILY MEDICINE CLINIC | Facility: HOSPITAL | Age: 73
End: 2023-07-31
Payer: MEDICARE

## 2023-07-31 VITALS
OXYGEN SATURATION: 97 % | SYSTOLIC BLOOD PRESSURE: 120 MMHG | HEIGHT: 77 IN | RESPIRATION RATE: 16 BRPM | BODY MASS INDEX: 27.63 KG/M2 | WEIGHT: 234 LBS | DIASTOLIC BLOOD PRESSURE: 78 MMHG | HEART RATE: 73 BPM

## 2023-07-31 DIAGNOSIS — Z23 ENCOUNTER FOR IMMUNIZATION: ICD-10-CM

## 2023-07-31 DIAGNOSIS — Z00.00 MEDICARE ANNUAL WELLNESS VISIT, SUBSEQUENT: ICD-10-CM

## 2023-07-31 DIAGNOSIS — J31.0 CHRONIC RHINITIS: Primary | ICD-10-CM

## 2023-07-31 DIAGNOSIS — L73.9 FOLLICULITIS: ICD-10-CM

## 2023-07-31 DIAGNOSIS — F10.20 UNCOMPLICATED ALCOHOL DEPENDENCE (HCC): ICD-10-CM

## 2023-07-31 DIAGNOSIS — E78.00 HYPERCHOLESTEREMIA: ICD-10-CM

## 2023-07-31 DIAGNOSIS — N18.31 STAGE 3A CHRONIC KIDNEY DISEASE (HCC): ICD-10-CM

## 2023-07-31 PROBLEM — F10.10 ALCOHOL ABUSE: Status: RESOLVED | Noted: 2021-08-28 | Resolved: 2023-07-31

## 2023-07-31 LAB
CHOLEST SERPL-MCNC: 142 MG/DL
HDLC SERPL-MCNC: 58 MG/DL
LDLC SERPL CALC-MCNC: 62 MG/DL (ref 0–100)
NONHDLC SERPL-MCNC: 84 MG/DL
TRIGL SERPL-MCNC: 110 MG/DL

## 2023-07-31 PROCEDURE — 99213 OFFICE O/P EST LOW 20 MIN: CPT | Performed by: INTERNAL MEDICINE

## 2023-07-31 PROCEDURE — 36415 COLL VENOUS BLD VENIPUNCTURE: CPT

## 2023-07-31 PROCEDURE — G0439 PPPS, SUBSEQ VISIT: HCPCS | Performed by: INTERNAL MEDICINE

## 2023-07-31 PROCEDURE — 80061 LIPID PANEL: CPT

## 2023-07-31 RX ORDER — ERYTHROMYCIN 5 MG/G
0.5 OINTMENT OPHTHALMIC 3 TIMES DAILY
Qty: 3.5 G | Refills: 0 | Status: SHIPPED | OUTPATIENT
Start: 2023-07-31

## 2023-07-31 RX ORDER — SODIUM BICARBONATE 325 MG/1
325 TABLET ORAL DAILY
COMMUNITY
Start: 2023-07-03

## 2023-07-31 RX ORDER — IPRATROPIUM BROMIDE 42 UG/1
2 SPRAY, METERED NASAL 2 TIMES DAILY
Qty: 15 ML | Refills: 1 | Status: SHIPPED | OUTPATIENT
Start: 2023-07-31

## 2023-07-31 RX ORDER — ZOSTER VACCINE RECOMBINANT, ADJUVANTED 50 MCG/0.5
KIT INTRAMUSCULAR
Qty: 1 EACH | Refills: 1 | Status: SHIPPED | OUTPATIENT
Start: 2023-07-31

## 2023-07-31 NOTE — ASSESSMENT & PLAN NOTE
I advised pt to abstain from alcohol and offered naltrexone or campral  He will try to cut down on drinking but is not ready to consider abstinence. I made him aware of complications affecting his cognition, heart, liver and nerves.

## 2023-07-31 NOTE — PROGRESS NOTES
Assessment and Plan:     Problem List Items Addressed This Visit        Respiratory    Chronic rhinitis - Primary     Pt presents with cc of postnasal discharge, drip, frequent throat clearing going on for 4 months or so. Denies fever, sinus pain, heartbur, acid reflux. Has a scant cough, no wheezing or dyspnea. Physical exam of the nose and throat was benign today, I saw no signs of infection. Try antihistamin, atroven nasal spray, refer to ENT for evaluation. Relevant Medications    ipratropium (ATROVENT) 0.06 % nasal spray    Other Relevant Orders    Ambulatory Referral to Otolaryngology       Genitourinary    Stage 3a chronic kidney disease [facility])     Lab Results   Component Value Date    EGFR 53 07/25/2023    EGFR 58 03/22/2023    EGFR 43 01/17/2023    CREATININE 1.30 07/25/2023    CREATININE 1.21 03/22/2023    CREATININE 1.54 (H) 01/17/2023       He has stage 3a ckd, renal fxn has been stable  Denies urinary retention or hematuria  Follow closely  Avoid nsaids            Other    Hypercholesteremia    Relevant Orders    Lipid panel    Uncomplicated alcohol dependence ([address])     I advised pt to abstain from alcohol and offered naltrexone or campral  He will try to cut down on drinking but is not ready to consider abstinence. I made him aware of complications affecting his cognition, heart, liver and nerves. Other Visit Diagnoses     Medicare annual wellness visit, subsequent        Folliculitis        Relevant Medications    erythromycin (ILOTYCIN) ophthalmic ointment    Encounter for immunization        Relevant Medications    Zoster Vac Recomb Adjuvanted (Shingrix) 50 MCG/0.5ML SUSR          Depression Screening and Follow-up Plan: Patient was screened for depression during today's encounter. They screened negative with a PHQ-2 score of 0.       Preventive health issues were discussed with patient, and age appropriate screening tests were ordered as noted in patient's After Visit Summary. Personalized health advice and appropriate referrals for health education or preventive services given if needed, as noted in patient's After Visit Summary. History of Present Illness:     Patient presents for a Medicare Wellness Visit    HPI   Patient Care Team:  Mary Shepard MD as PCP - General (Internal Medicine)     Review of Systems:     Review of Systems   Constitutional: Negative for fever. HENT: Positive for postnasal drip. Negative for hearing loss. Eyes: Negative for visual disturbance. Respiratory: Positive for cough. Negative for shortness of breath. Cardiovascular: Negative for chest pain and palpitations. Gastrointestinal: Negative for abdominal pain, blood in stool, constipation and diarrhea. Endocrine: Negative for polydipsia and polyphagia. Genitourinary: Negative for dysuria and hematuria. Musculoskeletal: Positive for gait problem. Negative for arthralgias and myalgias. Skin: Negative for rash. Neurological: Negative for weakness and headaches. Psychiatric/Behavioral: Negative for dysphoric mood. All other systems reviewed and are negative.        Problem List:     Patient Active Problem List   Diagnosis   • Other insomnia   • Hypercholesteremia   • Gastroesophageal reflux disease without esophagitis   • Essential hypertension   • S/P CABG (coronary artery bypass graft)   • Pleural effusion   • Coronary artery disease involving native coronary artery of native heart without angina pectoris   • Paroxysmal atrial fibrillation (HCC)   • Rosacea   • Other male erectile dysfunction   • Nonrheumatic aortic valve insufficiency   • Ambulatory dysfunction   • B12 deficiency   • Chronic rhinitis   • Closed fracture of right hip (HCC)   • Varicose veins of both legs with edema   • Spondylolisthesis at L5-S1 level   • Neuropathy   • Primary osteoarthritis of right knee   • DONNA (obstructive sleep apnea)   • Hyponatremia   • Stage 3a chronic kidney disease ([address])   • Uncomplicated alcohol dependence ([address])      Past Medical and Surgical History:     Past Medical History:   Diagnosis Date   • Alcohol abuse    • Sparks esophagus    • Coronary artery disease    • Fracture     neck   • Hypertension      Past Surgical History:   Procedure Laterality Date   • APPENDECTOMY     • CARDIAC SURGERY  2014    CABG   • HIP SURGERY     • IR THORACENTESIS  4/29/2020   • LIPOMA RESECTION     • GA OPTX FEM SHFT FX W/INSJ IMED IMPLT W/WO SCREW Right 8/28/2021    Procedure: INSERTION NAIL IM FEMUR ANTEGRADE (TROCHANTERIC); Surgeon: Singh Santiago MD;  Location:  MAIN OR;  Service: Orthopedics   • SPINE SURGERY  06/18/2019    Upper cervical fusion      Family History:     Family History   Problem Relation Age of Onset   • Diabetes Brother    • Heart disease Mother    • COPD Neg Hx    • Asthma Neg Hx    • Substance Abuse Neg Hx    • Mental illness Neg Hx       Social History:     Social History     Socioeconomic History   • Marital status: /Civil Union     Spouse name: None   • Number of children: None   • Years of education: None   • Highest education level: None   Occupational History   • Occupation: retired teacher   Tobacco Use   • Smoking status: Never   • Smokeless tobacco: Never   Vaping Use   • Vaping Use: Never used   Substance and Sexual Activity   • Alcohol use: Yes     Alcohol/week: 3.0 standard drinks of alcohol     Types: 3 Shots of liquor per week     Comment: , pts drinking is down, maybe 3 shots, but not everyday.    • Drug use: Never   • Sexual activity: None   Other Topics Concern   • None   Social History Narrative    Wears seatbelt    Lives with Wife    Living will     Sees dentist reg    Feels safe at home     Social Determinants of Health     Financial Resource Strain: Low Risk  (7/31/2023)    Overall Financial Resource Strain (CARDIA)    • Difficulty of Paying Living Expenses: Not hard at all   Food Insecurity: No Food Insecurity (9/15/2021)    Hunger Vital Sign • Worried About [place] in the Last Year: Never true    • Ran Out of Food in the Last Year: Never true   Transportation Needs: No Transportation Needs (7/31/2023)    PRAPARE - Transportation    • Lack of Transportation (Medical): No    • Lack of Transportation (Non-Medical):  No   Physical Activity: Inactive (3/31/2021)    Exercise Vital Sign    • Days of Exercise per Week: 0 days    • Minutes of Exercise per Session: 0 min   Stress: No Stress Concern Present (3/31/2021)    [facility]    • Feeling of Stress : Not at all   Social Connections: Socially Isolated (3/31/2021)    Social Connection and Isolation Panel [NHANES]    • Frequency of Communication with Friends and Family: Twice a week    • Frequency of Social Gatherings with Friends and Family: Never    • Attends Gnosticist Services: Never    • Active Member of Clubs or Organizations: No    • Attends Club or Organization Meetings: Never    • Marital Status:    Intimate Partner Violence: Not At Risk (3/31/2021)    Humiliation, Afraid, Rape, and Kick questionnaire    • Fear of Current or Ex-Partner: No    • Emotionally Abused: No    • Physically Abused: No    • Sexually Abused: No   Housing Stability: Low Risk  (9/15/2021)    Housing Stability Vital Sign    • Unable to Pay for Housing in the Last Year: No    • Number of Places Lived in the Last Year: 1    • Unstable Housing in the Last Year: No      Medications and Allergies:     Current Outpatient Medications   Medication Sig Dispense Refill   • aspirin (ECOTRIN LOW STRENGTH) 81 mg EC tablet Take 81 mg by mouth daily     • baclofen 10 mg tablet Take 1 tablet (10 mg total) by mouth daily at bedtime as needed for muscle spasms 20 tablet 0   • Cholecalciferol (Vitamin D-3) 25 MCG (1000 UT) CAPS Take by mouth 1 daily     • erythromycin (ILOTYCIN) ophthalmic ointment Administer 0.5 inches to the right eye 3 (three) times a day 3.5 g 0   • folic acid (FOLVITE) 1 mg tablet every 24 hours     • furosemide (LASIX) 20 mg tablet Take 2 tablets (40 mg total) by mouth every other day 270 tablet 1   • ipratropium (ATROVENT) 0.06 % nasal spray 2 sprays into each nostril 2 (two) times a day 15 mL 1   • metoprolol succinate (TOPROL-XL) 50 mg 24 hr tablet TAKE 1 TABLET BY MOUTH TWICE A  tablet 1   • pantoprazole (PROTONIX) 40 mg tablet TAKE 1 TABLET BY MOUTH EVERY DAY 90 tablet 1   • Polyethylene Glycol 3350 (MIRALAX PO) Pt takes every 3 days     • potassium chloride (K-DUR) 10 mEq tablet Takes 1 tab when he takes his Lasix  3   • pravastatin (PRAVACHOL) 20 mg tablet TAKE 1 TABLET (20 MG TOTAL) BY MOUTH IN THE MORNING 90 tablet 1   • sildenafil (REVATIO) 20 mg tablet Take 3 tablets (60 mg total) by mouth as needed (sexual activity) 30 tablet 1   • sodium bicarbonate 325 MG tablet Take 325 mg by mouth daily     • thiamine (VITAMIN B1) 100 mg tablet Take 1 tablet (100 mg total) by mouth every 24 hours 30 tablet 5   • vitamin B-12 (CYANOCOBALAMIN) 2000 MCG TABS Take 1 tablet (2,000 mcg total) by mouth daily 30 tablet 5   • zolpidem (AMBIEN) 10 mg tablet Take 10 mg by mouth daily at bedtime     • Zoster Vac Recomb Adjuvanted (Shingrix) 50 MCG/0.5ML SUSR 0.5mL IM for one dose, followed by 0.5mL IM 2-6 months after first dose 1 each 1     No current facility-administered medications for this visit.      Allergies   Allergen Reactions   • Ampicillin Lip Swelling     Other reaction(s): Unknown, Unknown Reaction      Immunizations:     Immunization History   Administered Date(s) Administered   • COVID-19 PFIZER VACCINE 0.3 ML IM 03/08/2021, 04/01/2021   • COVID-19 Pfizer Vac BIVALENT Frandy-sucrose 12 Yr+ IM (BOOSTER ONLY) 10/11/2022   • Fluzone Split Quad 0.5 mL 09/18/2014, 12/19/2016   • H1N1, All Formulations 11/24/2009   • INFLUENZA 09/18/2014, 10/08/2015, 12/19/2016, 11/23/2018, 10/07/2020   • Influenza Split High Dose Preservative Free IM 09/28/2017, 11/23/2018, 10/01/2019   • Influenza, high dose seasonal 0.7 mL 10/19/2021, 10/11/2022   • Influenza, seasonal, injectable 11/17/2011   • Influenza, seasonal, injectable, preservative free 10/10/2013, 10/07/2020   • Pneumococcal Conjugate 13-Valent 06/12/2015   • Pneumococcal Polysaccharide PPV23 04/07/2014   • Tdap 08/28/2021   • Zoster 01/08/2010      Health Maintenance:         Topic Date Due   • Hepatitis C Screening  Never done   • Colorectal Cancer Screening  08/28/2025         Topic Date Due   • COVID-19 Vaccine (4 - Pfizer series) 02/11/2023   • Influenza Vaccine (1) 09/01/2023      Medicare Screening Tests and Risk Assessments:     Serafin Nichols is here for his Subsequent Wellness visit. Health Risk Assessment:   Patient rates overall health as good. Patient feels that their physical health rating is same. Patient is satisfied with their life. Eyesight was rated as same. Hearing was rated as same. Patient feels that their emotional and mental health rating is same. Patients states they are never, rarely angry. Patient states they are never, rarely unusually tired/fatigued. Pain experienced in the last 7 days has been none. Patient states that he has experienced weight loss or gain in last 6 months. Has lost wt. Depression Screening:   PHQ-2 Score: 0      Fall Risk Screening: In the past year, patient has experienced: no history of falling in past year      Home Safety:  Patient has trouble with stairs inside or outside of their home. Patient has working smoke alarms and has working carbon monoxide detector. Home safety hazards include: none. Nutrition:   Current diet is Regular. Medications:   Patient is currently taking over-the-counter supplements. OTC medications include: see medication list. Patient is able to manage medications.      Activities of Daily Living (ADLs)/Instrumental Activities of Daily Living (IADLs):   Walk and transfer into and out of bed and chair?: Yes  Dress and groom yourself?: Yes    Bathe or shower yourself?: Yes    Feed yourself? Yes  Do your laundry/housekeeping?: Yes  Manage your money, pay your bills and track your expenses?: Yes  Make your own meals?: Yes    Do your own shopping?: Yes    Previous Hospitalizations:   Any hospitalizations or ED visits within the last 12 months?: No      Advance Care Planning:   Living will: Yes    Advanced directive: Yes    Advanced directive counseling given: Yes      PREVENTIVE SCREENINGS      Cardiovascular Screening:    General: Screening Not Indicated, History Lipid Disorder, Risks and Benefits Discussed and Screening Current    Due for: Lipid Panel      Diabetes Screening:     General: Screening Current and Risks and Benefits Discussed      Colorectal Cancer Screening:     General: Screening Current      Prostate Cancer Screening:    General: Risks and Benefits Discussed and Screening Not Indicated      Abdominal Aortic Aneurysm (AAA) Screening:    Risk factors include: age between 70-77 yo        Lung Cancer Screening:     General: Screening Not Indicated    Screening, Brief Intervention, and Referral to Treatment (SBIRT)    Screening  Typical number of drinks in a day: 3  Typical number of drinks in a week: 21  Interpretation: Low risk drinking behavior. Single Item Drug Screening:  How often have you used an illegal drug (including marijuana) or a prescription medication for non-medical reasons in the past year? never    Single Item Drug Screen Score: 0  Interpretation: Negative screen for possible drug use disorder    Brief Intervention  Healthy alcohol use/limits discussed. Health risks of current substance use discussed. Pt drinks 3 drinks a night to help him sleep. This is a lower amount than he used to drink. He feels that he is not able to sleep without alcohol. He's precontemplative about quitting but I advised him to decrease the amount he drinks and he was open to that suggestion.   I offered naltrexone or campral when he's ready to abstain from alcohol    Other Counseling Topics:   Regular weightbearing exercise. No results found. Physical Exam:     /78   Pulse 73   Resp 16   Ht 6' 5" (1.956 m)   Wt 106 kg (234 lb)   SpO2 97%   BMI 27.75 kg/m²     Physical Exam  Constitutional:       General: He is not in acute distress. Appearance: He is well-developed. He is not toxic-appearing. HENT:      Head: Normocephalic. Nose: No congestion or rhinorrhea. Mouth/Throat:      Mouth: Mucous membranes are moist.      Pharynx: No oropharyngeal exudate or posterior oropharyngeal erythema. Eyes:      Conjunctiva/sclera: Conjunctivae normal.   Cardiovascular:      Rate and Rhythm: Normal rate and regular rhythm. Heart sounds: No murmur heard. Pulmonary:      Effort: No respiratory distress. Breath sounds: No wheezing or rales. Abdominal:      General: Bowel sounds are normal.      Palpations: Abdomen is soft. There is no mass. Tenderness: There is no abdominal tenderness. Musculoskeletal:         General: No tenderness. Cervical back: Neck supple. Skin:     General: Skin is warm and dry. Comments: LE varicose veins are present  See image of the right eyelid lesion: folliculitis vs xanthelema? Neurological:      Mental Status: He is alert and oriented to person, place, and time. Cranial Nerves: No cranial nerve deficit. Motor: No weakness.    Psychiatric:         Mood and Affect: Mood normal.          Sera Suresh MD

## 2023-07-31 NOTE — RESULT ENCOUNTER NOTE
Call patient: Eddie Flores well controlled cholesterol and triglycerides. Apply warm compresses to the skin lesion in the corner of the right eye and use the abx cream I prescribed.

## 2023-07-31 NOTE — PATIENT INSTRUCTIONS
Take claritin 10mg once daily for allegra 180mg once daily. Use the atrovent nasal spray. Please give us a copy of your living will containing your wishes and preferences at the end of life. Medicare Preventive Visit Patient Instructions  Thank you for completing your Welcome to Medicare Visit or Medicare Annual Wellness Visit today. Your next wellness visit will be due in one year (7/31/2024). The screening/preventive services that you may require over the next 5-10 years are detailed below. Some tests may not apply to you based off risk factors and/or age. Screening tests ordered at today's visit but not completed yet may show as past due. Also, please note that scanned in results may not display below. Preventive Screenings:  Service Recommendations Previous Testing/Comments   Colorectal Cancer Screening  Colonoscopy    Fecal Occult Blood Test (FOBT)/Fecal Immunochemical Test (FIT)  Fecal DNA/Cologuard Test  Flexible Sigmoidoscopy Age: 43-73 years old   Colonoscopy: every 10 years (May be performed more frequently if at higher risk)  OR  FOBT/FIT: every 1 year  OR  Cologuard: every 3 years  OR  Sigmoidoscopy: every 5 years  Screening may be recommended earlier than age 39 if at higher risk for colorectal cancer. Also, an individualized decision between you and your healthcare provider will decide whether screening between the ages of 77-80 would be appropriate.  Colonoscopy: 08/28/2015  FOBT/FIT: Not on file  Cologuard: Not on file  Sigmoidoscopy: Not on file    Screening Current     Prostate Cancer Screening Individualized decision between patient and health care provider in men between ages of 53-66   Medicare will cover every 12 months beginning on the day after your 50th birthday PSA: 1.3 ng/mL           Hepatitis C Screening Once for adults born between [address] [place] [address]  More frequently in patients at high risk for Hepatitis C Hep C Antibody: Not on file        Diabetes Screening 1-2 times per year if you're at risk for diabetes or have pre-diabetes Fasting glucose: 89 mg/dL (7/25/2023)  A1C: No results in last 5 years (No results in last 5 years)  Screening Current   Cholesterol Screening Once every 5 years if you don't have a lipid disorder. May order more often based on risk factors. Lipid panel: 09/06/2022  Screening Not Indicated  History Lipid Disorder      Other Preventive Screenings Covered by Medicare:  Abdominal Aortic Aneurysm (AAA) Screening: covered once if your at risk. You're considered to be at risk if you have a family history of AAA or a male between the age of 70-76 who smoking at least 100 cigarettes in your lifetime. Lung Cancer Screening: covers low dose CT scan once per year if you meet all of the following conditions: (1) Age 48-67; (2) No signs or symptoms of lung cancer; (3) Current smoker or have quit smoking within the last 15 years; (4) You have a tobacco smoking history of at least 20 pack years (packs per day x number of years you smoked); (5) You get a written order from a healthcare provider. Glaucoma Screening: covered annually if you're considered high risk: (1) You have diabetes OR (2) Family history of glaucoma OR (3)  aged 48 and older OR (3)  American aged 72 and older  Osteoporosis Screening: covered every 2 years if you meet one of the following conditions: (1) Have a vertebral abnormality; (2) On glucocorticoid therapy for more than 3 months; (3) Have primary hyperparathyroidism; (4) On osteoporosis medications and need to assess response to drug therapy. HIV Screening: covered annually if you're between the age of 14-79. Also covered annually if you are younger than 13 and older than 72 with risk factors for HIV infection. For pregnant patients, it is covered up to 3 times per pregnancy.     Immunizations:  Immunization Recommendations   Influenza Vaccine Annual influenza vaccination during flu season is recommended for all persons aged >= 6 months who do not have contraindications   Pneumococcal Vaccine   * Pneumococcal conjugate vaccine = PCV13 (Prevnar 13), PCV15 (Vaxneuvance), PCV20 (Prevnar 20)  * Pneumococcal polysaccharide vaccine = PPSV23 (Pneumovax) Adults 2364 years old: 1-3 doses may be recommended based on certain risk factors  Adults 72 years old: 1-2 doses may be recommended based off what pneumonia vaccine you previously received   Hepatitis B Vaccine 3 dose series if at intermediate or high risk (ex: diabetes, end stage renal disease, liver disease)   Tetanus (Td) Vaccine - COST NOT COVERED BY MEDICARE PART B Following completion of primary series, a booster dose should be given every 10 years to maintain immunity against tetanus. Td may also be given as tetanus wound prophylaxis. Tdap Vaccine - COST NOT COVERED BY MEDICARE PART B Recommended at least once for all adults. For pregnant patients, recommended with each pregnancy. Shingles Vaccine (Shingrix) - COST NOT COVERED BY MEDICARE PART B  2 shot series recommended in those aged 48 and above     Health Maintenance Due:      Topic Date Due    Hepatitis C Screening  Never done    Colorectal Cancer Screening  08/28/2025     Immunizations Due:      Topic Date Due    Hepatitis A Vaccine (1 of 2 - Risk 2-dose series) Never done    Hepatitis B Vaccine (1 of 3 - Risk 3-dose series) Never done    COVID-19 Vaccine (4 - Pfizer series) 02/11/2023    Influenza Vaccine (1) 09/01/2023     Advance Directives   What are advance directives? Advance directives are legal documents that state your wishes and plans for medical care. These plans are made ahead of time in case you lose your ability to make decisions for yourself. Advance directives can apply to any medical decision, such as the treatments you want, and if you want to donate organs. What are the types of advance directives? There are many types of advance directives, and each state has rules about how to use them.  You may choose a combination of any of the following:  Living will: This is a written record of the treatment you want. You can also choose which treatments you do not want, which to limit, and which to stop at a certain time. This includes surgery, medicine, IV fluid, and tube feedings. Durable power of  for healthcare [facility]): This is a written record that states who you want to make healthcare choices for you when you are unable to make them for yourself. This person, called a proxy, is usually a family member or a friend. You may choose more than 1 proxy. Do not resuscitate (DNR) order:  A DNR order is used in case your heart stops beating or you stop breathing. It is a request not to have certain forms of treatment, such as CPR. A DNR order may be included in other types of advance directives. Medical directive: This covers the care that you want if you are in a coma, near death, or unable to make decisions for yourself. You can list the treatments you want for each condition. Treatment may include pain medicine, surgery, blood transfusions, dialysis, IV or tube feedings, and a ventilator (breathing machine). Values history: This document has questions about your views, beliefs, and how you feel and think about life. This information can help others choose the care that you would choose. Why are advance directives important? An advance directive helps you control your care. Although spoken wishes may be used, it is better to have your wishes written down. Spoken wishes can be misunderstood, or not followed. Treatments may be given even if you do not want them. An advance directive may make it easier for your family to make difficult choices about your care. Weight Management   Why it is important to manage your weight:  Being overweight increases your risk of health conditions such as heart disease, high blood pressure, type 2 diabetes, and certain types of cancer.  It can also increase your risk for osteoarthritis, sleep apnea, and other respiratory problems. Aim for a slow, steady weight loss. Even a small amount of weight loss can lower your risk of health problems. How to lose weight safely:  A safe and healthy way to lose weight is to eat fewer calories and get regular exercise. You can lose up about 1 pound a week by decreasing the number of calories you eat by 500 calories each day. Healthy meal plan for weight management:  A healthy meal plan includes a variety of foods, contains fewer calories, and helps you stay healthy. A healthy meal plan includes the following:  Eat whole-grain foods more often. A healthy meal plan should contain fiber. Fiber is the part of grains, fruits, and vegetables that is not broken down by your body. Whole-grain foods are healthy and provide extra fiber in your diet. Some examples of whole-grain foods are whole-wheat breads and pastas, oatmeal, brown rice, and bulgur. Eat a variety of vegetables every day. Include dark, leafy greens such as spinach, kale, shaggy greens, and mustard greens. Eat yellow and orange vegetables such as carrots, sweet potatoes, and winter squash. Eat a variety of fruits every day. Choose fresh or canned fruit (canned in its own juice or light syrup) instead of juice. Fruit juice has very little or no fiber. Eat low-fat dairy foods. Drink fat-free (skim) milk or 1% milk. Eat fat-free yogurt and low-fat cottage cheese. Try low-fat cheeses such as mozzarella and other reduced-fat cheeses. Choose meat and other protein foods that are low in fat. Choose beans or other legumes such as split peas or lentils. Choose fish, skinless poultry (chicken or turkey), or lean cuts of red meat (beef or pork). Before you cook meat or poultry, cut off any visible fat. Use less fat and oil. Try baking foods instead of frying them. Add less fat, such as margarine, sour cream, regular salad dressing and mayonnaise to foods. Eat fewer high-fat foods. Some examples of high-fat foods include french fries, doughnuts, ice cream, and cakes. Eat fewer sweets. Limit foods and drinks that are high in sugar. This includes candy, cookies, regular soda, and sweetened drinks. Exercise:  Exercise at least 30 minutes per day on most days of the week. Some examples of exercise include walking, biking, dancing, and swimming. You can also fit in more physical activity by taking the stairs instead of the elevator or parking farther away from stores. Ask your healthcare provider about the best exercise plan for you. © Copyright [employer] 2018 Information is for End User's use only and may not be sold, redistributed or otherwise used for commercial purposes. All illustrations and images included in CareNotes® are the copyrighted property of [employer] or [facility] Preventive Visit Patient Instructions  Thank you for completing your Welcome to Medicare Visit or Medicare Annual Wellness Visit today. Your next wellness visit will be due in one year (7/31/2024). The screening/preventive services that you may require over the next 5-10 years are detailed below. Some tests may not apply to you based off risk factors and/or age. Screening tests ordered at today's visit but not completed yet may show as past due. Also, please note that scanned in results may not display below. Preventive Screenings:  Service Recommendations Previous Testing/Comments   Colorectal Cancer Screening  Colonoscopy    Fecal Occult Blood Test (FOBT)/Fecal Immunochemical Test (FIT)  Fecal DNA/Cologuard Test  Flexible Sigmoidoscopy Age: 43-73 years old   Colonoscopy: every 10 years (May be performed more frequently if at higher risk)  OR  FOBT/FIT: every 1 year  OR  Cologuard: every 3 years  OR  Sigmoidoscopy: every 5 years  Screening may be recommended earlier than age 39 if at higher risk for colorectal cancer.  Also, an individualized decision between you and your healthcare provider will decide whether screening between the ages of 77-80 would be appropriate. Colonoscopy: 08/28/2015  FOBT/FIT: Not on file  Cologuard: Not on file  Sigmoidoscopy: Not on file    Screening Current     Prostate Cancer Screening Individualized decision between patient and health care provider in men between ages of 53-66   Medicare will cover every 12 months beginning on the day after your 50th birthday PSA: 1.3 ng/mL           Hepatitis C Screening Once for adults born between 1945 and 1965  More frequently in patients at high risk for Hepatitis C Hep C Antibody: Not on file        Diabetes Screening 1-2 times per year if you're at risk for diabetes or have pre-diabetes Fasting glucose: 89 mg/dL (7/25/2023)  A1C: No results in last 5 years (No results in last 5 years)  Screening Current   Cholesterol Screening Once every 5 years if you don't have a lipid disorder. May order more often based on risk factors. Lipid panel: 09/06/2022  Screening Not Indicated  History Lipid Disorder      Other Preventive Screenings Covered by Medicare:  Abdominal Aortic Aneurysm (AAA) Screening: covered once if your at risk. You're considered to be at risk if you have a family history of AAA or a male between the age of 70-76 who smoking at least 100 cigarettes in your lifetime. Lung Cancer Screening: covers low dose CT scan once per year if you meet all of the following conditions: (1) Age 48-67; (2) No signs or symptoms of lung cancer; (3) Current smoker or have quit smoking within the last 15 years; (4) You have a tobacco smoking history of at least 20 pack years (packs per day x number of years you smoked); (5) You get a written order from a healthcare provider.   Glaucoma Screening: covered annually if you're considered high risk: (1) You have diabetes OR (2) Family history of glaucoma OR (3)  aged 48 and older OR (3)  American aged 72 and older  Osteoporosis Screening: covered every 2 years if you meet one of the following conditions: (1) Have a vertebral abnormality; (2) On glucocorticoid therapy for more than 3 months; (3) Have primary hyperparathyroidism; (4) On osteoporosis medications and need to assess response to drug therapy. HIV Screening: covered annually if you're between the age of 14-79. Also covered annually if you are younger than 13 and older than 72 with risk factors for HIV infection. For pregnant patients, it is covered up to 3 times per pregnancy. Immunizations:  Immunization Recommendations   Influenza Vaccine Annual influenza vaccination during flu season is recommended for all persons aged >= 6 months who do not have contraindications   Pneumococcal Vaccine   * Pneumococcal conjugate vaccine = PCV13 (Prevnar 13), PCV15 (Vaxneuvance), PCV20 (Prevnar 20)  * Pneumococcal polysaccharide vaccine = PPSV23 (Pneumovax) Adults 20-63 years old: 1-3 doses may be recommended based on certain risk factors  Adults 72 years old: 1-2 doses may be recommended based off what pneumonia vaccine you previously received   Hepatitis B Vaccine 3 dose series if at intermediate or high risk (ex: diabetes, end stage renal disease, liver disease)   Tetanus (Td) Vaccine - COST NOT COVERED BY MEDICARE PART B Following completion of primary series, a booster dose should be given every 10 years to maintain immunity against tetanus. Td may also be given as tetanus wound prophylaxis. Tdap Vaccine - COST NOT COVERED BY MEDICARE PART B Recommended at least once for all adults. For pregnant patients, recommended with each pregnancy.    Shingles Vaccine (Shingrix) - COST NOT COVERED BY MEDICARE PART B  2 shot series recommended in those aged 48 and above     Health Maintenance Due:      Topic Date Due    Hepatitis C Screening  Never done    Colorectal Cancer Screening  08/28/2025     Immunizations Due:      Topic Date Due    Hepatitis A Vaccine (1 of 2 - Risk 2-dose series) Never done    Hepatitis B Vaccine (1 of 3 - Risk 3-dose series) Never done    COVID-19 Vaccine (4 - Pfizer series) 02/11/2023    Influenza Vaccine (1) 09/01/2023     Advance Directives   What are advance directives? Advance directives are legal documents that state your wishes and plans for medical care. These plans are made ahead of time in case you lose your ability to make decisions for yourself. Advance directives can apply to any medical decision, such as the treatments you want, and if you want to donate organs. What are the types of advance directives? There are many types of advance directives, and each state has rules about how to use them. You may choose a combination of any of the following:  Living will: This is a written record of the treatment you want. You can also choose which treatments you do not want, which to limit, and which to stop at a certain time. This includes surgery, medicine, IV fluid, and tube feedings. Durable power of  for healthcare [facility] This is a written record that states who you want to make healthcare choices for you when you are unable to make them for yourself. This person, called a proxy, is usually a family member or a friend. You may choose more than 1 proxy. Do not resuscitate (DNR) order:  A DNR order is used in case your heart stops beating or you stop breathing. It is a request not to have certain forms of treatment, such as CPR. A DNR order may be included in other types of advance directives. Medical directive: This covers the care that you want if you are in a coma, near death, or unable to make decisions for yourself. You can list the treatments you want for each condition. Treatment may include pain medicine, surgery, blood transfusions, dialysis, IV or tube feedings, and a ventilator (breathing machine). Values history: This document has questions about your views, beliefs, and how you feel and think about life.  This information can help others choose the care that you would choose. Why are advance directives important? An advance directive helps you control your care. Although spoken wishes may be used, it is better to have your wishes written down. Spoken wishes can be misunderstood, or not followed. Treatments may be given even if you do not want them. An advance directive may make it easier for your family to make difficult choices about your care. Weight Management   Why it is important to manage your weight:  Being overweight increases your risk of health conditions such as heart disease, high blood pressure, type 2 diabetes, and certain types of cancer. It can also increase your risk for osteoarthritis, sleep apnea, and other respiratory problems. Aim for a slow, steady weight loss. Even a small amount of weight loss can lower your risk of health problems. How to lose weight safely:  A safe and healthy way to lose weight is to eat fewer calories and get regular exercise. You can lose up about 1 pound a week by decreasing the number of calories you eat by 500 calories each day. Healthy meal plan for weight management:  A healthy meal plan includes a variety of foods, contains fewer calories, and helps you stay healthy. A healthy meal plan includes the following:  Eat whole-grain foods more often. A healthy meal plan should contain fiber. Fiber is the part of grains, fruits, and vegetables that is not broken down by your body. Whole-grain foods are healthy and provide extra fiber in your diet. Some examples of whole-grain foods are whole-wheat breads and pastas, oatmeal, brown rice, and bulgur. Eat a variety of vegetables every day. Include dark, leafy greens such as spinach, kale, shaggy greens, and mustard greens. Eat yellow and orange vegetables such as carrots, sweet potatoes, and winter squash. Eat a variety of fruits every day. Choose fresh or canned fruit (canned in its own juice or light syrup) instead of juice. Fruit juice has very little or no fiber.   Eat low-fat dairy foods. Drink fat-free (skim) milk or 1% milk. Eat fat-free yogurt and low-fat cottage cheese. Try low-fat cheeses such as mozzarella and other reduced-fat cheeses. Choose meat and other protein foods that are low in fat. Choose beans or other legumes such as split peas or lentils. Choose fish, skinless poultry (chicken or turkey), or lean cuts of red meat (beef or pork). Before you cook meat or poultry, cut off any visible fat. Use less fat and oil. Try baking foods instead of frying them. Add less fat, such as margarine, sour cream, regular salad dressing and mayonnaise to foods. Eat fewer high-fat foods. Some examples of high-fat foods include french fries, doughnuts, ice cream, and cakes. Eat fewer sweets. Limit foods and drinks that are high in sugar. This includes candy, cookies, regular soda, and sweetened drinks. Exercise:  Exercise at least 30 minutes per day on most days of the week. Some examples of exercise include walking, biking, dancing, and swimming. You can also fit in more physical activity by taking the stairs instead of the elevator or parking farther away from stores. Ask your healthcare provider about the best exercise plan for you. © Copyright [employer] 2018 Information is for End User's use only and may not be sold, redistributed or otherwise used for commercial purposes.  All illustrations and images included in CareNotes® are the copyrighted property of [employer] Inc. or  [place]

## 2023-07-31 NOTE — ASSESSMENT & PLAN NOTE
Pt presents with cc of postnasal discharge, drip, frequent throat clearing going on for 4 months or so. Denies fever, sinus pain, heartbur, acid reflux. Has a scant cough, no wheezing or dyspnea. Physical exam of the nose and throat was benign today, I saw no signs of infection. Try antihistamin, atroven nasal spray, refer to ENT for evaluation.

## 2023-07-31 NOTE — ASSESSMENT & PLAN NOTE
Lab Results   Component Value Date    EGFR 53 07/25/2023    EGFR 58 03/22/2023    EGFR 43 01/17/2023    CREATININE 1.30 07/25/2023    CREATININE 1.21 03/22/2023    CREATININE 1.54 (H) 01/17/2023       He has stage 3a ckd, renal fxn has been stable  Denies urinary retention or hematuria  Follow closely  Avoid nsaids

## 2023-09-29 DIAGNOSIS — K21.9 GASTROESOPHAGEAL REFLUX DISEASE WITHOUT ESOPHAGITIS: ICD-10-CM

## 2023-09-29 RX ORDER — PANTOPRAZOLE SODIUM 40 MG/1
TABLET, DELAYED RELEASE ORAL
Qty: 90 TABLET | Refills: 1 | Status: SHIPPED | OUTPATIENT
Start: 2023-09-29

## 2023-11-01 DIAGNOSIS — J31.0 CHRONIC RHINITIS: ICD-10-CM

## 2023-11-01 RX ORDER — IPRATROPIUM BROMIDE 42 UG/1
SPRAY, METERED NASAL
Qty: 15 ML | Refills: 5 | Status: SHIPPED | OUTPATIENT
Start: 2023-11-01

## 2023-11-16 DIAGNOSIS — E78.00 HYPERCHOLESTEREMIA: ICD-10-CM

## 2023-11-16 RX ORDER — PRAVASTATIN SODIUM 20 MG
20 TABLET ORAL DAILY
Qty: 90 TABLET | Refills: 1 | Status: SHIPPED | OUTPATIENT
Start: 2023-11-16 | End: 2024-05-14

## 2023-11-20 ENCOUNTER — RA CDI HCC (OUTPATIENT)
Dept: OTHER | Facility: HOSPITAL | Age: 73
End: 2023-11-20

## 2023-11-27 ENCOUNTER — LAB (OUTPATIENT)
Dept: LAB | Facility: HOSPITAL | Age: 73
End: 2023-11-27
Payer: MEDICARE

## 2023-11-27 ENCOUNTER — OFFICE VISIT (OUTPATIENT)
Dept: FAMILY MEDICINE CLINIC | Facility: HOSPITAL | Age: 73
End: 2023-11-27
Payer: MEDICARE

## 2023-11-27 VITALS
SYSTOLIC BLOOD PRESSURE: 132 MMHG | DIASTOLIC BLOOD PRESSURE: 70 MMHG | OXYGEN SATURATION: 97 % | RESPIRATION RATE: 16 BRPM | BODY MASS INDEX: 28.7 KG/M2 | WEIGHT: 242 LBS | HEART RATE: 68 BPM

## 2023-11-27 DIAGNOSIS — I48.0 PAROXYSMAL ATRIAL FIBRILLATION (HCC): Primary | ICD-10-CM

## 2023-11-27 DIAGNOSIS — N18.31 STAGE 3A CHRONIC KIDNEY DISEASE (HCC): ICD-10-CM

## 2023-11-27 DIAGNOSIS — I10 ESSENTIAL HYPERTENSION: ICD-10-CM

## 2023-11-27 LAB
ANION GAP SERPL CALCULATED.3IONS-SCNC: 12 MMOL/L
BUN SERPL-MCNC: 13 MG/DL (ref 5–25)
CALCIUM SERPL-MCNC: 9.3 MG/DL (ref 8.4–10.2)
CHLORIDE SERPL-SCNC: 100 MMOL/L (ref 96–108)
CO2 SERPL-SCNC: 21 MMOL/L (ref 21–32)
CREAT SERPL-MCNC: 1.05 MG/DL (ref 0.6–1.3)
GFR SERPL CREATININE-BSD FRML MDRD: 69 ML/MIN/1.73SQ M
GLUCOSE P FAST SERPL-MCNC: 86 MG/DL (ref 65–99)
POTASSIUM SERPL-SCNC: 3.9 MMOL/L (ref 3.5–5.3)
SODIUM SERPL-SCNC: 133 MMOL/L (ref 135–147)
T4 FREE SERPL-MCNC: 1.09 NG/DL (ref 0.61–1.12)
TSH SERPL DL<=0.05 MIU/L-ACNC: 4.89 UIU/ML (ref 0.45–4.5)

## 2023-11-27 PROCEDURE — 84443 ASSAY THYROID STIM HORMONE: CPT

## 2023-11-27 PROCEDURE — 84439 ASSAY OF FREE THYROXINE: CPT

## 2023-11-27 PROCEDURE — 80048 BASIC METABOLIC PNL TOTAL CA: CPT

## 2023-11-27 PROCEDURE — 36415 COLL VENOUS BLD VENIPUNCTURE: CPT

## 2023-11-27 PROCEDURE — 99213 OFFICE O/P EST LOW 20 MIN: CPT | Performed by: INTERNAL MEDICINE

## 2023-11-27 NOTE — PROGRESS NOTES
Assessment/Plan:    Essential hypertension  Patient presents for hypertension and PAF follow-up. He denies chest pain, shortness of breath, palpitations, signs of GI/ bleeding, dizziness, lightheadedness. Blood pressure is controlled. Lungs are clear to auscultation, heart is regular rhythm, he has no lower EXTR edema today. Continue same dose of furosemide, Toprol    I will check his electrolytes, renal function and thyroid function    Paroxysmal atrial fibrillation ([address])  He has history of PAF. Denies palpitations. Heart rhythm is regular today. Continue Toprol  He follows with cardiology    Stage 3a chronic kidney disease [facility])  Lab Results   Component Value Date    EGFR 53 07/25/2023    EGFR 58 03/22/2023    EGFR 43 01/17/2023    CREATININE 1.30 07/25/2023    CREATININE 1.21 03/22/2023    CREATININE 1.54 (H) 01/17/2023       Patient has stage IIIa CKD. He denies obstructive urinary complaints. I will recheck his renal function     Diagnoses and all orders for this visit:    Paroxysmal atrial fibrillation [facility])    Essential hypertension  -     Basic metabolic panel; Future  -     TSH, 3rd generation with Free T4 reflex; Future    Stage 3a chronic kidney disease (HCC)          Subjective:      Patient ID: Nolan Rivas is a 74 y.o. male. HPI    Patient presents for follow-up of chronic conditions as detailed in the assessment and plan. The following portions of the patient's history were reviewed and updated as appropriate: current medications, past family history, past medical history, past social history, past surgical history and problem list.    Review of Systems      Objective:    /70   Pulse 68   Resp 16   Wt 110 kg (242 lb)   SpO2 97%   BMI 28.70 kg/m²      Physical Exam  Constitutional:       General: He is not in acute distress. Appearance: He is not toxic-appearing. HENT:      Head: Normocephalic.    Eyes:      Conjunctiva/sclera: Conjunctivae normal. Cardiovascular:      Rate and Rhythm: Normal rate and regular rhythm. Pulmonary:      Effort: No respiratory distress. Breath sounds: No wheezing or rales. Abdominal:      General: Bowel sounds are normal. There is no distension. Palpations: Abdomen is soft. Tenderness: There is no abdominal tenderness. Skin:     General: Skin is warm and dry. Comments: Lower extremity varicose veins are present without edema   Neurological:      Mental Status: He is alert.              Lorenzo Umanzor MD

## 2023-11-27 NOTE — ASSESSMENT & PLAN NOTE
He has history of PAF. Denies palpitations. Heart rhythm is regular today.   Continue Toprol  He follows with cardiology

## 2023-11-27 NOTE — ASSESSMENT & PLAN NOTE
Lab Results   Component Value Date    EGFR 53 07/25/2023    EGFR 58 03/22/2023    EGFR 43 01/17/2023    CREATININE 1.30 07/25/2023    CREATININE 1.21 03/22/2023    CREATININE 1.54 (H) 01/17/2023       Patient has stage IIIa CKD. He denies obstructive urinary complaints.   I will recheck his renal function

## 2023-11-27 NOTE — ASSESSMENT & PLAN NOTE
Patient presents for hypertension and PAF follow-up. He denies chest pain, shortness of breath, palpitations, signs of GI/ bleeding, dizziness, lightheadedness. Blood pressure is controlled. Lungs are clear to auscultation, heart is regular rhythm, he has no lower EXTR edema today.   Continue same dose of furosemide, Toprol    I will check his electrolytes, renal function and thyroid function

## 2024-03-22 ENCOUNTER — TELEPHONE (OUTPATIENT)
Dept: ADMINISTRATIVE | Facility: OTHER | Age: 74
End: 2024-03-22

## 2024-03-22 NOTE — TELEPHONE ENCOUNTER
03/22/24 2:48 PM    Patient contacted (spoke with patient) to bring Advance Directive, POLST, or Living Will document to next scheduled pcp visit.    Thank you.  Qiana Hunt  [facility]

## 2024-03-25 ENCOUNTER — LAB (OUTPATIENT)
Dept: LAB | Facility: HOSPITAL | Age: 74
End: 2024-03-25
Payer: MEDICARE

## 2024-03-25 ENCOUNTER — OFFICE VISIT (OUTPATIENT)
Dept: FAMILY MEDICINE CLINIC | Facility: HOSPITAL | Age: 74
End: 2024-03-25
Payer: MEDICARE

## 2024-03-25 VITALS
HEART RATE: 80 BPM | OXYGEN SATURATION: 96 % | WEIGHT: 244 LBS | HEIGHT: 77 IN | TEMPERATURE: 97.8 F | SYSTOLIC BLOOD PRESSURE: 124 MMHG | RESPIRATION RATE: 16 BRPM | BODY MASS INDEX: 28.81 KG/M2 | DIASTOLIC BLOOD PRESSURE: 76 MMHG

## 2024-03-25 DIAGNOSIS — I10 ESSENTIAL HYPERTENSION: ICD-10-CM

## 2024-03-25 DIAGNOSIS — N18.31 STAGE 3A CHRONIC KIDNEY DISEASE (HCC): ICD-10-CM

## 2024-03-25 DIAGNOSIS — I48.0 PAROXYSMAL ATRIAL FIBRILLATION (HCC): Primary | ICD-10-CM

## 2024-03-25 DIAGNOSIS — E87.1 HYPONATREMIA: ICD-10-CM

## 2024-03-25 PROBLEM — F10.20 UNCOMPLICATED ALCOHOL DEPENDENCE (HCC): Status: RESOLVED | Noted: 2023-07-31 | Resolved: 2024-03-25

## 2024-03-25 PROBLEM — S72.001A CLOSED FRACTURE OF RIGHT HIP (HCC): Status: RESOLVED | Noted: 2021-08-28 | Resolved: 2024-03-25

## 2024-03-25 LAB
ALBUMIN SERPL BCP-MCNC: 4.1 G/DL (ref 3.5–5)
ALP SERPL-CCNC: 62 U/L (ref 34–104)
ALT SERPL W P-5'-P-CCNC: 14 U/L (ref 7–52)
ANION GAP SERPL CALCULATED.3IONS-SCNC: 13 MMOL/L (ref 4–13)
AST SERPL W P-5'-P-CCNC: 23 U/L (ref 13–39)
BILIRUB SERPL-MCNC: 0.81 MG/DL (ref 0.2–1)
BUN SERPL-MCNC: 12 MG/DL (ref 5–25)
CALCIUM SERPL-MCNC: 9.2 MG/DL (ref 8.4–10.2)
CHLORIDE SERPL-SCNC: 101 MMOL/L (ref 96–108)
CO2 SERPL-SCNC: 22 MMOL/L (ref 21–32)
CREAT SERPL-MCNC: 1.16 MG/DL (ref 0.6–1.3)
ERYTHROCYTE [DISTWIDTH] IN BLOOD BY AUTOMATED COUNT: 12.5 % (ref 11.6–15.1)
GFR SERPL CREATININE-BSD FRML MDRD: 61 ML/MIN/1.73SQ M
GLUCOSE SERPL-MCNC: 114 MG/DL (ref 65–140)
HCT VFR BLD AUTO: 48.5 % (ref 36.5–49.3)
HGB BLD-MCNC: 16.3 G/DL (ref 12–17)
MCH RBC QN AUTO: 30.5 PG (ref 26.8–34.3)
MCHC RBC AUTO-ENTMCNC: 33.6 G/DL (ref 31.4–37.4)
MCV RBC AUTO: 91 FL (ref 82–98)
PLATELET # BLD AUTO: 250 THOUSANDS/UL (ref 149–390)
PMV BLD AUTO: 11.6 FL (ref 8.9–12.7)
POTASSIUM SERPL-SCNC: 3.6 MMOL/L (ref 3.5–5.3)
PROT SERPL-MCNC: 7.9 G/DL (ref 6.4–8.4)
RBC # BLD AUTO: 5.34 MILLION/UL (ref 3.88–5.62)
SODIUM SERPL-SCNC: 136 MMOL/L (ref 135–147)
TSH SERPL DL<=0.05 MIU/L-ACNC: 4.24 UIU/ML (ref 0.45–4.5)
WBC # BLD AUTO: 5.78 THOUSAND/UL (ref 4.31–10.16)

## 2024-03-25 PROCEDURE — G2211 COMPLEX E/M VISIT ADD ON: HCPCS | Performed by: INTERNAL MEDICINE

## 2024-03-25 PROCEDURE — 84443 ASSAY THYROID STIM HORMONE: CPT

## 2024-03-25 PROCEDURE — 80053 COMPREHEN METABOLIC PANEL: CPT

## 2024-03-25 PROCEDURE — 85027 COMPLETE CBC AUTOMATED: CPT

## 2024-03-25 PROCEDURE — 99214 OFFICE O/P EST MOD 30 MIN: CPT | Performed by: INTERNAL MEDICINE

## 2024-03-25 PROCEDURE — 36415 COLL VENOUS BLD VENIPUNCTURE: CPT

## 2024-03-25 NOTE — PROGRESS NOTES
"Assessment/Plan:    Essential hypertension  Patient is chronic hypertension.  Blood pressure is controlled today.  I will check his electrolytes, renal function and blood counts.  Continue Toprol and Lasix    Paroxysmal atrial fibrillation (HCC)  He has PAF.  Denies palpitations, lightheadedness.  He follows with cardiology  Heart rhythm is regular today.  Continue metoprolol and aspirin    Stage 3a chronic kidney disease (HCC)  Lab Results   Component Value Date    EGFR 69 11/27/2023    EGFR 53 07/25/2023    EGFR 58 03/22/2023    CREATININE 1.05 11/27/2023    CREATININE 1.30 07/25/2023    CREATININE 1.21 03/22/2023     Patient has stage III CKD.  He denies urinary retention.  He feels his flow is good.  I will recheck his sodium and  renal function    Hyponatremia  Patient has chronic hyponatremia.  Sodium was 133 last November.  He is euvolemic today.  I will recheck his sodium     Diagnoses and all orders for this visit:    Paroxysmal atrial fibrillation (HCC)    Essential hypertension  -     Comprehensive metabolic panel; Future  -     CBC; Future  -     TSH, 3rd generation with Free T4 reflex; Future    Stage 3a chronic kidney disease (HCC)  -     Comprehensive metabolic panel; Future    Hyponatremia          Subjective:      Patient ID: Obinna Kowalski is a 73 y.o. male.      HPI    Patient presents for follow-up of chronic conditions as detailed in the assessment and plan.      The following portions of the patient's history were reviewed and updated as appropriate: current medications, past family history, past medical history, past social history, past surgical history and problem list.    Review of Systems      Objective:    /76   Pulse 80   Temp 97.8 °F (36.6 °C) (Tympanic)   Resp 16   Ht 6' 5\" (1.956 m)   Wt 111 kg (244 lb)   SpO2 96%   BMI 28.93 kg/m²      Physical Exam  Constitutional:       General: He is not in acute distress.     Appearance: He is not toxic-appearing.   Eyes:      " Conjunctiva/sclera: Conjunctivae normal.   Cardiovascular:      Rate and Rhythm: Normal rate and regular rhythm.      Heart sounds: No murmur heard.  Pulmonary:      Effort: No respiratory distress.      Breath sounds: No wheezing or rales.   Abdominal:      General: Bowel sounds are normal.      Palpations: Abdomen is soft.      Tenderness: There is no abdominal tenderness.   Skin:     Comments: He has lower extremity varicose veins without ulcers or edema.   Neurological:      Mental Status: He is alert and oriented to person, place, and time.      Cranial Nerves: No cranial nerve deficit.      Motor: No weakness.      Gait: Gait abnormal (He walks with a walker).   Psychiatric:         Mood and Affect: Mood normal.           Depression Screening and Follow-up Plan: Patient was screened for depression during today's encounter. They screened negative with a PHQ-2 score of 0.        Alexi Pimentel MD

## 2024-03-25 NOTE — ASSESSMENT & PLAN NOTE
He has PAF.  Denies palpitations, lightheadedness.  He follows with cardiology  Heart rhythm is regular today.  Continue metoprolol and aspirin

## 2024-03-25 NOTE — ASSESSMENT & PLAN NOTE
Patient is chronic hypertension.  Blood pressure is controlled today.  I will check his electrolytes, renal function and blood counts.  Continue Toprol and Lasix

## 2024-03-25 NOTE — ASSESSMENT & PLAN NOTE
Lab Results   Component Value Date    EGFR 69 11/27/2023    EGFR 53 07/25/2023    EGFR 58 03/22/2023    CREATININE 1.05 11/27/2023    CREATININE 1.30 07/25/2023    CREATININE 1.21 03/22/2023     Patient has stage III CKD.  He denies urinary retention.  He feels his flow is good.  I will recheck his sodium and  renal function

## 2024-03-25 NOTE — ASSESSMENT & PLAN NOTE
Patient has chronic hyponatremia.  Sodium was 133 last November.  He is euvolemic today.  I will recheck his sodium

## 2024-04-04 DIAGNOSIS — K21.9 GASTROESOPHAGEAL REFLUX DISEASE WITHOUT ESOPHAGITIS: ICD-10-CM

## 2024-04-04 RX ORDER — PANTOPRAZOLE SODIUM 40 MG/1
40 TABLET, DELAYED RELEASE ORAL DAILY
Qty: 90 TABLET | Refills: 3 | Status: SHIPPED | OUTPATIENT
Start: 2024-04-04

## 2024-05-15 DIAGNOSIS — E78.00 HYPERCHOLESTEREMIA: ICD-10-CM

## 2024-05-15 RX ORDER — PRAVASTATIN SODIUM 20 MG
20 TABLET ORAL DAILY
Qty: 90 TABLET | Refills: 1 | Status: SHIPPED | OUTPATIENT
Start: 2024-05-15 | End: 2024-11-11

## 2024-07-18 DIAGNOSIS — E53.8 B12 DEFICIENCY: Primary | ICD-10-CM

## 2024-07-18 DIAGNOSIS — J31.0 CHRONIC RHINITIS: ICD-10-CM

## 2024-07-19 RX ORDER — FOLIC ACID 1 MG/1
1 TABLET ORAL DAILY
Qty: 90 TABLET | Refills: 0 | Status: SHIPPED | OUTPATIENT
Start: 2024-07-19

## 2024-07-19 RX ORDER — IPRATROPIUM BROMIDE 42 UG/1
2 SPRAY, METERED NASAL 2 TIMES DAILY
Qty: 15 ML | Refills: 5 | Status: SHIPPED | OUTPATIENT
Start: 2024-07-19

## 2024-09-18 ENCOUNTER — APPOINTMENT (OUTPATIENT)
Dept: LAB | Facility: HOSPITAL | Age: 74
End: 2024-09-18
Payer: MEDICARE

## 2024-09-18 ENCOUNTER — OFFICE VISIT (OUTPATIENT)
Dept: FAMILY MEDICINE CLINIC | Facility: HOSPITAL | Age: 74
End: 2024-09-18
Payer: MEDICARE

## 2024-09-18 VITALS
SYSTOLIC BLOOD PRESSURE: 130 MMHG | DIASTOLIC BLOOD PRESSURE: 84 MMHG | TEMPERATURE: 99 F | OXYGEN SATURATION: 96 % | HEIGHT: 75 IN | BODY MASS INDEX: 31.46 KG/M2 | RESPIRATION RATE: 16 BRPM | WEIGHT: 253 LBS | HEART RATE: 72 BPM

## 2024-09-18 DIAGNOSIS — Z00.00 MEDICARE ANNUAL WELLNESS VISIT, SUBSEQUENT: ICD-10-CM

## 2024-09-18 DIAGNOSIS — I10 ESSENTIAL HYPERTENSION: ICD-10-CM

## 2024-09-18 DIAGNOSIS — J31.0 CHRONIC RHINITIS: Primary | ICD-10-CM

## 2024-09-18 LAB
ANION GAP SERPL CALCULATED.3IONS-SCNC: 10 MMOL/L (ref 4–13)
BUN SERPL-MCNC: 12 MG/DL (ref 5–25)
CALCIUM SERPL-MCNC: 9.3 MG/DL (ref 8.4–10.2)
CHLORIDE SERPL-SCNC: 99 MMOL/L (ref 96–108)
CO2 SERPL-SCNC: 23 MMOL/L (ref 21–32)
CREAT SERPL-MCNC: 1.3 MG/DL (ref 0.6–1.3)
GFR SERPL CREATININE-BSD FRML MDRD: 53 ML/MIN/1.73SQ M
GLUCOSE SERPL-MCNC: 105 MG/DL (ref 65–140)
POTASSIUM SERPL-SCNC: 4.1 MMOL/L (ref 3.5–5.3)
SODIUM SERPL-SCNC: 132 MMOL/L (ref 135–147)

## 2024-09-18 PROCEDURE — G0439 PPPS, SUBSEQ VISIT: HCPCS | Performed by: INTERNAL MEDICINE

## 2024-09-18 PROCEDURE — 80048 BASIC METABOLIC PNL TOTAL CA: CPT

## 2024-09-18 PROCEDURE — 99213 OFFICE O/P EST LOW 20 MIN: CPT | Performed by: INTERNAL MEDICINE

## 2024-09-18 PROCEDURE — 36415 COLL VENOUS BLD VENIPUNCTURE: CPT

## 2024-09-18 RX ORDER — IPRATROPIUM BROMIDE 42 UG/1
2 SPRAY, METERED NASAL 2 TIMES DAILY
Qty: 45 ML | Refills: 1 | Status: SHIPPED | OUTPATIENT
Start: 2024-09-18 | End: 2025-05-24

## 2024-09-18 NOTE — ASSESSMENT & PLAN NOTE
Patient's blood pressure is acceptable.  He had no JVD, was lungs lamination is normal, heart is regular rate and rhythm.    I will check his electrolytes and renal function which were at baseline in March on review of labs.  Continue metoprolol, furosemide    Orders:    Basic metabolic panel; Future

## 2024-09-18 NOTE — PROGRESS NOTES
Ambulatory Visit  Name: Obinna Kowalski      : 1950      MRN: [id number]  Encounter Provider: Alexi Pimentel MD  Encounter Date: 2024   Encounter department: [facility] [address]    Assessment & Plan  Chronic rhinitis  Patient presents with a chief complaint of frequent throat clearing, postnasal dripping, coughing up phlegm.  The symptoms have been intermittent and variable.  They have been going on for months.  He denies fevers, hemoptysis, sinus congestion, sinus pain, shortness of breath, wheezing, acid reflux, heartburn, palpitations.  Ipratropium nasal spray is effective to control the postnasal dripping and throat clearing symptoms but patient has been saving it for the bad days because he only had a refill left.    I saw no evidence of upper or lower respiratory infection today.  He was not in acute congestive heart failure.  His abdomen was soft and nontender.    Patient's chronic rhinitis is the most likely etiology for his symptoms.  Since ipratropium nasal spray has proven effective I gave him a 6-month supply  If anything changes patient will let me know and I will refer patient to ENT    Orders:    ipratropium (ATROVENT) 0.06 % nasal spray; 2 sprays into each nostril 2 (two) times a day    Essential hypertension  Patient's blood pressure is acceptable.  He had no JVD, was lungs lamination is normal, heart is regular rate and rhythm.    I will check his electrolytes and renal function which were at baseline in March on review of labs.  Continue metoprolol, furosemide    Orders:    Basic metabolic panel; Future    Medicare annual wellness visit, subsequent           Depression Screening and Follow-up Plan: Patient was screened for depression during today's encounter. They screened negative with a PHQ-2 score of 0.      Preventive health issues were discussed with patient, and age appropriate screening tests were ordered as noted in patient's After Visit Summary.  Personalized health advice and appropriate referrals for health education or preventive services given if needed, as noted in patient's After Visit Summary.    History of Present Illness     HPI   Patient Care Team:  Alexi Pimentel MD as PCP - General (Internal Medicine)    Review of Systems  Medical History Reviewed by provider this encounter:  Tobacco  Allergies  Meds  Problems  Med Hx  Surg Hx  Fam Hx       Annual Wellness Visit Questionnaire   Obinna is here for his Subsequent Wellness visit.     Health Risk Assessment:   Patient rates overall health as good. Patient feels that their physical health rating is same. Patient is satisfied with their life. Eyesight was rated as same. Hearing was rated as same. Patient feels that their emotional and mental health rating is same. Patients states they are never, rarely angry. Patient states they are never, rarely unusually tired/fatigued. Pain experienced in the last 7 days has been none. Patient states that he has experienced no weight loss or gain in last 6 months.     Depression Screening:   PHQ-2 Score: 0      Fall Risk Screening:   In the past year, patient has experienced: no history of falling in past year      Home Safety:  Patient has trouble with stairs inside or outside of their home. Patient has working smoke alarms and has working carbon monoxide detector. Home safety hazards include: none.     Nutrition:   Current diet is Regular.     Medications:   Patient is currently taking over-the-counter supplements. OTC medications include: see medication list. Patient is able to manage medications.     Activities of Daily Living (ADLs)/Instrumental Activities of Daily Living (IADLs):   Walk and transfer into and out of bed and chair?: Yes  Dress and groom yourself?: Yes    Bathe or shower yourself?: Yes    Feed yourself? Yes  Do your laundry/housekeeping?: No  Manage your money, pay your bills and track your expenses?: Yes  Make your own meals?: No    Do  your own shopping?: No    ADL comments: Wife helps pt.     Previous Hospitalizations:   Any hospitalizations or ED visits within the last 12 months?: No      Advance Care Planning:   Living will: Yes    Advanced directive: Yes    Advanced directive counseling given: Yes      PREVENTIVE SCREENINGS      Cardiovascular Screening:    General: Screening Not Indicated and History Lipid Disorder      Diabetes Screening:     General: Screening Current      Colorectal Cancer Screening:     General: Screening Not Indicated      Prostate Cancer Screening:    General: Screening Not Indicated      Lung Cancer Screening:     General: Screening Not Indicated    Screening, Brief Intervention, and Referral to Treatment (SBIRT)    Screening  Typical number of drinks in a day: 1  Typical number of drinks in a week: 7  Interpretation: Low risk drinking behavior.    Single Item Drug Screening:  How often have you used an illegal drug (including marijuana) or a prescription medication for non-medical reasons in the past year? never    Single Item Drug Screen Score: 0  Interpretation: Negative screen for possible drug use disorder    Brief Intervention  Alcohol & drug use screenings were reviewed. No concerns regarding substance use disorder identified.     Other Counseling Topics:   Regular weightbearing exercise.     Social Determinants of Health     Financial Resource Strain: Low Risk  (7/31/2023)    Overall Financial Resource Strain (CARDIA)     Difficulty of Paying Living Expenses: Not hard at all   Food Insecurity: No Food Insecurity (9/18/2024)    Hunger Vital Sign     Worried About Running Out of Food in the Last Year: Never true     Ran Out of Food in the Last Year: Never true   Transportation Needs: No Transportation Needs (9/18/2024)    PRAPARE - Transportation     Lack of Transportation (Medical): No     Lack of Transportation (Non-Medical): No   Housing Stability: Low Risk  (9/18/2024)    Housing Stability Vital Sign      "Unable to Pay for Housing in the Last Year: No     Number of Times Moved in the Last Year: 1     Homeless in the Last Year: No   Utilities: Not At Risk (9/18/2024)    [facility] Utilities     Threatened with loss of utilities: No     No results found.    Objective     /84 (BP Location: Left arm, Patient Position: Sitting)   Pulse 72   Temp 99 °F (37.2 °C) (Tympanic)   Resp 16   Ht 6' 3\" (1.905 m) Comment: actual  Wt 115 kg (253 lb)   SpO2 96%   BMI 31.62 kg/m²     Physical Exam  Constitutional:       General: He is not in acute distress.     Appearance: He is not toxic-appearing.   HENT:      Head: Normocephalic.      Nose: No congestion or rhinorrhea.      Mouth/Throat:      Mouth: Mucous membranes are moist.      Pharynx: No oropharyngeal exudate or posterior oropharyngeal erythema.   Cardiovascular:      Rate and Rhythm: Normal rate and regular rhythm.      Heart sounds: No murmur heard.     No gallop.   Pulmonary:      Effort: No respiratory distress.      Breath sounds: No wheezing, rhonchi or rales.   Musculoskeletal:      Cervical back: Neck supple.   Skin:     General: Skin is warm and dry.   Neurological:      Mental Status: He is alert and oriented to person, place, and time.      Cranial Nerves: No cranial nerve deficit.      Motor: No weakness.   Psychiatric:         Mood and Affect: Mood normal.         "

## 2024-09-18 NOTE — PATIENT INSTRUCTIONS
Medicare Preventive Visit Patient Instructions  Thank you for completing your Welcome to Medicare Visit or Medicare Annual Wellness Visit today. Your next wellness visit will be due in one year (9/19/2025).  The screening/preventive services that you may require over the next 5-10 years are detailed below. Some tests may not apply to you based off risk factors and/or age. Screening tests ordered at today's visit but not completed yet may show as past due. Also, please note that scanned in results may not display below.  Preventive Screenings:  Service Recommendations Previous Testing/Comments   Colorectal Cancer Screening  Colonoscopy    Fecal Occult Blood Test (FOBT)/Fecal Immunochemical Test (FIT)  Fecal DNA/Cologuard Test  Flexible Sigmoidoscopy Age: 45-75 years old   Colonoscopy: every 10 years (May be performed more frequently if at higher risk)  OR  FOBT/FIT: every 1 year  OR  Cologuard: every 3 years  OR  Sigmoidoscopy: every 5 years  Screening may be recommended earlier than age 45 if at higher risk for colorectal cancer. Also, an individualized decision between you and your healthcare provider will decide whether screening between the ages of 76-85 would be appropriate. Colonoscopy: 08/28/2015  FOBT/FIT: Not on file  Cologuard: Not on file  Sigmoidoscopy: Not on file          Prostate Cancer Screening Individualized decision between patient and health care provider in men between ages of 55-69   Medicare will cover every 12 months beginning on the day after your 50th birthday PSA: 1.3 ng/mL     Screening Not Indicated     Hepatitis C Screening Once for adults born between 1945 and 1965  More frequently in patients at high risk for Hepatitis C Hep C Antibody: Not on file        Diabetes Screening 1-2 times per year if you're at risk for diabetes or have pre-diabetes Fasting glucose: 86 mg/dL (11/27/2023)  A1C: No results in last 5 years (No results in last 5 years)  Screening Current   Cholesterol Screening  Once every 5 years if you don't have a lipid disorder. May order more often based on risk factors. Lipid panel: 07/31/2023  Screening Not Indicated  History Lipid Disorder      Other Preventive Screenings Covered by Medicare:  Abdominal Aortic Aneurysm (AAA) Screening: covered once if your at risk. You're considered to be at risk if you have a family history of AAA or a male between the age of 65-75 who smoking at least 100 cigarettes in your lifetime.  Lung Cancer Screening: covers low dose CT scan once per year if you meet all of the following conditions: (1) Age 55-77; (2) No signs or symptoms of lung cancer; (3) Current smoker or have quit smoking within the last 15 years; (4) You have a tobacco smoking history of at least 20 pack years (packs per day x number of years you smoked); (5) You get a written order from a healthcare provider.  Glaucoma Screening: covered annually if you're considered high risk: (1) You have diabetes OR (2) Family history of glaucoma OR (3)  aged 50 and older OR (4)  American aged 65 and older  Osteoporosis Screening: covered every 2 years if you meet one of the following conditions: (1) Have a vertebral abnormality; (2) On glucocorticoid therapy for more than 3 months; (3) Have primary hyperparathyroidism; (4) On osteoporosis medications and need to assess response to drug therapy.  HIV Screening: covered annually if you're between the age of 15-65. Also covered annually if you are younger than 15 and older than 65 with risk factors for HIV infection. For pregnant patients, it is covered up to 3 times per pregnancy.    Immunizations:  Immunization Recommendations   Influenza Vaccine Annual influenza vaccination during flu season is recommended for all persons aged >= 6 months who do not have contraindications   Pneumococcal Vaccine   * Pneumococcal conjugate vaccine = PCV13 (Prevnar 13), PCV15 (Vaxneuvance), PCV20 (Prevnar 20)  * Pneumococcal polysaccharide  vaccine = PPSV23 (Pneumovax) Adults 19-65 yo with certain risk factors or if 65+ yo  If never received any pneumonia vaccine: recommend Prevnar 20 (PCV20)  Give PCV20 if previously received 1 dose of PCV13 or PPSV23   Hepatitis B Vaccine 3 dose series if at intermediate or high risk (ex: diabetes, end stage renal disease, liver disease)   Respiratory syncytial virus (RSV) Vaccine - COVERED BY MEDICARE PART D  * RSVPreF3 (Arexvy) CDC recommends that adults 60 years of age and older may receive a single dose of RSV vaccine using shared clinical decision-making (SCDM)   Tetanus (Td) Vaccine - COST NOT COVERED BY MEDICARE PART B Following completion of primary series, a booster dose should be given every 10 years to maintain immunity against tetanus. Td may also be given as tetanus wound prophylaxis.   Tdap Vaccine - COST NOT COVERED BY MEDICARE PART B Recommended at least once for all adults. For pregnant patients, recommended with each pregnancy.   Shingles Vaccine (Shingrix) - COST NOT COVERED BY MEDICARE PART B  2 shot series recommended in those 19 years and older who have or will have weakened immune systems or those 50 years and older     Health Maintenance Due:      Topic Date Due   • Hepatitis C Screening  Never done   • Colorectal Cancer Screening  Discontinued     Immunizations Due:  There are no preventive care reminders to display for this patient.  Advance Directives   What are advance directives?  Advance directives are legal documents that state your wishes and plans for medical care. These plans are made ahead of time in case you lose your ability to make decisions for yourself. Advance directives can apply to any medical decision, such as the treatments you want, and if you want to donate organs.   What are the types of advance directives?  There are many types of advance directives, and each state has rules about how to use them. You may choose a combination of any of the following:  Living will:   This is a written record of the treatment you want. You can also choose which treatments you do not want, which to limit, and which to stop at a certain time. This includes surgery, medicine, IV fluid, and tube feedings.   Durable power of  for healthcare (DPAHC):  This is a written record that states who you want to make healthcare choices for you when you are unable to make them for yourself. This person, called a proxy, is usually a family member or a friend. You may choose more than 1 proxy.  Do not resuscitate (DNR) order:  A DNR order is used in case your heart stops beating or you stop breathing. It is a request not to have certain forms of treatment, such as CPR. A DNR order may be included in other types of advance directives.  Medical directive:  This covers the care that you want if you are in a coma, near death, or unable to make decisions for yourself. You can list the treatments you want for each condition. Treatment may include pain medicine, surgery, blood transfusions, dialysis, IV or tube feedings, and a ventilator (breathing machine).  Values history:  This document has questions about your views, beliefs, and how you feel and think about life. This information can help others choose the care that you would choose.  Why are advance directives important?  An advance directive helps you control your care. Although spoken wishes may be used, it is better to have your wishes written down. Spoken wishes can be misunderstood, or not followed. Treatments may be given even if you do not want them. An advance directive may make it easier for your family to make difficult choices about your care.   Weight Management   Why it is important to manage your weight:  Being overweight increases your risk of health conditions such as heart disease, high blood pressure, type 2 diabetes, and certain types of cancer. It can also increase your risk for osteoarthritis, sleep apnea, and other respiratory  problems. Aim for a slow, steady weight loss. Even a small amount of weight loss can lower your risk of health problems.  How to lose weight safely:  A safe and healthy way to lose weight is to eat fewer calories and get regular exercise. You can lose up about 1 pound a week by decreasing the number of calories you eat by 500 calories each day.   Healthy meal plan for weight management:  A healthy meal plan includes a variety of foods, contains fewer calories, and helps you stay healthy. A healthy meal plan includes the following:  Eat whole-grain foods more often.  A healthy meal plan should contain fiber. Fiber is the part of grains, fruits, and vegetables that is not broken down by your body. Whole-grain foods are healthy and provide extra fiber in your diet. Some examples of whole-grain foods are whole-wheat breads and pastas, oatmeal, brown rice, and bulgur.  Eat a variety of vegetables every day.  Include dark, leafy greens such as spinach, kale, shaggy greens, and mustard greens. Eat yellow and orange vegetables such as carrots, sweet potatoes, and winter squash.   Eat a variety of fruits every day.  Choose fresh or canned fruit (canned in its own juice or light syrup) instead of juice. Fruit juice has very little or no fiber.  Eat low-fat dairy foods.  Drink fat-free (skim) milk or 1% milk. Eat fat-free yogurt and low-fat cottage cheese. Try low-fat cheeses such as mozzarella and other reduced-fat cheeses.  Choose meat and other protein foods that are low in fat.  Choose beans or other legumes such as split peas or lentils. Choose fish, skinless poultry (chicken or turkey), or lean cuts of red meat (beef or pork). Before you cook meat or poultry, cut off any visible fat.   Use less fat and oil.  Try baking foods instead of frying them. Add less fat, such as margarine, sour cream, regular salad dressing and mayonnaise to foods. Eat fewer high-fat foods. Some examples of high-fat foods include french fries,  doughnuts, ice cream, and cakes.  Eat fewer sweets.  Limit foods and drinks that are high in sugar. This includes candy, cookies, regular soda, and sweetened drinks.  Exercise:  Exercise at least 30 minutes per day on most days of the week. Some examples of exercise include walking, biking, dancing, and swimming. You can also fit in more physical activity by taking the stairs instead of the elevator or parking farther away from stores. Ask your healthcare provider about the best exercise plan for you.      © Copyright [employer] 2018 Information is for End User's use only and may not be sold, redistributed or otherwise used for commercial purposes. All illustrations and images included in CareNotes® are the copyrighted property of [employer] or [employer]    Medicare Preventive Visit Patient Instructions  Thank you for completing your Welcome to Medicare Visit or Medicare Annual Wellness Visit today. Your next wellness visit will be due in one year (9/19/2025).  The screening/preventive services that you may require over the next 5-10 years are detailed below. Some tests may not apply to you based off risk factors and/or age. Screening tests ordered at today's visit but not completed yet may show as past due. Also, please note that scanned in results may not display below.  Preventive Screenings:  Service Recommendations Previous Testing/Comments   Colorectal Cancer Screening  Colonoscopy    Fecal Occult Blood Test (FOBT)/Fecal Immunochemical Test (FIT)  Fecal DNA/Cologuard Test  Flexible Sigmoidoscopy Age: 45-75 years old   Colonoscopy: every 10 years (May be performed more frequently if at higher risk)  OR  FOBT/FIT: every 1 year  OR  Cologuard: every 3 years  OR  Sigmoidoscopy: every 5 years  Screening may be recommended earlier than age 45 if at higher risk for colorectal cancer. Also, an individualized decision between you and your healthcare provider will decide whether screening between the  ages of 76-85 would be appropriate. Colonoscopy: 08/28/2015  FOBT/FIT: Not on file  Cologuard: Not on file  Sigmoidoscopy: Not on file          Prostate Cancer Screening Individualized decision between patient and health care provider in men between ages of 55-69   Medicare will cover every 12 months beginning on the day after your 50th birthday PSA: 1.3 ng/mL     Screening Not Indicated     Hepatitis C Screening Once for adults born between 1945 and 1965  More frequently in patients at high risk for Hepatitis C Hep C Antibody: Not on file        Diabetes Screening 1-2 times per year if you're at risk for diabetes or have pre-diabetes Fasting glucose: 86 mg/dL (11/27/2023)  A1C: No results in last 5 years (No results in last 5 years)  Screening Current   Cholesterol Screening Once every 5 years if you don't have a lipid disorder. May order more often based on risk factors. Lipid panel: 07/31/2023  Screening Not Indicated  History Lipid Disorder      Other Preventive Screenings Covered by Medicare:  Abdominal Aortic Aneurysm (AAA) Screening: covered once if your at risk. You're considered to be at risk if you have a family history of AAA or a male between the age of 65-75 who smoking at least 100 cigarettes in your lifetime.  Lung Cancer Screening: covers low dose CT scan once per year if you meet all of the following conditions: (1) Age 55-77; (2) No signs or symptoms of lung cancer; (3) Current smoker or have quit smoking within the last 15 years; (4) You have a tobacco smoking history of at least 20 pack years (packs per day x number of years you smoked); (5) You get a written order from a healthcare provider.  Glaucoma Screening: covered annually if you're considered high risk: (1) You have diabetes OR (2) Family history of glaucoma OR (3)  aged 50 and older OR (4)  American aged 65 and older  Osteoporosis Screening: covered every 2 years if you meet one of the following conditions: (1)  Have a vertebral abnormality; (2) On glucocorticoid therapy for more than 3 months; (3) Have primary hyperparathyroidism; (4) On osteoporosis medications and need to assess response to drug therapy.  HIV Screening: covered annually if you're between the age of 15-65. Also covered annually if you are younger than 15 and older than 65 with risk factors for HIV infection. For pregnant patients, it is covered up to 3 times per pregnancy.    Immunizations:  Immunization Recommendations   Influenza Vaccine Annual influenza vaccination during flu season is recommended for all persons aged >= 6 months who do not have contraindications   Pneumococcal Vaccine   * Pneumococcal conjugate vaccine = PCV13 (Prevnar 13), PCV15 (Vaxneuvance), PCV20 (Prevnar 20)  * Pneumococcal polysaccharide vaccine = PPSV23 (Pneumovax) Adults 19-63 yo with certain risk factors or if 65+ yo  If never received any pneumonia vaccine: recommend Prevnar 20 (PCV20)  Give PCV20 if previously received 1 dose of PCV13 or PPSV23   Hepatitis B Vaccine 3 dose series if at intermediate or high risk (ex: diabetes, end stage renal disease, liver disease)   Respiratory syncytial virus (RSV) Vaccine - COVERED BY MEDICARE PART D  * RSVPreF3 (Arexvy) CDC recommends that adults 60 years of age and older may receive a single dose of RSV vaccine using shared clinical decision-making (SCDM)   Tetanus (Td) Vaccine - COST NOT COVERED BY MEDICARE PART B Following completion of primary series, a booster dose should be given every 10 years to maintain immunity against tetanus. Td may also be given as tetanus wound prophylaxis.   Tdap Vaccine - COST NOT COVERED BY MEDICARE PART B Recommended at least once for all adults. For pregnant patients, recommended with each pregnancy.   Shingles Vaccine (Shingrix) - COST NOT COVERED BY MEDICARE PART B  2 shot series recommended in those 19 years and older who have or will have weakened immune systems or those 50 years and older      Health Maintenance Due:      Topic Date Due   • Hepatitis C Screening  Never done   • Colorectal Cancer Screening  Discontinued     Immunizations Due:  There are no preventive care reminders to display for this patient.  Advance Directives   What are advance directives?  Advance directives are legal documents that state your wishes and plans for medical care. These plans are made ahead of time in case you lose your ability to make decisions for yourself. Advance directives can apply to any medical decision, such as the treatments you want, and if you want to donate organs.   What are the types of advance directives?  There are many types of advance directives, and each state has rules about how to use them. You may choose a combination of any of the following:  Living will:  This is a written record of the treatment you want. You can also choose which treatments you do not want, which to limit, and which to stop at a certain time. This includes surgery, medicine, IV fluid, and tube feedings.   Durable power of  for healthcare (DPAHC):  This is a written record that states who you want to make healthcare choices for you when you are unable to make them for yourself. This person, called a proxy, is usually a family member or a friend. You may choose more than 1 proxy.  Do not resuscitate (DNR) order:  A DNR order is used in case your heart stops beating or you stop breathing. It is a request not to have certain forms of treatment, such as CPR. A DNR order may be included in other types of advance directives.  Medical directive:  This covers the care that you want if you are in a coma, near death, or unable to make decisions for yourself. You can list the treatments you want for each condition. Treatment may include pain medicine, surgery, blood transfusions, dialysis, IV or tube feedings, and a ventilator (breathing machine).  Values history:  This document has questions about your views, beliefs, and how  you feel and think about life. This information can help others choose the care that you would choose.  Why are advance directives important?  An advance directive helps you control your care. Although spoken wishes may be used, it is better to have your wishes written down. Spoken wishes can be misunderstood, or not followed. Treatments may be given even if you do not want them. An advance directive may make it easier for your family to make difficult choices about your care.   Weight Management   Why it is important to manage your weight:  Being overweight increases your risk of health conditions such as heart disease, high blood pressure, type 2 diabetes, and certain types of cancer. It can also increase your risk for osteoarthritis, sleep apnea, and other respiratory problems. Aim for a slow, steady weight loss. Even a small amount of weight loss can lower your risk of health problems.  How to lose weight safely:  A safe and healthy way to lose weight is to eat fewer calories and get regular exercise. You can lose up about 1 pound a week by decreasing the number of calories you eat by 500 calories each day.   Healthy meal plan for weight management:  A healthy meal plan includes a variety of foods, contains fewer calories, and helps you stay healthy. A healthy meal plan includes the following:  Eat whole-grain foods more often.  A healthy meal plan should contain fiber. Fiber is the part of grains, fruits, and vegetables that is not broken down by your body. Whole-grain foods are healthy and provide extra fiber in your diet. Some examples of whole-grain foods are whole-wheat breads and pastas, oatmeal, brown rice, and bulgur.  Eat a variety of vegetables every day.  Include dark, leafy greens such as spinach, kale, shaggy greens, and mustard greens. Eat yellow and orange vegetables such as carrots, sweet potatoes, and winter squash.   Eat a variety of fruits every day.  Choose fresh or canned fruit (canned in  its own juice or light syrup) instead of juice. Fruit juice has very little or no fiber.  Eat low-fat dairy foods.  Drink fat-free (skim) milk or 1% milk. Eat fat-free yogurt and low-fat cottage cheese. Try low-fat cheeses such as mozzarella and other reduced-fat cheeses.  Choose meat and other protein foods that are low in fat.  Choose beans or other legumes such as split peas or lentils. Choose fish, skinless poultry (chicken or turkey), or lean cuts of red meat (beef or pork). Before you cook meat or poultry, cut off any visible fat.   Use less fat and oil.  Try baking foods instead of frying them. Add less fat, such as margarine, sour cream, regular salad dressing and mayonnaise to foods. Eat fewer high-fat foods. Some examples of high-fat foods include french fries, doughnuts, ice cream, and cakes.  Eat fewer sweets.  Limit foods and drinks that are high in sugar. This includes candy, cookies, regular soda, and sweetened drinks.  Exercise:  Exercise at least 30 minutes per day on most days of the week. Some examples of exercise include walking, biking, dancing, and swimming. You can also fit in more physical activity by taking the stairs instead of the elevator or parking farther away from stores. Ask your healthcare provider about the best exercise plan for you.      © Copyright [employer] 2018 Information is for End User's use only and may not be sold, redistributed or otherwise used for commercial purposes. All illustrations and images included in CareNotes® are the copyrighted property of [employer] or [employer]

## 2024-09-18 NOTE — ASSESSMENT & PLAN NOTE
Patient presents with a chief complaint of frequent throat clearing, postnasal dripping, coughing up phlegm.  The symptoms have been intermittent and variable.  They have been going on for months.  He denies fevers, hemoptysis, sinus congestion, sinus pain, shortness of breath, wheezing, acid reflux, heartburn, palpitations.  Ipratropium nasal spray is effective to control the postnasal dripping and throat clearing symptoms but patient has been saving it for the bad days because he only had a refill left.    I saw no evidence of upper or lower respiratory infection today.  He was not in acute congestive heart failure.  His abdomen was soft and nontender.    Patient's chronic rhinitis is the most likely etiology for his symptoms.  Since ipratropium nasal spray has proven effective I gave him a 6-month supply  If anything changes patient will let me know and I will refer patient to ENT    Orders:    ipratropium (ATROVENT) 0.06 % nasal spray; 2 sprays into each nostril 2 (two) times a day

## 2024-09-30 DIAGNOSIS — E53.8 B12 DEFICIENCY: ICD-10-CM

## 2024-10-01 RX ORDER — FOLIC ACID 1 MG/1
1 TABLET ORAL DAILY
Qty: 100 TABLET | Refills: 1 | Status: SHIPPED | OUTPATIENT
Start: 2024-10-01

## 2024-11-17 DIAGNOSIS — E78.00 HYPERCHOLESTEREMIA: ICD-10-CM

## 2024-11-18 RX ORDER — PRAVASTATIN SODIUM 20 MG
20 TABLET ORAL DAILY
Qty: 30 TABLET | Refills: 0 | Status: SHIPPED | OUTPATIENT
Start: 2024-11-18 | End: 2025-05-17

## 2024-12-02 DIAGNOSIS — E78.00 HYPERCHOLESTEREMIA: ICD-10-CM

## 2024-12-04 RX ORDER — PRAVASTATIN SODIUM 20 MG
20 TABLET ORAL DAILY
Qty: 90 TABLET | Refills: 1 | Status: SHIPPED | OUTPATIENT
Start: 2024-12-04 | End: 2025-06-02

## 2025-01-06 DIAGNOSIS — K21.9 GASTROESOPHAGEAL REFLUX DISEASE WITHOUT ESOPHAGITIS: ICD-10-CM

## 2025-01-06 RX ORDER — PANTOPRAZOLE SODIUM 40 MG/1
40 TABLET, DELAYED RELEASE ORAL DAILY
Qty: 90 TABLET | Refills: 3 | Status: SHIPPED | OUTPATIENT
Start: 2025-01-06

## 2025-03-14 ENCOUNTER — RA CDI HCC (OUTPATIENT)
Dept: OTHER | Facility: HOSPITAL | Age: 75
End: 2025-03-14

## 2025-03-18 ENCOUNTER — TELEPHONE (OUTPATIENT)
Dept: ADMINISTRATIVE | Facility: OTHER | Age: 75
End: 2025-03-18

## 2025-03-18 NOTE — TELEPHONE ENCOUNTER
03/18/25 1:46 PM    Patient contacted to bring Advance Directive, POLST, or Living Will document to next scheduled pcp visit.[facility] Department spoke with patient/ caregiver.    Thank you.  Jazlyn Mcclain MA  [facility]

## 2025-03-19 ENCOUNTER — APPOINTMENT (OUTPATIENT)
Dept: LAB | Facility: HOSPITAL | Age: 75
End: 2025-03-19
Payer: MEDICARE

## 2025-03-19 ENCOUNTER — OFFICE VISIT (OUTPATIENT)
Dept: FAMILY MEDICINE CLINIC | Facility: HOSPITAL | Age: 75
End: 2025-03-19
Payer: MEDICARE

## 2025-03-19 VITALS
HEIGHT: 75 IN | OXYGEN SATURATION: 96 % | BODY MASS INDEX: 31.95 KG/M2 | TEMPERATURE: 97.7 F | SYSTOLIC BLOOD PRESSURE: 146 MMHG | HEART RATE: 76 BPM | DIASTOLIC BLOOD PRESSURE: 86 MMHG | RESPIRATION RATE: 16 BRPM | WEIGHT: 257 LBS

## 2025-03-19 DIAGNOSIS — I10 ESSENTIAL HYPERTENSION: ICD-10-CM

## 2025-03-19 DIAGNOSIS — N18.31 STAGE 3A CHRONIC KIDNEY DISEASE (HCC): ICD-10-CM

## 2025-03-19 DIAGNOSIS — I48.0 PAROXYSMAL ATRIAL FIBRILLATION (HCC): ICD-10-CM

## 2025-03-19 DIAGNOSIS — Z99.89 USES WALKER: ICD-10-CM

## 2025-03-19 DIAGNOSIS — J31.0 CHRONIC RHINITIS: Primary | ICD-10-CM

## 2025-03-19 LAB
ALBUMIN SERPL BCG-MCNC: 4.1 G/DL (ref 3.5–5)
ALP SERPL-CCNC: 64 U/L (ref 34–104)
ALT SERPL W P-5'-P-CCNC: 17 U/L (ref 7–52)
ANION GAP SERPL CALCULATED.3IONS-SCNC: 13 MMOL/L (ref 4–13)
AST SERPL W P-5'-P-CCNC: 24 U/L (ref 13–39)
BASOPHILS # BLD AUTO: 0.04 THOUSANDS/ÂΜL (ref 0–0.1)
BASOPHILS NFR BLD AUTO: 1 % (ref 0–1)
BILIRUB SERPL-MCNC: 1.15 MG/DL (ref 0.2–1)
BUN SERPL-MCNC: 13 MG/DL (ref 5–25)
CALCIUM SERPL-MCNC: 9.6 MG/DL (ref 8.4–10.2)
CHLORIDE SERPL-SCNC: 98 MMOL/L (ref 96–108)
CHOLEST SERPL-MCNC: 132 MG/DL (ref ?–200)
CO2 SERPL-SCNC: 20 MMOL/L (ref 21–32)
CREAT SERPL-MCNC: 1.19 MG/DL (ref 0.6–1.3)
EOSINOPHIL # BLD AUTO: 0.07 THOUSAND/ÂΜL (ref 0–0.61)
EOSINOPHIL NFR BLD AUTO: 1 % (ref 0–6)
ERYTHROCYTE [DISTWIDTH] IN BLOOD BY AUTOMATED COUNT: 12.9 % (ref 11.6–15.1)
GFR SERPL CREATININE-BSD FRML MDRD: 58 ML/MIN/1.73SQ M
GLUCOSE P FAST SERPL-MCNC: 96 MG/DL (ref 65–99)
HCT VFR BLD AUTO: 45.4 % (ref 36.5–49.3)
HDLC SERPL-MCNC: 54 MG/DL
HGB BLD-MCNC: 15.7 G/DL (ref 12–17)
IMM GRANULOCYTES # BLD AUTO: 0.03 THOUSAND/UL (ref 0–0.2)
IMM GRANULOCYTES NFR BLD AUTO: 1 % (ref 0–2)
LDLC SERPL CALC-MCNC: 63 MG/DL (ref 0–100)
LYMPHOCYTES # BLD AUTO: 1 THOUSANDS/ÂΜL (ref 0.6–4.47)
LYMPHOCYTES NFR BLD AUTO: 16 % (ref 14–44)
MCH RBC QN AUTO: 31 PG (ref 26.8–34.3)
MCHC RBC AUTO-ENTMCNC: 34.6 G/DL (ref 31.4–37.4)
MCV RBC AUTO: 90 FL (ref 82–98)
MONOCYTES # BLD AUTO: 0.81 THOUSAND/ÂΜL (ref 0.17–1.22)
MONOCYTES NFR BLD AUTO: 13 % (ref 4–12)
NEUTROPHILS # BLD AUTO: 4.28 THOUSANDS/ÂΜL (ref 1.85–7.62)
NEUTS SEG NFR BLD AUTO: 68 % (ref 43–75)
NONHDLC SERPL-MCNC: 78 MG/DL
NRBC BLD AUTO-RTO: 0 /100 WBCS
PLATELET # BLD AUTO: 240 THOUSANDS/UL (ref 149–390)
PMV BLD AUTO: 11.9 FL (ref 8.9–12.7)
POTASSIUM SERPL-SCNC: 4.1 MMOL/L (ref 3.5–5.3)
PROT SERPL-MCNC: 8.2 G/DL (ref 6.4–8.4)
RBC # BLD AUTO: 5.07 MILLION/UL (ref 3.88–5.62)
SODIUM SERPL-SCNC: 131 MMOL/L (ref 135–147)
T4 FREE SERPL-MCNC: 0.96 NG/DL (ref 0.61–1.12)
TRIGL SERPL-MCNC: 75 MG/DL (ref ?–150)
TSH SERPL DL<=0.05 MIU/L-ACNC: 4.65 UIU/ML (ref 0.45–4.5)
WBC # BLD AUTO: 6.23 THOUSAND/UL (ref 4.31–10.16)

## 2025-03-19 PROCEDURE — 85025 COMPLETE CBC W/AUTO DIFF WBC: CPT

## 2025-03-19 PROCEDURE — 84439 ASSAY OF FREE THYROXINE: CPT

## 2025-03-19 PROCEDURE — G2211 COMPLEX E/M VISIT ADD ON: HCPCS | Performed by: INTERNAL MEDICINE

## 2025-03-19 PROCEDURE — 80061 LIPID PANEL: CPT

## 2025-03-19 PROCEDURE — 36415 COLL VENOUS BLD VENIPUNCTURE: CPT

## 2025-03-19 PROCEDURE — 99214 OFFICE O/P EST MOD 30 MIN: CPT | Performed by: INTERNAL MEDICINE

## 2025-03-19 PROCEDURE — 80053 COMPREHEN METABOLIC PANEL: CPT

## 2025-03-19 PROCEDURE — 84443 ASSAY THYROID STIM HORMONE: CPT

## 2025-03-19 RX ORDER — IPRATROPIUM BROMIDE 42 UG/1
2 SPRAY, METERED NASAL 2 TIMES DAILY
Qty: 45 ML | Refills: 5 | Status: SHIPPED | OUTPATIENT
Start: 2025-03-19 | End: 2025-11-22

## 2025-03-19 NOTE — ASSESSMENT & PLAN NOTE
He has history of PAF.  Denies palpitations.  Heart rhythm is regular today.  He will continue metoprolol

## 2025-03-19 NOTE — PROGRESS NOTES
Name: Obinna Kowalski      : 1950      MRN: [id number]  Encounter Provider: Alexi Pimentel MD  Encounter Date: 3/19/2025   Encounter department: [facility] [address]  :  Assessment & Plan  Chronic rhinitis  Patient has chronic rhinitis with nasal congestion, postnasal discharge.  He feels that ipratropium nasal spray helps him.  Unfortunately insurance only covers 1 nasal spray and month and when he uses 2 sprays twice a day the 1 nasal spray is not enough for a month.  He does not use ipratropium every day for that reason.    I found no evidence of bacterial infection in the nose or the mouth today.  He will continue ipratropium nasal spray  Orders:  •  ipratropium (ATROVENT) 0.06 % nasal spray; 2 sprays into each nostril 2 (two) times a day    Paroxysmal atrial fibrillation (HCC)  He has history of PAF.  Denies palpitations.  Heart rhythm is regular today.  He will continue metoprolol       Stage 3a chronic kidney disease (HCC)  Lab Results   Component Value Date    EGFR 53 2024    EGFR 61 2024    EGFR 69 2023    CREATININE 1.30 2024    CREATININE 1.16 2024    CREATININE 1.05 2023     He has stage III CKD.  He feels that he is able to empty his bladder well.  I will recheck his renal function today  Orders:  •  Comprehensive metabolic panel; Future    Essential hypertension  Patient has hypertension.  Blood pressure is not controlled today.  He had borderline low blood pressure in January when he saw his cardiologist.  He also consumes alcohol steadily.  I asked him to try to reduce alcohol consumption..  I will check his electrolytes, renal function, thyroid function  Continue metoprolol  He takes furosemide as needed.  Orders:  •  Comprehensive metabolic panel; Future  •  CBC and differential; Future  •  TSH, 3rd generation with Free T4 reflex; Future  •  Lipid panel; Future    Uses walker  Patient has gait dysfunction and falls.  He is dependent  "on his walker and a cane to prevent falls.  Continue using walker             Depression Screening and Follow-up Plan: Patient was screened for depression during today's encounter. They screened negative with a PHQ-2 score of 0.        History of Present Illness   HPI  Review of Systems    Objective   /86 (BP Location: Left arm, Patient Position: Sitting)   Pulse 76   Temp 97.7 °F (36.5 °C) (Tympanic)   Resp 16   Ht 6' 3\" (1.905 m)   Wt 117 kg (257 lb)   SpO2 96%   BMI 32.12 kg/m²      Physical Exam  Constitutional:       General: He is not in acute distress.     Appearance: He is not toxic-appearing.   HENT:      Nose: No congestion or rhinorrhea.      Mouth/Throat:      Mouth: Mucous membranes are moist.      Pharynx: No oropharyngeal exudate or posterior oropharyngeal erythema.   Cardiovascular:      Rate and Rhythm: Normal rate and regular rhythm.      Heart sounds: No murmur heard.  Pulmonary:      Effort: No respiratory distress.      Breath sounds: No wheezing, rhonchi or rales.   Abdominal:      General: Bowel sounds are normal.      Palpations: Abdomen is soft. There is no mass.      Tenderness: There is no abdominal tenderness.   Skin:     General: Skin is warm and dry.      Comments: He had lower extremity varicose veins without ulcers or lower extremity edema today   Neurological:      Mental Status: He is alert.         "

## 2025-03-19 NOTE — ASSESSMENT & PLAN NOTE
Lab Results   Component Value Date    EGFR 53 09/18/2024    EGFR 61 03/25/2024    EGFR 69 11/27/2023    CREATININE 1.30 09/18/2024    CREATININE 1.16 03/25/2024    CREATININE 1.05 11/27/2023     He has stage III CKD.  He feels that he is able to empty his bladder well.  I will recheck his renal function today  Orders:  •  Comprehensive metabolic panel; Future

## 2025-03-19 NOTE — ASSESSMENT & PLAN NOTE
Patient has chronic rhinitis with nasal congestion, postnasal discharge.  He feels that ipratropium nasal spray helps him.  Unfortunately insurance only covers 1 nasal spray and month and when he uses 2 sprays twice a day the 1 nasal spray is not enough for a month.  He does not use ipratropium every day for that reason.    I found no evidence of bacterial infection in the nose or the mouth today.  He will continue ipratropium nasal spray  Orders:  •  ipratropium (ATROVENT) 0.06 % nasal spray; 2 sprays into each nostril 2 (two) times a day

## 2025-03-19 NOTE — ASSESSMENT & PLAN NOTE
Patient has hypertension.  Blood pressure is not controlled today.  He had borderline low blood pressure in January when he saw his cardiologist.  He also consumes alcohol steadily.  I asked him to try to reduce alcohol consumption..  I will check his electrolytes, renal function, thyroid function  Continue metoprolol  He takes furosemide as needed.  Orders:  •  Comprehensive metabolic panel; Future  •  CBC and differential; Future  •  TSH, 3rd generation with Free T4 reflex; Future  •  Lipid panel; Future

## 2025-03-20 ENCOUNTER — RESULTS FOLLOW-UP (OUTPATIENT)
Dept: FAMILY MEDICINE CLINIC | Facility: HOSPITAL | Age: 75
End: 2025-03-20

## 2025-04-04 DIAGNOSIS — E78.00 HYPERCHOLESTEREMIA: ICD-10-CM

## 2025-04-05 RX ORDER — PRAVASTATIN SODIUM 20 MG
20 TABLET ORAL DAILY
Qty: 90 TABLET | Refills: 1 | Status: SHIPPED | OUTPATIENT
Start: 2025-04-05 | End: 2025-10-02

## 2025-04-09 RX ORDER — ZOLPIDEM TARTRATE 10 MG/1
10 TABLET ORAL
Qty: 30 TABLET | Refills: 0 | OUTPATIENT
Start: 2025-04-09

## 2025-04-09 NOTE — TELEPHONE ENCOUNTER
I do not prescribe zolpidem to patient!  Dr. Chance, patient's cardiologist prescribed this to him

## 2025-07-07 RX ORDER — SODIUM BICARBONATE 325 MG/1
325 TABLET ORAL DAILY
Refills: 0 | OUTPATIENT
Start: 2025-07-07

## (undated) DEVICE — CAST PADDING 4 IN UNSTERILE

## (undated) DEVICE — GAUZE SPONGES,16 PLY: Brand: CURITY

## (undated) DEVICE — COBAN 4 IN STERILE

## (undated) DEVICE — HEAVY DUTY TABLE COVER: Brand: CONVERTORS

## (undated) DEVICE — INTENDED FOR TISSUE SEPARATION, AND OTHER PROCEDURES THAT REQUIRE A SHARP SURGICAL BLADE TO PUNCTURE OR CUT.: Brand: BARD-PARKER SAFETY BLADES SIZE 10, STERILE

## (undated) DEVICE — 2.5MM REAMING ROD WITH BALL TIP/950MM-STERILE

## (undated) DEVICE — DRAPE C-ARM X-RAY

## (undated) DEVICE — 6617 IOBAN II PATIENT ISOLATION DRAPE 5/BX,4BX/CS: Brand: STERI-DRAPE™ IOBAN™ 2

## (undated) DEVICE — 3.2MM GUIDE WIRE 400MM

## (undated) DEVICE — CAST PADDING 6IN UNSTERILE

## (undated) DEVICE — SUT VICRYL 0 CT-1 27 IN J260H

## (undated) DEVICE — SPONGE LAP 18 X 18 IN

## (undated) DEVICE — DRAPE SHEET THREE QUARTER

## (undated) DEVICE — GLOVE SRG BIOGEL 7.5

## (undated) DEVICE — SUT VICRYL 2-0 CT-1 27 IN J259H

## (undated) DEVICE — PROXIMATE PLUS MD MULTI-DIRECTIONAL RELEASE SKIN STAPLERS CONTAINS 35 STAINLESS STEEL STAPLES APPROXIMATE CLOSED DIMENSIONS: 6.9MM X 3.9MM WIDE: Brand: PROXIMATE

## (undated) DEVICE — SKIN MARKER DUAL TIP WITH RULER CAP, FLEXIBLE RULER AND LABELS: Brand: DEVON

## (undated) DEVICE — ARTHROSCOPY FLOOR MAT

## (undated) DEVICE — 4.2MM THREE-FLUTED DRILL BIT QC/330MM/100MM CALIBRATION

## (undated) DEVICE — SUT VICRYL 1 CP-1 27 IN J268H

## (undated) DEVICE — PREP SURGICAL PURPREP 26ML

## (undated) DEVICE — OCCLUSIVE GAUZE STRIP,3% BISMUTH TRIBROMOPHENATE IN PETROLATUM BLEND: Brand: XEROFORM

## (undated) DEVICE — GLOVE SRG BIOGEL ORTHOPEDIC 7.5

## (undated) DEVICE — GLOVE SRG BIOGEL PI ORTHOPEDIC 8

## (undated) DEVICE — 3M™ IOBAN™ 2 ANTIMICROBIAL INCISE DRAPE 6650EZ: Brand: IOBAN™ 2

## (undated) DEVICE — DRAPE C-ARMOUR

## (undated) DEVICE — 3M™ STERI-DRAPE™ U-DRAPE 1015: Brand: STERI-DRAPE™

## (undated) DEVICE — SURGICAL GOWN, XL SMARTSLEEVE: Brand: CONVERTORS

## (undated) DEVICE — 3M™ DURAPORE™ SURGICAL TAPE 1538-1, 1 INCH X 10 YARD (2,5CM X 9,1M), 12 ROLLS/BOX: Brand: 3M™ DURAPORE™

## (undated) DEVICE — BULB SYRINGE,IRRIGATION WITH PROTECTIVE CAP: Brand: DOVER

## (undated) DEVICE — SPONGE LAP 18 X 18 IN STRL RFD